# Patient Record
Sex: MALE | Race: WHITE | NOT HISPANIC OR LATINO | Employment: FULL TIME | URBAN - METROPOLITAN AREA
[De-identification: names, ages, dates, MRNs, and addresses within clinical notes are randomized per-mention and may not be internally consistent; named-entity substitution may affect disease eponyms.]

---

## 2017-05-23 ENCOUNTER — OFFICE VISIT (OUTPATIENT)
Dept: URGENT CARE | Facility: RETAIL CLINIC | Age: 42
End: 2017-05-23
Payer: COMMERCIAL

## 2017-05-23 VITALS
SYSTOLIC BLOOD PRESSURE: 128 MMHG | HEART RATE: 84 BPM | TEMPERATURE: 98 F | OXYGEN SATURATION: 99 % | DIASTOLIC BLOOD PRESSURE: 82 MMHG

## 2017-05-23 DIAGNOSIS — J06.9 VIRAL URI WITH COUGH: Primary | ICD-10-CM

## 2017-05-23 PROCEDURE — 99213 OFFICE O/P EST LOW 20 MIN: CPT | Performed by: PHYSICIAN ASSISTANT

## 2017-05-23 RX ORDER — CODEINE PHOSPHATE AND GUAIFENESIN 10; 100 MG/5ML; MG/5ML
1-2 SOLUTION ORAL EVERY 4 HOURS PRN
Qty: 120 ML | Refills: 0 | Status: SHIPPED | OUTPATIENT
Start: 2017-05-23 | End: 2017-06-28

## 2017-05-23 RX ORDER — BENZONATATE 100 MG/1
CAPSULE ORAL
Qty: 40 CAPSULE | Refills: 0 | Status: SHIPPED | OUTPATIENT
Start: 2017-05-23 | End: 2017-06-28

## 2017-05-23 NOTE — NURSING NOTE
"Chief Complaint   Patient presents with     Cough     x 2 weeks, lack of energy x 8-10 days, no fevers       Initial /82 (BP Location: Left arm)  Pulse 84  Temp 98  F (36.7  C) (Temporal)  SpO2 99% Estimated body mass index is 25.2 kg/(m^2) as calculated from the following:    Height as of 11/11/16: 5' 11.26\" (1.81 m).    Weight as of 11/11/16: 182 lb (82.6 kg).  Medication Reconciliation: complete    "

## 2017-05-23 NOTE — PATIENT INSTRUCTIONS
Robitussin with codeine as needed for cough before sleep, up to every 4-6 hours. DO NOT take before driving a vehicle.  Tessalon Perles- Take 1-2 capsules by mouth 3 times daily as needed for cough. On hold at [facility].  No indication for antibiotics discussed.   Rest! Your body needs more rest to heal.  Drink plenty of fluids (warm fluids like tea or soup are soothing and reduce cough)  Sit in the bathroom with a hot shower running and breathe in the steam.  Honey may soothe your sore throat and help manage your cough- may take straight or in warm water with lemon juice.  Avoid smoke from cigarettes, fireplace, bonfire etc.  Take Tylenol or an NSAID such as ibuprofen or naproxen as needed for pain.  Delsym (dextromethorphan) is a 12 hour over the counter cough medication   Mucinex or Robitussin (guiafenesin) thin mucus and may help it to loosen more quickly  Good handwashing is the best way to prevent spread of germs  Present to emergency room if you develop trouble breathing, swallowing or cough-up blood.  Follow up with your primary care provider if symptoms worsen or fail to improve as expected.

## 2017-05-23 NOTE — NURSING NOTE
"Chief Complaint   Patient presents with     Cough     x 2 weeks, lack of energy x 8-10 days, no fevers       Initial /82 (BP Location: Left arm)  Pulse 84  Temp 98  F (36.7  C) (Temporal) Estimated body mass index is 25.2 kg/(m^2) as calculated from the following:    Height as of 11/11/16: 5' 11.26\" (1.81 m).    Weight as of 11/11/16: 182 lb (82.6 kg).  Medication Reconciliation: complete    "

## 2017-05-23 NOTE — MR AVS SNAPSHOT
After Visit Summary   5/23/2017    Fredy Keys    MRN: [id number]           Patient Information     Date Of Birth          1975        Visit Information        Provider Department      5/23/2017 4:00 PM Carol Macias PA-C [facility]        Today's Diagnoses     Viral URI with cough    -  1      Care Instructions    Robitussin with codeine as needed for cough before sleep, up to every 4-6 hours. DO NOT take before driving a vehicle.  Tessalon Perles- Take 1-2 capsules by mouth 3 times daily as needed for cough. On hold at [facility].  No indication for antibiotics discussed.   Rest! Your body needs more rest to heal.  Drink plenty of fluids (warm fluids like tea or soup are soothing and reduce cough)  Sit in the bathroom with a hot shower running and breathe in the steam.  Honey may soothe your sore throat and help manage your cough- may take straight or in warm water with lemon juice.  Avoid smoke from cigarettes, fireplace, bonfire etc.  Take Tylenol or an NSAID such as ibuprofen or naproxen as needed for pain.  Delsym (dextromethorphan) is a 12 hour over the counter cough medication   Mucinex or Robitussin (guiafenesin) thin mucus and may help it to loosen more quickly  Good handwashing is the best way to prevent spread of germs  Present to emergency room if you develop trouble breathing, swallowing or cough-up blood.  Follow up with your primary care provider if symptoms worsen or fail to improve as expected.        Follow-ups after your visit        Who to contact     You can reach your care team any time of the day by calling [phone].  Notification of test results:  If you have an abnormal lab result, we will notify you by phone as soon as possible.         Additional Information About Your Visit        MyChart Information     [employer] lets you send messages to your doctor, view your test results, renew your prescriptions, schedule appointments and  "more. To sign up, go to [email or web address] . Click on \"Log in\" on the left side of the screen, which will take you to the Welcome page. Then click on \"Sign up Now\" on the right side of the page.     You will be asked to enter the access code listed below, as well as some personal information. Please follow the directions to create your username and password.     Your access code is: [email or web address]  Expires: 2017  4:15 PM     Your access code will  in 90 days. If you need help or a new code, please call your [place] clinic or [phone].        Care EveryWhere ID     This is your Care EveryWhere ID. This could be used by other organizations to access your [place] medical records  [id number]        Your Vitals Were     Pulse Temperature Pulse Oximetry             84 98  F (36.7  C) (Temporal) 99%          Blood Pressure from Last 3 Encounters:   17 128/82   16 120/68   10/26/16 120/78    Weight from Last 3 Encounters:   16 182 lb (82.6 kg)   10/26/16 186 lb (84.4 kg)   07/28/15 177 lb (80.3 kg)              Today, you had the following     No orders found for display         Today's Medication Changes          These changes are accurate as of: 17  4:15 PM.  If you have any questions, ask your nurse or doctor.               Start taking these medicines.        Dose/Directions    benzonatate 100 MG capsule   Commonly known as:  TESSALON   Used for:  Viral URI with cough        Take 1-2 capsules by mouth 3 times daily as needed for cough.   Quantity:  40 capsule   Refills:  0       guaiFENesin-codeine 100-10 MG/5ML Soln solution   Commonly known as:  ROBITUSSIN AC   Used for:  Viral URI with cough        Dose:  1-2 tsp.   Take 5-10 mLs by mouth every 4 hours as needed for cough   Quantity:  120 mL   Refills:  0            Where to get your medicines      [facility] [place], [place] -  [facility] " [id number]     Phone:  [phone]     benzonatate 100 MG capsule         Some of these will need a paper prescription and others can be bought over the counter.  Ask your nurse if you have questions.     Bring a paper prescription for each of these medications     guaiFENesin-codeine 100-10 MG/5ML Soln solution                Primary Care Provider Office Phone # Fax #    Shree Cee [phone]       [facility] [place] [address] [place] [address] [address] AGUSTIN SANTOS [id number]        Thank you!     Thank you for choosing [facility]  for your care. Our goal is always to provide you with excellent care. Hearing back from our patients is one way we can continue to improve our services. Please take a few minutes to complete the written survey that you may receive in the mail after your visit with us. Thank you!             Your Updated Medication List - Protect others around you: Learn how to safely use, store and throw away your medicines at www.disposemymeds.org.          This list is accurate as of: 5/23/17  4:15 PM.  Always use your most recent med list.                   Brand Name Dispense Instructions for use    benzonatate 100 MG capsule    TESSALON    40 capsule    Take 1-2 capsules by mouth 3 times daily as needed for cough.       fluticasone 50 MCG/ACT spray    FLONASE    3 Package    Spray 1-2 sprays into both nostrils daily       guaiFENesin-codeine 100-10 MG/5ML Soln solution    ROBITUSSIN AC    120 mL    Take 5-10 mLs by mouth every 4 hours as needed for cough       olopatadine HCl 0.2 % Soln    PATADAY    1 Bottle    Apply 1 drop to eye daily       triamcinolone 0.1 % cream    KENALOG    15 g    Apply sparingly to affected area three times daily       ZYRTEC-D 5-120 MG per 12 hr tablet   Generic drug:  cetirizine-psuedoePHEDrine     3 MONTHS    ONE TABLET TWICE DAILY

## 2017-05-23 NOTE — PROGRESS NOTES
Chief Complaint   Patient presents with     Cough     x 2 weeks, lack of energy x 8-10 days, no fevers     SUBJECTIVE:  Fredy Keys is a 41 year old male who presents to the clinic today with a chief complaint of cough for 8-10 days.  His cough is described as productive of yellow sputum.  The patient's symptoms are moderate and not changing over the course of time.  Associated symptoms include fatigue.  The patient's symptoms are exacerbated by no particular triggers.  Patient has been using OTC cough suppressants to improve symptoms.  Predisposing factors include: seasonal allergies.    Past Medical History:   Diagnosis Date     ALLERGIC RHINITIS NOS 8/24/2006     Current Outpatient Prescriptions   Medication Sig Dispense Refill     guaiFENesin-codeine (ROBITUSSIN AC) 100-10 MG/5ML SOLN solution Take 5-10 mLs by mouth every 4 hours as needed for cough 120 mL 0     benzonatate (TESSALON) 100 MG capsule Take 1-2 capsules by mouth 3 times daily as needed for cough. 40 capsule 0     olopatadine HCl (PATADAY) 0.2 % SOLN Apply 1 drop to eye daily 1 Bottle 11     fluticasone (FLONASE) 50 MCG/ACT nasal spray Spray 1-2 sprays into both nostrils daily 3 Package 3     ZYRTEC-D 5-120 MG OR TB12 ONE TABLET TWICE DAILY 3 MONTHS 1 YEAR     triamcinolone (KENALOG) 0.1 % cream Apply sparingly to affected area three times daily (Patient not taking: Reported on 5/23/2017) 15 g 4     Social History   Substance Use Topics     Smoking status: Never Smoker     Smokeless tobacco: Current User     Types: Chew      Comment: no smoking in the home.  1 tin/week in summer, 1 every other month in winter     Alcohol use 0.0 oz/week     0 Standard drinks or equivalent per week      Comment: 4-5  drinks weekly     Allergies   Allergen Reactions     Zithromax [Azithromycin Dihydrate] Hives     Seasonal Allergies      ROS  Review of systems negative except as stated above.    OBJECTIVE:  /82 (BP Location: Left arm)  Pulse 84  Temp 98  F  (36.7  C) (Temporal)  SpO2 99%  GENERAL APPEARANCE: healthy, alert and in no distress  HEENT: PERRL, conjunctiva clear. Bilateral ear canals and TM's normal. Nose with moderately erythematous and edematous turbinates with clear discharge. Posterior pharynx nonerythematous and without tonsillar hypertrophy or exudate.  NECK: supple, nontender, no lymphadenopathy  RESP: lungs clear to auscultation - no rales, rhonchi or wheezes. Breathing is comfortable, not labored and without use of accessory muscles.  CV: regular rates and rhythm, normal S1 S2, no murmur noted    ASSESSMENT:    ICD-10-CM    1. Viral URI with cough J06.9 guaiFENesin-codeine (ROBITUSSIN AC) 100-10 MG/5ML SOLN solution    B97.89 benzonatate (TESSALON) 100 MG capsule     PLAN:   Patient Instructions   Robitussin with codeine as needed for cough before sleep, up to every 4-6 hours. DO NOT take before driving a vehicle.  Tessalon Perles- Take 1-2 capsules by mouth 3 times daily as needed for cough. On hold at [facility].  No indication for antibiotics discussed.   Rest! Your body needs more rest to heal.  Drink plenty of fluids (warm fluids like tea or soup are soothing and reduce cough)  Sit in the bathroom with a hot shower running and breathe in the steam.  Honey may soothe your sore throat and help manage your cough- may take straight or in warm water with lemon juice.  Avoid smoke from cigarettes, fireplace, bonfire etc.  Take Tylenol or an NSAID such as ibuprofen or naproxen as needed for pain.  Delsym (dextromethorphan) is a 12 hour over the counter cough medication   Mucinex or Robitussin (guiafenesin) thin mucus and may help it to loosen more quickly  Good handwashing is the best way to prevent spread of germs  Present to emergency room if you develop trouble breathing, swallowing or cough-up blood.  Follow up with your primary care provider if symptoms worsen or fail to improve as expected.    Follow up with primary care provider with  any problems, questions or concerns or if symptoms worsen or fail to improve. Patient agreed to plan and verbalized understanding.    Lottie Macias PA-C  [facility] [place]

## 2017-06-28 ENCOUNTER — OFFICE VISIT (OUTPATIENT)
Dept: FAMILY MEDICINE | Facility: CLINIC | Age: 42
End: 2017-06-28
Payer: COMMERCIAL

## 2017-06-28 VITALS
HEART RATE: 80 BPM | TEMPERATURE: 98.4 F | BODY MASS INDEX: 25.48 KG/M2 | WEIGHT: 184 LBS | DIASTOLIC BLOOD PRESSURE: 82 MMHG | OXYGEN SATURATION: 97 % | SYSTOLIC BLOOD PRESSURE: 124 MMHG | RESPIRATION RATE: 12 BRPM

## 2017-06-28 PROCEDURE — 99214 OFFICE O/P EST MOD 30 MIN: CPT | Performed by: FAMILY MEDICINE

## 2017-06-28 RX ORDER — DIAZEPAM 10 MG
10 TABLET ORAL EVERY 6 HOURS PRN
Qty: 2 TABLET | Refills: 0 | Status: SHIPPED | OUTPATIENT
Start: 2017-06-28 | End: 2018-05-11

## 2017-06-28 RX ORDER — IBUPROFEN 800 MG/1
800 TABLET, FILM COATED ORAL EVERY 8 HOURS PRN
Qty: 90 TABLET | Refills: 1 | Status: SHIPPED | OUTPATIENT
Start: 2017-06-28 | End: 2020-09-18

## 2017-06-28 ASSESSMENT — PAIN SCALES - GENERAL: PAINLEVEL: NO PAIN (0)

## 2017-06-28 NOTE — NURSING NOTE
"Chief Complaint   Patient presents with     Consult     Vasectomy       Initial /82  Pulse 80  Temp 98.4  F (36.9  C) (Tympanic)  Resp 12  Wt 184 lb (83.5 kg)  SpO2 97%  BMI 25.48 kg/m2 Estimated body mass index is 25.48 kg/(m^2) as calculated from the following:    Height as of 11/11/16: 5' 11.26\" (1.81 m).    Weight as of this encounter: 184 lb (83.5 kg).  Medication Reconciliation: complete    "

## 2017-06-28 NOTE — MR AVS SNAPSHOT
"              After Visit Summary   6/28/2017    Fredy Keys    MRN: [id number]           Patient Information     Date Of Birth          1975        Visit Information        Provider Department      6/28/2017 2:00 PM Gabe Heredia MD [facility]        Today's Diagnoses     Consult for Vasectomy    -  1       Follow-ups after your visit        Your next 10 appointments already scheduled     Jun 29, 2017  1:20 PM CDT   Vasectomy with Gabe Heredia MD, NL PROC ROOM ONE [facility] ([facility])    [address] [place] [id number] [phone]              Who to contact     If you have questions or need follow up information about today's clinic visit or your schedule please contact [facility] directly at [phone].  Normal or non-critical lab and imaging results will be communicated to you by MyChart, letter or phone within 4 business days after the clinic has received the results. If you do not hear from us within 7 days, please contact the clinic through MyChart or phone. If you have a critical or abnormal lab result, we will notify you by phone as soon as possible.  Submit refill requests through [employer] or call your pharmacy and they will forward the refill request to us. Please allow 3 business days for your refill to be completed.          Additional Information About Your Visit        MyChart Information     [employer]EME lets you send messages to your doctor, view your test results, renew your prescriptions, schedule appointments and more. To sign up, go to [email or web address] . Click on \"Log in\" on the left side of the screen, which will take you to the Welcome page. Then click on \"Sign up Now\" on the right side of the page.     You will be asked to enter the access code listed below, as well as some personal information. Please follow the directions to create your username and password.     Your access " code is: [email or web address]  Expires: 2017  4:15 PM     Your access code will  in 90 days. If you need help or a new code, please call your [place] clinic or [phone].        Care EveryWhere ID     This is your Care EveryWhere ID. This could be used by other organizations to access your [place] medical records  [id number]        Your Vitals Were     Pulse Temperature Respirations Pulse Oximetry BMI (Body Mass Index)       80 98.4  F (36.9  C) (Tympanic) 12 97% 25.48 kg/m2        Blood Pressure from Last 3 Encounters:   17 124/82   17 128/82   16 120/68    Weight from Last 3 Encounters:   17 184 lb (83.5 kg)   16 182 lb (82.6 kg)   10/26/16 186 lb (84.4 kg)              Today, you had the following     No orders found for display         Today's Medication Changes          These changes are accurate as of: 17  4:38 PM.  If you have any questions, ask your nurse or doctor.               Start taking these medicines.        Dose/Directions    diazepam 10 MG tablet   Commonly known as:  VALIUM   Used for:  Reason for consultation   Started by:  Gabe Heredia MD        Dose:  10 mg   Take 1 tablet (10 mg) by mouth every 6 hours as needed for anxiety or sleep Take 30-60 minutes before procedure.  Do not operate a vehicle after taking this medication.   Quantity:  2 tablet   Refills:  0       ibuprofen 800 MG tablet   Commonly known as:  ADVIL/MOTRIN   Used for:  Reason for consultation   Started by:  Gabe Heredia MD        Dose:  800 mg   Take 1 tablet (800 mg) by mouth every 8 hours as needed for moderate pain   Quantity:  90 tablet   Refills:  1            Where to get your medicines      These medications were sent to [facility] [place] [address] [address]     Phone:  [phone]     ibuprofen 800 MG tablet         Some of these will need a paper prescription and others can be bought over the  counter.  Ask your nurse if you have questions.     Bring a paper prescription for each of these medications     diazepam 10 MG tablet                Primary Care Provider Office Phone # Fax #    Shree Cee [phone]       [facility] [place] [address] [place] [address]        Equal Access to Services     KAYLI CLEMONS : Hadii aad ku hadasho Soomaali, waaxda luqadaha, qaybta kaalmada adeegyada, waxay idiin hayaan adeeg khgenevievesh la'bin ah. So [facility] [phone].    ATENCIÓN: Si habla español, tiene a arreaga disposición servicios gratuitos de asistencia lingüística. [facility] [phone].    We comply with applicable federal civil rights laws and [place] laws. We do not discriminate on the basis of race, color, national origin, age, disability sex, sexual orientation or gender identity.            Thank you!     Thank you for choosing [facility]  for your care. Our goal is always to provide you with excellent care. Hearing back from our patients is one way we can continue to improve our services. Please take a few minutes to complete the written survey that you may receive in the mail after your visit with us. Thank you!             Your Updated Medication List - Protect others around you: Learn how to safely use, store and throw away your medicines at www.disposemymeds.org.          This list is accurate as of: 6/28/17  4:38 PM.  Always use your most recent med list.                   Brand Name Dispense Instructions for use Diagnosis    diazepam 10 MG tablet    VALIUM    2 tablet    Take 1 tablet (10 mg) by mouth every 6 hours as needed for anxiety or sleep Take 30-60 minutes before procedure.  Do not operate a vehicle after taking this medication.    Reason for consultation       fluticasone 50 MCG/ACT spray    FLONASE    3 Package    Spray 1-2 sprays into both nostrils daily    Allergic rhinitis, cause unspecified       ibuprofen 800 MG tablet    ADVIL/MOTRIN     90 tablet    Take 1 tablet (800 mg) by mouth every 8 hours as needed for moderate pain    Reason for consultation       olopatadine HCl 0.2 % Soln    PATADAY    1 Bottle    Apply 1 drop to eye daily    Other chronic allergic conjunctivitis       ZYRTEC-D 5-120 MG per 12 hr tablet   Generic drug:  cetirizine-psuedoePHEDrine     3 MONTHS    ONE TABLET TWICE DAILY    Allergic rhinitis, cause unspecified

## 2017-06-28 NOTE — PROGRESS NOTES
SUBJECTIVE:  This 41 year old male is in for a vasectomy consultation.  He and his partner have decided they don't want to have any more children. They have 0 male and 3 female children; ages 9,12, and 14.  They have decided that he will have the sterilization procedure instead of her.  Patient was given information to read prior to the consultation.      We talked about the risks and benefits of the vasectomy; risks being that of potential for bleeding, infection, postoperative discomfort immediately which can last for a number of weeks afterwards, also the development of spermatoceles or sperm granulomas, epididymal cysts and the possibility of failure of the procedure producing failed attempt at sterilization. Number quoted was 1 out of 1000 risk of failure.      Also mentioned to the patient that there is some literature out there that suggests men who have vasectomies are at increased risk for prostate cancer; however, this literature is inconclusive and controversial.  It is recommended that men who have vasectomies should have annual prostate exams through the rectum yearly after age 40 and also may benefit from a screening prostatic specific antigen test after age 40.  We also discussed signs and symptoms of prostatic enlargement or prostatic problems.     I went through the procedure with the patient, describing the no scapel technique.  Using special instruments a midline incision/rent in the skin of the scrotum allowing each vasdeferens to be brought up through this incision dissecting it free from adventitial tissue, transecting it and then cauterizing into the lumen of each end. The proximal end is then buried away from the other so they are not in the same tissue plane. The patient and his partner had no questions when it came to the procedure itself.  I did show him pictures and diagrams of the procedure.  We also discussed the possibility of a spermatocele or sperm granuloma or epididymitis.       Again, the patient had no further questions for me.    OBJECTIVE:  On exam, this is a well-developed, well-nourished white male.    /82  Pulse 80  Temp 98.4  F (36.9  C) (Tympanic)  Resp 12  Wt 184 lb (83.5 kg)  SpO2 97%  BMI 25.48 kg/m2    Exam reveals a normal circumcised penis, no lesions.  Testes are descended bilaterally.  Exam was limited to the genitalia.  Both vas deferens were easily identified.  No other abnormalities were noted.      ASSESSMENT:  Undesired fertility in an adult male, requesting permanent sterilization.    PLAN:  He will schedule a vasectomy at his convenience for either the last appointment of the morning or afternoon on a Thursday or Friday.  He is aware that he will need to take the entire weekend off and have essentially bedrest for two days with ice packs 20-30 minutes out of every hour and ibuprofen for discomfort.  I gave him a prescription for ibuprofen 800 mg. to take every six to eight hours with food for two weeks.  He will take one tablet half an hour before the procedure along with Valium 10 mg and repeat that if he needs a second dose once he gets to the clinic.  If he has any further questions, he will contact me prior to the procedure if he has a waiting period or we will review them prior to the start of the procedure today.  He also is aware that he will need to bring a specimen after a minimum of 10 wks to make sure that he is sterile.     25 minutes were spent with this patient during this consultation with over 50% spent in counseling regarding his vasectomy.     Electronically signed by:  Gabe Heredia M.D.  6/28/2017

## 2017-06-29 ENCOUNTER — OFFICE VISIT (OUTPATIENT)
Dept: FAMILY MEDICINE | Facility: CLINIC | Age: 42
End: 2017-06-29
Payer: COMMERCIAL

## 2017-06-29 VITALS
HEART RATE: 82 BPM | OXYGEN SATURATION: 98 % | SYSTOLIC BLOOD PRESSURE: 124 MMHG | WEIGHT: 182 LBS | TEMPERATURE: 97 F | BODY MASS INDEX: 25.2 KG/M2 | RESPIRATION RATE: 18 BRPM | DIASTOLIC BLOOD PRESSURE: 88 MMHG

## 2017-06-29 DIAGNOSIS — Z30.2 ENCOUNTER FOR STERILIZATION: Primary | ICD-10-CM

## 2017-06-29 PROCEDURE — 55250 REMOVAL OF SPERM DUCT(S): CPT | Performed by: FAMILY MEDICINE

## 2017-06-29 ASSESSMENT — PAIN SCALES - GENERAL: PAINLEVEL: NO PAIN (0)

## 2017-06-29 NOTE — PROGRESS NOTES
The patient comes in today for a vasectomy. The patient had previously been in and we discussed the other options for birth control, the risks and benefits of the procedure. Details of those risks and benefits have been noted in the consent form which has been signed. This includes the potential for bleeding, infection, bruising, potential for sperm granuloma/epididymitis, and failure. Data presented suggests, but has not been supported by further research for an increased risk of prostate carcinoma.     The patient was placed in a supine position on the procedure table. Shave prep was done by the patient at home.   He was then sterilely prepped and draped in the usual fashion. The left vas was first identified and brought up to the midline raphae where a small wheal of Lidocaine with epinephrine was placed in the skin overlying the vas and further anesthesia was injected in to the vas sheath. A small 0.5 cm incision was made with a #15 blade in the skin of the scrotum. The no-scalpel dissecting instrument was then used to dissect the vas deferense free of adventitial tissue. The no-scalpel vas clamp was then used to grasp the vas. When a segment of vas was clearly freed from the adventitia and vascular bundle, it was transected with a scissors and each end was cauterized approximately 1cm down into the vasdeferens itself to seal it. No section of the vas was removed. The distal end was buried within the vas sheath with a purse string suture of 4-0 chromic. No bleeding was noted at the completion of this step and at this time the vasdeferens was returned to the scrotal sac.     Attention was then turned to the opposite side and proceeded in similar fashion. The vas was brought up through the same opening. The scrotal skin incision was not closed at completion of the procedure.     ASSESSMENT:   1) Male sterilization via vasectomy.     PLAN:   Patient tolerated the procedure quite well. He will use Ibuprofen 800 mg.  p.o. t.i.d. with food x 5-7 days. Ice to the scrotum 30 minutes out of every hour for the next 48 hrs. No heavy lifting for three days. The patient will bring in a sample of semen for analysis after a minimum of 12 wks, and will contact me if any problems should arise.    Electronically signed by:  Gabe Heredia M.D.  6/29/2017

## 2017-06-29 NOTE — MR AVS SNAPSHOT
"              After Visit Summary   6/29/2017    Fredy Keys    MRN: [id number]           Patient Information     Date Of Birth          1975        Visit Information        Provider Department      6/29/2017 1:20 PM Gabe Heredia MD; [place] PROC ROOM ONE [facility]        Today's Diagnoses     Encounter for sterilization    -  1       Follow-ups after your visit        Future tests that were ordered for you today     Open Future Orders        Priority Expected Expires Ordered    Semen Post Vasectomy Qual ([place], [place], [place]) Routine  9/29/2017 6/29/2017            Who to contact     If you have questions or need follow up information about today's clinic visit or your schedule please contact [facility] directly at [phone].  Normal or non-critical lab and imaging results will be communicated to you by MyChart, letter or phone within 4 business days after the clinic has received the results. If you do not hear from us within 7 days, please contact the clinic through [employer] or phone. If you have a critical or abnormal lab result, we will notify you by phone as soon as possible.  Submit refill requests through [employer] or call your pharmacy and they will forward the refill request to us. Please allow 3 business days for your refill to be completed.          Additional Information About Your Visit        MyChart Information     [employer] lets you send messages to your doctor, view your test results, renew your prescriptions, schedule appointments and more. To sign up, go to [email or web address] . Click on \"Log in\" on the left side of the screen, which will take you to the Welcome page. Then click on \"Sign up Now\" on the right side of the page.     You will be asked to enter the access code listed below, as well as some personal information. Please follow the directions to create your username and password.     Your access code is: [email or web address]  Expires: 8/21/2017  4:15 " PM     Your access code will  in 90 days. If you need help or a new code, please call your [facility] clinic or [phone].        Care EveryWhere ID     This is your Care EveryWhere ID. This could be used by other organizations to access your [facility] medical records  [id number]        Your Vitals Were     Pulse Temperature Respirations Pulse Oximetry BMI (Body Mass Index)       82 97  F (36.1  C) (Tympanic) 18 98% 25.2 kg/m2        Blood Pressure from Last 3 Encounters:   17 124/88   17 124/82   17 128/82    Weight from Last 3 Encounters:   17 182 lb (82.6 kg)   17 184 lb (83.5 kg)   16 182 lb (82.6 kg)              We Performed the Following     REMOVAL OF SPERM DUCT(S) [03158]        Primary Care Provider Office Phone # Fax #    Shree Antoine Cee [phone]       [address] [place] [address]        Equal Access to Services     [facility]: Hadii aad ku hadasho Soomaali, waaxda luqadaha, qaybta kaalmada adeegyada, alicia ortiz . So [facility] [phone].    ATENCIÓN: Si habla español, tiene a arreaga disposición servicios gratuitos de asistencia lingüística. [facility] [phone].    We comply with applicable federal civil rights laws and [place] laws. We do not discriminate on the basis of race, color, national origin, age, disability sex, sexual orientation or gender identity.            Thank you!     Thank you for choosing [facility]  for your care. Our goal is always to provide you with excellent care. Hearing back from our patients is one way we can continue to improve our services. Please take a few minutes to complete the written survey that you may receive in the mail after your visit with us. Thank you!             Your Updated Medication List - Protect others around you: Learn how to safely use, store and throw away your medicines at www.disposemymeds.org.           This list is accurate as of: 6/29/17 11:59 PM.  Always use your most recent med list.                   Brand Name Dispense Instructions for use Diagnosis    diazepam 10 MG tablet    VALIUM    2 tablet    Take 1 tablet (10 mg) by mouth every 6 hours as needed for anxiety or sleep Take 30-60 minutes before procedure.  Do not operate a vehicle after taking this medication.    Reason for consultation       fluticasone 50 MCG/ACT spray    FLONASE    3 Package    Spray 1-2 sprays into both nostrils daily    Allergic rhinitis, cause unspecified       ibuprofen 800 MG tablet    ADVIL/MOTRIN    90 tablet    Take 1 tablet (800 mg) by mouth every 8 hours as needed for moderate pain    Reason for consultation       olopatadine HCl 0.2 % Soln    PATADAY    1 Bottle    Apply 1 drop to eye daily    Other chronic allergic conjunctivitis       ZYRTEC-D 5-120 MG per 12 hr tablet   Generic drug:  cetirizine-psuedoePHEDrine     3 MONTHS    ONE TABLET TWICE DAILY    Allergic rhinitis, cause unspecified

## 2017-06-29 NOTE — NURSING NOTE
"Chief Complaint   Patient presents with     Vasectomy       Initial /88  Pulse 82  Temp 97  F (36.1  C) (Tympanic)  Resp 18  Wt 182 lb (82.6 kg)  SpO2 98%  BMI 25.2 kg/m2 Estimated body mass index is 25.2 kg/(m^2) as calculated from the following:    Height as of 11/11/16: 5' 11.26\" (1.81 m).    Weight as of this encounter: 182 lb (82.6 kg).  BP completed using cuff size: christa Browne MA      "

## 2017-10-18 ENCOUNTER — TELEPHONE (OUTPATIENT)
Dept: FAMILY MEDICINE | Facility: CLINIC | Age: 42
End: 2017-10-18

## 2017-10-18 NOTE — TELEPHONE ENCOUNTER
Remind patient that it is time for him to bring his post vasectomy sample in to the lab.                Electronically signed by:       Gabe Heredia M.D.       10/12/2017      Pt was notified  MP/MA

## 2017-10-19 DIAGNOSIS — Z30.2 ENCOUNTER FOR STERILIZATION: ICD-10-CM

## 2017-10-19 LAB — SEMEN ANALYSIS P VAS PNL: ABNORMAL

## 2017-10-19 PROCEDURE — 89321 SEMEN ANAL SPERM DETECTION: CPT | Performed by: FAMILY MEDICINE

## 2017-10-19 NOTE — PROGRESS NOTES
Semen analysis showed some sperm so will have him drop off another sample in 3-4 wks.    Electronically signed by:  Gabe Heredia M.D.  10/19/2017

## 2017-12-13 ENCOUNTER — TELEPHONE (OUTPATIENT)
Dept: FAMILY MEDICINE | Facility: CLINIC | Age: 42
End: 2017-12-13

## 2017-12-13 NOTE — TELEPHONE ENCOUNTER
Called pt and left detailed msg to follow RN's advise from previous msg. Should be seen through ER or urgent today to have symptoms addressed.

## 2017-12-13 NOTE — TELEPHONE ENCOUNTER
"Fredy Keys is a 42 year old male who calls with cardiac/BP concern.  Fredy says his heart just feels \"off\". He wouldn't describe it as pain, but just doesn't feel right.  Fredy is asking if he can just have a quick appt in [facility] today to get it checked out quick.  RN advised there were no appt yet today and if really concerned he should go the the ED.   Fredy states that he is not going to the ER.     NURSING ASSESSMENT:  Description:  \"I have an uncomfortable feeling in my my chest.\"  Onset/duration:  A few days . He took his BP at a pharmacy and it indicated that he is pre hypertension.  His reported BP was 139/98 pulse of 73.  Precip. factors:  None. Denies SOB, denies actual PAIN.  \"It does not radiate into my arm or anything.\"  Allergies:   Allergies   Allergen Reactions     Zithromax [Azithromycin Dihydrate] Hives     Seasonal Allergies      NURSING PLAN: Nursing advice to patient Go to ED for evaluation and make an appt with primary for a work up.    RECOMMENDED DISPOSITION:  To ED/ for evaluation, another person to drive - Fredy states he may try the Urgent care in [place].  Will comply with recommendation: Yes  If further questions/concerns or if symptoms do not improve, worsen or new symptoms develop, call your PCP or [facility] as soon as possible.      Guideline used:  Telephone Triage Protocols for Nurses, Fifth Edition, Sosa Estrada RN      "

## 2017-12-13 NOTE — TELEPHONE ENCOUNTER
"Reason for Call:  Same Day Appointment, Requested Provider:  Shree Cee M.D.    PCP: Shree Cee    Reason for visit: work in-\"uncomfotable feeling in chest\"     Duration of symptoms:     Have you been treated for this in the past? No    Additional comments:     Can we leave a detailed message on this number? YES    Phone number patient can be reached at: Cell number on file:    Telephone Information:   Mobile [phone]       Best Time: any    Call taken on 12/13/2017 at 3:37 PM by Trinh Clancy    "

## 2017-12-16 ENCOUNTER — APPOINTMENT (OUTPATIENT)
Dept: GENERAL RADIOLOGY | Facility: CLINIC | Age: 42
End: 2017-12-16
Attending: PHYSICIAN ASSISTANT
Payer: COMMERCIAL

## 2017-12-16 ENCOUNTER — HOSPITAL ENCOUNTER (EMERGENCY)
Facility: CLINIC | Age: 42
Discharge: HOME | End: 2017-12-16
Attending: PHYSICIAN ASSISTANT | Admitting: PHYSICIAN ASSISTANT
Payer: COMMERCIAL

## 2017-12-16 ENCOUNTER — APPOINTMENT (OUTPATIENT)
Dept: CT IMAGING | Facility: CLINIC | Age: 42
End: 2017-12-16
Attending: PHYSICIAN ASSISTANT
Payer: COMMERCIAL

## 2017-12-16 VITALS
BODY MASS INDEX: 25.2 KG/M2 | OXYGEN SATURATION: 98 % | RESPIRATION RATE: 19 BRPM | SYSTOLIC BLOOD PRESSURE: 117 MMHG | HEART RATE: 73 BPM | TEMPERATURE: 97 F | HEIGHT: 71 IN | WEIGHT: 180 LBS | DIASTOLIC BLOOD PRESSURE: 84 MMHG

## 2017-12-16 DIAGNOSIS — R07.89 ATYPICAL CHEST PAIN: ICD-10-CM

## 2017-12-16 DIAGNOSIS — K85.00 IDIOPATHIC ACUTE PANCREATITIS WITHOUT INFECTION OR NECROSIS: ICD-10-CM

## 2017-12-16 LAB
ALBUMIN SERPL-MCNC: 3.7 G/DL (ref 3.4–5)
ALP SERPL-CCNC: 61 U/L (ref 40–150)
ALT SERPL W P-5'-P-CCNC: 27 U/L (ref 0–70)
AMYLASE SERPL-CCNC: 169 U/L (ref 30–110)
ANION GAP SERPL CALCULATED.3IONS-SCNC: 7 MMOL/L (ref 3–14)
AST SERPL W P-5'-P-CCNC: 19 U/L (ref 0–45)
BASOPHILS # BLD AUTO: 0 10E9/L (ref 0–0.2)
BASOPHILS NFR BLD AUTO: 0.4 %
BILIRUB SERPL-MCNC: 0.5 MG/DL (ref 0.2–1.3)
BUN SERPL-MCNC: 27 MG/DL (ref 7–30)
CALCIUM SERPL-MCNC: 8.4 MG/DL (ref 8.5–10.1)
CHLORIDE SERPL-SCNC: 108 MMOL/L (ref 94–109)
CO2 SERPL-SCNC: 28 MMOL/L (ref 20–32)
CREAT SERPL-MCNC: 1.31 MG/DL (ref 0.66–1.25)
D DIMER PPP FEU-MCNC: 0.3 UG/ML FEU (ref 0–0.5)
DIFFERENTIAL METHOD BLD: NORMAL
EOSINOPHIL # BLD AUTO: 0.1 10E9/L (ref 0–0.7)
EOSINOPHIL NFR BLD AUTO: 0.8 %
ERYTHROCYTE [DISTWIDTH] IN BLOOD BY AUTOMATED COUNT: 13.2 % (ref 10–15)
GFR SERPL CREATININE-BSD FRML MDRD: 60 ML/MIN/1.7M2
GLUCOSE SERPL-MCNC: 123 MG/DL (ref 70–99)
HCT VFR BLD AUTO: 47 % (ref 40–53)
HGB BLD-MCNC: 16 G/DL (ref 13.3–17.7)
IMM GRANULOCYTES # BLD: 0 10E9/L (ref 0–0.4)
IMM GRANULOCYTES NFR BLD: 0.3 %
LIPASE SERPL-CCNC: 1713 U/L (ref 73–393)
LYMPHOCYTES # BLD AUTO: 2.5 10E9/L (ref 0.8–5.3)
LYMPHOCYTES NFR BLD AUTO: 23.9 %
MCH RBC QN AUTO: 31.4 PG (ref 26.5–33)
MCHC RBC AUTO-ENTMCNC: 34 G/DL (ref 31.5–36.5)
MCV RBC AUTO: 92 FL (ref 78–100)
MONOCYTES # BLD AUTO: 0.7 10E9/L (ref 0–1.3)
MONOCYTES NFR BLD AUTO: 7 %
NEUTROPHILS # BLD AUTO: 7.1 10E9/L (ref 1.6–8.3)
NEUTROPHILS NFR BLD AUTO: 67.6 %
PLATELET # BLD AUTO: 277 10E9/L (ref 150–450)
POTASSIUM SERPL-SCNC: 4.1 MMOL/L (ref 3.4–5.3)
PROT SERPL-MCNC: 6.6 G/DL (ref 6.8–8.8)
RBC # BLD AUTO: 5.09 10E12/L (ref 4.4–5.9)
SODIUM SERPL-SCNC: 143 MMOL/L (ref 133–144)
TRIGL SERPL-MCNC: 137 MG/DL
TROPONIN I SERPL-MCNC: <0.015 UG/L (ref 0–0.04)
WBC # BLD AUTO: 10.4 10E9/L (ref 4–11)

## 2017-12-16 PROCEDURE — 93010 ELECTROCARDIOGRAM REPORT: CPT | Mod: Z6 | Performed by: PHYSICIAN ASSISTANT

## 2017-12-16 PROCEDURE — 99285 EMERGENCY DEPT VISIT HI MDM: CPT | Mod: 25 | Performed by: PHYSICIAN ASSISTANT

## 2017-12-16 PROCEDURE — 84478 ASSAY OF TRIGLYCERIDES: CPT | Performed by: PHYSICIAN ASSISTANT

## 2017-12-16 PROCEDURE — 85379 FIBRIN DEGRADATION QUANT: CPT | Performed by: PHYSICIAN ASSISTANT

## 2017-12-16 PROCEDURE — 71020 XR CHEST 2 VW: CPT | Mod: TC

## 2017-12-16 PROCEDURE — 82150 ASSAY OF AMYLASE: CPT | Performed by: PHYSICIAN ASSISTANT

## 2017-12-16 PROCEDURE — 84484 ASSAY OF TROPONIN QUANT: CPT | Performed by: PHYSICIAN ASSISTANT

## 2017-12-16 PROCEDURE — 25000132 ZZH RX MED GY IP 250 OP 250 PS 637: Performed by: PHYSICIAN ASSISTANT

## 2017-12-16 PROCEDURE — 25000125 ZZHC RX 250: Performed by: PHYSICIAN ASSISTANT

## 2017-12-16 PROCEDURE — 80053 COMPREHEN METABOLIC PANEL: CPT | Performed by: PHYSICIAN ASSISTANT

## 2017-12-16 PROCEDURE — 83690 ASSAY OF LIPASE: CPT | Performed by: PHYSICIAN ASSISTANT

## 2017-12-16 PROCEDURE — 25000128 H RX IP 250 OP 636: Performed by: PHYSICIAN ASSISTANT

## 2017-12-16 PROCEDURE — 93005 ELECTROCARDIOGRAM TRACING: CPT | Performed by: PHYSICIAN ASSISTANT

## 2017-12-16 PROCEDURE — 85025 COMPLETE CBC W/AUTO DIFF WBC: CPT | Performed by: PHYSICIAN ASSISTANT

## 2017-12-16 PROCEDURE — 74177 CT ABD & PELVIS W/CONTRAST: CPT

## 2017-12-16 RX ORDER — IOPAMIDOL 755 MG/ML
100 INJECTION, SOLUTION INTRAVASCULAR ONCE
Status: COMPLETED | OUTPATIENT
Start: 2017-12-16 | End: 2017-12-16

## 2017-12-16 RX ADMIN — LIDOCAINE HYDROCHLORIDE 30 ML: 20 SOLUTION ORAL; TOPICAL at 17:34

## 2017-12-16 RX ADMIN — IOPAMIDOL 89 ML: 755 INJECTION, SOLUTION INTRAVENOUS at 16:54

## 2017-12-16 RX ADMIN — SODIUM CHLORIDE 60 ML: 9 INJECTION, SOLUTION INTRAVENOUS at 16:54

## 2017-12-16 ASSESSMENT — ENCOUNTER SYMPTOMS
COUGH: 0
SHORTNESS OF BREATH: 0
FEVER: 0
CHILLS: 0

## 2017-12-16 NOTE — ED AVS SNAPSHOT
[facility] Emergency Department    [address] [place] [address]    Phone:  [phone]    Fax:  [phone]                                       Fredy Keys   MRN: [id number]    Department:  [facility] Emergency Department   Date of Visit:  12/16/2017           After Visit Summary Signature Page     I have received my discharge instructions, and my questions have been answered. I have discussed any challenges I see with this plan with the nurse or doctor.    ..........................................................................................................................................  Patient/Patient Representative Signature      ..........................................................................................................................................  Patient Representative Print Name and Relationship to Patient    ..................................................               ................................................  Date                                            Time    ..........................................................................................................................................  Reviewed by Signature/Title    ...................................................              ..............................................  Date                                                            Time

## 2017-12-16 NOTE — ED AVS SNAPSHOT
[facility] Emergency Department    [address] [place] [address]    Phone:  [phone]    Fax:  [phone]                                       Fredy Keys   MRN: [id number]    Department:  [facility] Emergency Department   Date of Visit:  12/16/2017           Patient Information     Date Of Birth          1975        Your diagnoses for this visit were:     Atypical chest pain     Idiopathic acute pancreatitis without infection or necrosis        You were seen by Humza Rey PA-C.      Follow-up Information     [facility] In 2 days.    Why:  For follow up on your condition    Contact information:    [address] [place] [address]  [phone]          Discharge Instructions         Discharge Instructions for Acute Pancreatitis  You have been diagnosed with acute pancreatitis. Your pancreas is inflamed or swollen. The pancreas is an organ that makes digestive juices and hormones.   Immediate home care    Find someone to drive you to appointments. Acute pancreatitis is a serious condition, and you should never drive if you are experiencing symptoms.    Stop drinking if your illness was caused by alcohol.    Ask your healthcare provider about alcohol abuse programs and support groups such as Alcoholics Anonymous.    Ask your provider about prescription medicines that can help you stop drinking.    Tell your provider about the alcohol withdrawal symptoms you have when you stop drinking. This is very important. You may need close medical supervision and special medicines when you stop drinking. This will depend on your alcohol withdrawal history.     Take your medicines exactly as directed. Don t skip doses.    Eat a low-fat diet. Ask your provider for menus and other diet information.    Learn to take your own pulse. Keep a record of your results. Ask your provider which readings mean that you need medical  attention.  Ongoing care    Tell your provider about any medicines you are taking. Some medicines can cause this condition.    Before starting any new medicine, ask your provider if it will harm your pancreas. This includes any new over-the-counter medicines, vitamins, or herbal supplements.      Tell your provider if you lose weight without dieting.    Be aware of symptoms that may mean your pancreatitis has come back. These symptoms include belly pain, nausea and vomiting, and fever.    Keep all follow-up appointments with your provider. Problems can often show up later.  Follow-up  Follow up with your healthcare provider, or as advised.  When to call your provider  Call your healthcare provider right away if you have any of the following:    Fever of 100.4 F (38.0 C) or higher, or as advised by your provider    Severe pain from your upper belly to your back    Nausea and vomiting    Feely dizzy or lightheaded    Yellowing of your skin or eyes (jaundice)    Bruises on your belly or back    Belly swelling and tenderness    Rapid pulse    Shallow, fast breathing   Date Last Reviewed: 8/1/2016 2000-2017 [employer]. [address] [place] [address]. All rights reserved. This information is not intended as a substitute for professional medical care. Always follow your healthcare professional's instructions.          Discharge References/Attachments     DIET, DISCHARGE INSTRUCTIONS FOR EATING A CLEAR LIQUID  (ENGLISH)      Future Appointments        Provider Department Dept Phone Center    12/18/2017 12:00 PM Merrill Estrada MD [facility] [phone] [facility]      24 Hour Appointment Hotline       To make an appointment at any [facility], call [phone] ([phone]). If you don't have a family doctor or clinic, we will help you find one. [facility] are conveniently located to serve the needs of you and your family.             Review of your  medicines      Our records show that you are taking the medicines listed below. If these are incorrect, please call your family doctor or clinic.        Dose / Directions Last dose taken    diazepam 10 MG tablet   Commonly known as:  VALIUM   Dose:  10 mg   Quantity:  2 tablet        Take 1 tablet (10 mg) by mouth every 6 hours as needed for anxiety or sleep Take 30-60 minutes before procedure.  Do not operate a vehicle after taking this medication.   Refills:  0        fluticasone 50 MCG/ACT spray   Commonly known as:  FLONASE   Dose:  1-2 spray   Quantity:  3 Package        Spray 1-2 sprays into both nostrils daily   Refills:  3        ibuprofen 800 MG tablet   Commonly known as:  ADVIL/MOTRIN   Dose:  800 mg   Quantity:  90 tablet        Take 1 tablet (800 mg) by mouth every 8 hours as needed for moderate pain   Refills:  1        olopatadine HCl 0.2 % Soln   Commonly known as:  PATADAY   Dose:  1 drop   Quantity:  1 Bottle        Apply 1 drop to eye daily   Refills:  11        ZYRTEC-D 5-120 MG per 12 hr tablet   Quantity:  3 MONTHS   Generic drug:  cetirizine-psuedoePHEDrine        ONE TABLET TWICE DAILY   Refills:  1 YEAR                Procedures and tests performed during your visit     Amylase    CBC with platelets differential    CT Abdomen Pelvis w Contrast    Comprehensive metabolic panel    D dimer quantitative    EKG 12-lead, tracing only    Give 20 ounces of water 15 minutes before CT of abdomen    Lipase    Peripheral IV catheter    Triglycerides    Troponin I    XR Chest 2 Views      Orders Needing Specimen Collection     None      Pending Results     Date and Time Order Name Status Description    12/16/2017 1615 CT Abdomen Pelvis w Contrast Preliminary     12/16/2017 1519 Amylase In process             Pending Culture Results     No orders found from 12/14/2017 to 12/17/2017.            Pending Results Instructions     If you had any lab results that were not finalized at the time of your  "Discharge, you can call the ED Lab Result RN at [phone]. You will be contacted by this team for any positive Lab results or changes in treatment. The nurses are available 7 days a week from 10A to 6:30P.  You can leave a message 24 hours per day and they will return your call.        Thank you for choosing [facility]       Thank you for choosing [facility] for your care. Our goal is always to provide you with excellent care. Hearing back from our patients is one way we can continue to improve our services. Please take a few minutes to complete the written survey that you may receive in the mail after you visit with us. Thank you!        [employer] Information     [employer] lets you send messages to your doctor, view your test results, renew your prescriptions, schedule appointments and more. To sign up, go to [email or web address] . Click on \"Log in\" on the left side of the screen, which will take you to the Welcome page. Then click on \"Sign up Now\" on the right side of the page.     You will be asked to enter the access code listed below, as well as some personal information. Please follow the directions to create your username and password.     Your access code is: [email or web address]  Expires: 3/16/2018  6:07 PM     Your access code will  in 90 days. If you need help or a new code, please call your [facility] clinic or [phone].        Care EveryWhere ID     This is your Care EveryWhere ID. This could be used by other organizations to access your [facility] medical records  [id number]        Equal Access to Services     DOROTHY CLEMONS : Hadii aad ku hadasho Soselinali, waaxda luqadaha, qaybta kaalmada ademelissayada, alicia galan. So [facility] [phone].    ATENCIÓN: Si habla español, tiene a arreaga disposición servicios gratuitos de asistencia lingüística. Llame al [phone].    We comply with applicable federal civil rights laws and [place] laws. We do not discriminate on the basis of race, " color, national origin, age, disability, sex, sexual orientation, or gender identity.            After Visit Summary       This is your record. Keep this with you and show to your community pharmacist(s) and doctor(s) at your next visit.

## 2017-12-16 NOTE — ED NOTES
Patient updated on plan of care that he is going to CT. He is aware his lipase is elevated. Patient drank his 20 ounces of water.

## 2017-12-16 NOTE — ED PROVIDER NOTES
"  History     Chief Complaint   Patient presents with     Chest Pain     The history is provided by the patient.     Fredy Keys is a 42 year old male who presents to the emergency department with concerns of chest pain. Patient reports that about 1 week ago he started experiencing chest pain that did not concern him. He describes the pain as a \"cramping\" pressure, this pain is mainly in the left side. Today it has been the most consistent. Patient states that it is more discomfort than pain, but rates it 2-3/10.  No radiation of the discomfort to the jaw, left arm, or through to the back. He explains that it does not bother him while exercising, he exercises 3-4 days a week.  Denies any increased pain with activity or exercise. He states that he actually does not even notice the discomfort while he is exercising. The chest discomfort has not caused him to stop any activities. Denies trauma to the chest. Patient notes that he used a pre-work out called Jym he first took this 2-3 weeks ago, the last time was about 1 week ago. No fever or chills. No cough or shortness of breath.  Denies any calf swelling or calf discomfort. No history of venous thromboembolism. Denies a past cardiac history. No family history of cardiac disease. Reports that he had normal cholesterol levels in the past.  Nonsmoker and nondiabetic. He has not taken anything for his discomfort to this point.  No recent fevers or chills.    Problem List:    Patient Active Problem List    Diagnosis Date Noted     Other chronic allergic conjunctivitis 06/17/2011     Priority: Medium     CARDIOVASCULAR SCREENING; LDL GOAL LESS THAN 160 10/31/2010     Priority: Medium     Allergic rhinitis 08/24/2006     Priority: Medium     Problem list name updated by automated process. Provider to review          Past Medical History:    Past Medical History:   Diagnosis Date     ALLERGIC RHINITIS NOS 8/24/2006       Past Surgical History:    Past Surgical History: " "  Procedure Laterality Date     ENT SURGERY         Family History:    Family History   Problem Relation Age of Onset     Genitourinary Problems Father      kidney disease?     Circulatory Father      Factor 5      Thyroid Disease Mother      thyroid cancer     OSTEOPOROSIS Mother      DIABETES Paternal Grandmother        Social History:  Marital Status:   [2]  Social History   Substance Use Topics     Smoking status: Never Smoker     Smokeless tobacco: Current User     Types: Chew      Comment: no smoking in the home.  1 tin/week in summer, 1 every other month in winter     Alcohol use 0.0 oz/week     0 Standard drinks or equivalent per week      Comment: 4-5  drinks weekly        Medications:      diazepam (VALIUM) 10 MG tablet   ibuprofen (ADVIL/MOTRIN) 800 MG tablet   olopatadine HCl (PATADAY) 0.2 % SOLN   fluticasone (FLONASE) 50 MCG/ACT nasal spray   ZYRTEC-D 5-120 MG OR TB12         Review of Systems   Constitutional: Negative for chills and fever.   Respiratory: Negative for cough and shortness of breath.    Cardiovascular: Positive for chest pain.   All other systems reviewed and are negative.      Physical Exam   BP: (!) 143/93  Pulse: 84  Heart Rate: 79  Temp: 97  F (36.1  C)  Resp: 12  Height: 180.3 cm (5' 11\")  Weight: 81.6 kg (180 lb)  SpO2: 98 %      Physical Exam   Constitutional: He is oriented to person, place, and time. He appears well-developed and well-nourished. No distress.   HENT:   Head: Normocephalic and atraumatic.   Right Ear: External ear normal.   Left Ear: External ear normal.   Nose: Nose normal.   Mouth/Throat: Oropharynx is clear and moist. No oropharyngeal exudate.   Eyes: Conjunctivae and EOM are normal. Pupils are equal, round, and reactive to light. Right eye exhibits no discharge. Left eye exhibits no discharge. No scleral icterus.   Neck: Normal range of motion. Neck supple. No thyromegaly present.   Cardiovascular: Normal rate, regular rhythm, normal heart sounds and " intact distal pulses.    No murmur heard.  Pulmonary/Chest: Effort normal and breath sounds normal. No respiratory distress. He has no wheezes. He has no rales. He exhibits no tenderness.   Abdominal: Soft. Bowel sounds are normal. He exhibits no distension and no mass. There is no tenderness. There is no rebound and no guarding.   Negative Martinez's sign.   Musculoskeletal: Normal range of motion. He exhibits no edema, tenderness or deformity.   No calf edema. Negative Homans sign.   Lymphadenopathy:     He has no cervical adenopathy.   Neurological: He is alert and oriented to person, place, and time. No cranial nerve deficit.   Skin: Skin is warm and dry. No rash noted. He is not diaphoretic. No erythema. No pallor.   Psychiatric: He has a normal mood and affect. His behavior is normal.       ED Course     ED Course     I recommended the patient take 325 mg aspirin while we initiate his workup. He declined. He stated that he was concerned about the cost of the aspirin. We discussed the reasoning for the aspirin in treatment for possible cardiac disease. After explaining this, the patient still declined taking the aspirin. The RN was present during this conversation.      Procedures               EKG Interpretation:      Interpreted by Humza Rey  Time reviewed: 1500  Symptoms at time of EKG: left sided chest pressure.   Rhythm: normal sinus   Rate: normal  Axis: normal  Ectopy: none  Conduction: normal  ST Segments/ T Waves: No ST-T wave changes  Q Waves: none  Comparison to prior: No old EKG available    Clinical Impression: normal EKG            Critical Care time:  none     Results for orders placed or performed during the hospital encounter of 12/16/17   XR Chest 2 Views    Narrative    CHEST TWO VIEW 12/16/2017 4:01 PM     HISTORY: Left-sided chest pain.      COMPARISON: 7/11/2014      Impression    IMPRESSION:  No acute pulmonary disease.    CARMENCITA VILLANUEVA MD   CT Abdomen Pelvis w Contrast    Narrative     CT ABDOMEN AND PELVIS WITH CONTRAST   12/16/2017 5:02 PM     HISTORY: Pancreatitis.     TECHNIQUE: 89 mL Isovue-370. Radiation dose for this scan was reduced  using automated exposure control, adjustment of the mA and/or kV  according to patient size, or iterative reconstruction technique.    COMPARISON: July 04    FINDINGS:  No evidence of diverticulitis or small bowel obstruction.  Unremarkable appendix. Small lesion at the inferior aspect of the  spleen which has a density measurement consistent with a cyst. The  pancreas appears unremarkable. Nothing else acute is seen in the upper  abdominal organs.       Impression    IMPRESSION:  No acute inflammatory process identified.    CBC with platelets differential   Result Value Ref Range    WBC 10.4 4.0 - 11.0 10e9/L    RBC Count 5.09 4.4 - 5.9 10e12/L    Hemoglobin 16.0 13.3 - 17.7 g/dL    Hematocrit 47.0 40.0 - 53.0 %    MCV 92 78 - 100 fl    MCH 31.4 26.5 - 33.0 pg    MCHC 34.0 31.5 - 36.5 g/dL    RDW 13.2 10.0 - 15.0 %    Platelet Count 277 150 - 450 10e9/L    Diff Method Automated Method     % Neutrophils 67.6 %    % Lymphocytes 23.9 %    % Monocytes 7.0 %    % Eosinophils 0.8 %    % Basophils 0.4 %    % Immature Granulocytes 0.3 %    Absolute Neutrophil 7.1 1.6 - 8.3 10e9/L    Absolute Lymphocytes 2.5 0.8 - 5.3 10e9/L    Absolute Monocytes 0.7 0.0 - 1.3 10e9/L    Absolute Eosinophils 0.1 0.0 - 0.7 10e9/L    Absolute Basophils 0.0 0.0 - 0.2 10e9/L    Abs Immature Granulocytes 0.0 0 - 0.4 10e9/L   D dimer quantitative   Result Value Ref Range    D Dimer 0.3 0.0 - 0.50 ug/ml FEU   Comprehensive metabolic panel   Result Value Ref Range    Sodium 143 133 - 144 mmol/L    Potassium 4.1 3.4 - 5.3 mmol/L    Chloride 108 94 - 109 mmol/L    Carbon Dioxide 28 20 - 32 mmol/L    Anion Gap 7 3 - 14 mmol/L    Glucose 123 (H) 70 - 99 mg/dL    Urea Nitrogen 27 7 - 30 mg/dL    Creatinine 1.31 (H) 0.66 - 1.25 mg/dL    GFR Estimate 60 (L) >60 mL/min/1.7m2    GFR Estimate If  Black 72 >60 mL/min/1.7m2    Calcium 8.4 (L) 8.5 - 10.1 mg/dL    Bilirubin Total 0.5 0.2 - 1.3 mg/dL    Albumin 3.7 3.4 - 5.0 g/dL    Protein Total 6.6 (L) 6.8 - 8.8 g/dL    Alkaline Phosphatase 61 40 - 150 U/L    ALT 27 0 - 70 U/L    AST 19 0 - 45 U/L   Lipase   Result Value Ref Range    Lipase 1713 (H) 73 - 393 U/L   Troponin I   Result Value Ref Range    Troponin I ES <0.015 0.000 - 0.045 ug/L   Amylase   Result Value Ref Range    Amylase 169 (H) 30 - 110 U/L   Triglycerides   Result Value Ref Range    Triglycerides 137 <150 mg/dL                Results for orders placed or performed during the hospital encounter of 12/16/17 (from the past 24 hour(s))   CBC with platelets differential   Result Value Ref Range    WBC 10.4 4.0 - 11.0 10e9/L    RBC Count 5.09 4.4 - 5.9 10e12/L    Hemoglobin 16.0 13.3 - 17.7 g/dL    Hematocrit 47.0 40.0 - 53.0 %    MCV 92 78 - 100 fl    MCH 31.4 26.5 - 33.0 pg    MCHC 34.0 31.5 - 36.5 g/dL    RDW 13.2 10.0 - 15.0 %    Platelet Count 277 150 - 450 10e9/L    Diff Method Automated Method     % Neutrophils 67.6 %    % Lymphocytes 23.9 %    % Monocytes 7.0 %    % Eosinophils 0.8 %    % Basophils 0.4 %    % Immature Granulocytes 0.3 %    Absolute Neutrophil 7.1 1.6 - 8.3 10e9/L    Absolute Lymphocytes 2.5 0.8 - 5.3 10e9/L    Absolute Monocytes 0.7 0.0 - 1.3 10e9/L    Absolute Eosinophils 0.1 0.0 - 0.7 10e9/L    Absolute Basophils 0.0 0.0 - 0.2 10e9/L    Abs Immature Granulocytes 0.0 0 - 0.4 10e9/L   D dimer quantitative   Result Value Ref Range    D Dimer 0.3 0.0 - 0.50 ug/ml FEU   Comprehensive metabolic panel   Result Value Ref Range    Sodium 143 133 - 144 mmol/L    Potassium 4.1 3.4 - 5.3 mmol/L    Chloride 108 94 - 109 mmol/L    Carbon Dioxide 28 20 - 32 mmol/L    Anion Gap 7 3 - 14 mmol/L    Glucose 123 (H) 70 - 99 mg/dL    Urea Nitrogen 27 7 - 30 mg/dL    Creatinine 1.31 (H) 0.66 - 1.25 mg/dL    GFR Estimate 60 (L) >60 mL/min/1.7m2    GFR Estimate If Black 72 >60 mL/min/1.7m2     Calcium 8.4 (L) 8.5 - 10.1 mg/dL    Bilirubin Total 0.5 0.2 - 1.3 mg/dL    Albumin 3.7 3.4 - 5.0 g/dL    Protein Total 6.6 (L) 6.8 - 8.8 g/dL    Alkaline Phosphatase 61 40 - 150 U/L    ALT 27 0 - 70 U/L    AST 19 0 - 45 U/L   Lipase   Result Value Ref Range    Lipase 1713 (H) 73 - 393 U/L   Troponin I   Result Value Ref Range    Troponin I ES <0.015 0.000 - 0.045 ug/L   Amylase   Result Value Ref Range    Amylase 169 (H) 30 - 110 U/L   Triglycerides   Result Value Ref Range    Triglycerides 137 <150 mg/dL   XR Chest 2 Views    Narrative    CHEST TWO VIEW 12/16/2017 4:01 PM     HISTORY: Left-sided chest pain.      COMPARISON: 7/11/2014      Impression    IMPRESSION:  No acute pulmonary disease.    CARMENCITA VILLANUEVA MD   CT Abdomen Pelvis w Contrast    Narrative    CT ABDOMEN AND PELVIS WITH CONTRAST   12/16/2017 5:02 PM     HISTORY: Pancreatitis.     TECHNIQUE: 89 mL Isovue-370. Radiation dose for this scan was reduced  using automated exposure control, adjustment of the mA and/or kV  according to patient size, or iterative reconstruction technique.    COMPARISON: July 04    FINDINGS:  No evidence of diverticulitis or small bowel obstruction.  Unremarkable appendix. Small lesion at the inferior aspect of the  spleen which has a density measurement consistent with a cyst. The  pancreas appears unremarkable. Nothing else acute is seen in the upper  abdominal organs.       Impression    IMPRESSION:  No acute inflammatory process identified.        Medications   iopamidol (ISOVUE-370) solution 100 mL (89 mLs Intravenous Given 12/16/17 1654)   sodium chloride (PF) 0.9% PF flush 3 mL (3 mLs Intracatheter Given 12/16/17 1654)   new 500 ml saline bag (60 mLs Intravenous Given 12/16/17 1654)   lidocaine (viscous) (XYLOCAINE) 2 % 15 mL, alum & mag hydroxide-simethicone (MYLANTA ES/MAALOX  ES) 15 mL GI Cocktail (30 mLs Oral Given 12/16/17 1734)       Assessments & Plan (with Medical Decision Making)     Atypical chest  pain  Idiopathic acute pancreatitis without infection or necrosis       42 year old male presents for evaluation of left anterior chest pressure sensation off and on for the past one week rated 2 on a scale of 10. He states that it does not feel like pain, rather a pressure. No increased symptoms with exertion. He exercises 3 times per week with lifting and aerobic activity. He does not notice his symptoms at that time. No cardiac history, and no family history of coronary artery disease.  Patient is not diabetic, he is a nonsmoker, and reports having normal lipid panel testing in the past. On exam blood pressure 143/93, pulse 84, temperature 97.0, and oxygen saturation of 98% on room air. She has a completely normal exam as noted above. EKG was done upon arrival and shows no acute abnormalities. Chest x-ray negative for acute infiltrate or mass lesion. Laboratory levels show an elevated lipase to 1713. Amylase elevated to 169. Triglyceride level was 137.  LFTs normal. Creatinine with a mildly elevated level of 1.31 and a GFR of 60. BUN/creatinine ratio was 20.7, and could suggest mild dehydration.   CBC with white blood cell count of 10,400 and hemoglobin normal at 16. D-dimer normal at 0.3. CT of the abdomen/pelvis was performed to confirm absence of pancreatic mass or pseudocyst. No acute inflammatory changes were noted of the pancreas. No gallstones present, although this is not the ideal study looking for cholelithiasis.  Patient expressed concerns regarding cost today, and therefore if needed a RUQ US could be performed as an outpatient.  No right upper quadrant discomfort on exam. Negative Martinez's sign. He does have a possible trigger for the pancreatitis and that he ingested alcoholic beverages last evening. He states he has a history of chronic abdominal discomfort, for which she has not been evaluated before. He certainly could have a form of chronic pancreatitis with a current acute flare. Patient did  not require any pain medicine here in the ED. He states that he has been eating and drinking normally without nausea or vomiting. I did not feel that the chest pain is related to a cardiac etiology. He has had discomfort for one week, and I would've expected EKG changes and troponin elevation by now if he had significant coronary artery disease. Certainly, outpatient cardiac stress testing could be performed if his symptoms persist. At this time, he does not have any exertional symptoms, and he does not have any risk factors. Given his tolerance for p.o. hydration/nourishment and the fact that he does not have significant pain levels, I think that we can trial outpatient management for his pancreatitis. He would rather do this as well. We discussed a clear liquid diet over the next 2 days. He was set up with outpatient follow-up to see his PCP in 2 days. Repeat labs will be performed at that time. Indications to return to the ED prior to then discussed with him in detail, which would include increased pain, fevers, chills, nausea, or vomiting.  The patient was in agreement with this plan and was suitable for discharge.        I have reviewed the nursing notes.    I have reviewed the findings, diagnosis, plan and need for follow up with the patient.       New Prescriptions    No medications on file       Final diagnoses:   Atypical chest pain   Idiopathic acute pancreatitis without infection or necrosis     This document serves as a record of services personally performed by Humza Rey PA-C. It was created on their behalf by Eden Hammond, a trained medical scribe. The creation of this record is based on the provider's personal observations and the statements of the patient. This document has been checked and approved by the attending provider.  Note: Chart documentation done in part with Dragon Voice Recognition software. Although reviewed after completion, some word and grammatical errors may  remain.  12/16/2017   Humza Rey PA-C   [facility] EMERGENCY DEPARTMENT     Humza Rey PA-C  12/16/17 1813

## 2017-12-17 NOTE — DISCHARGE INSTRUCTIONS
Discharge Instructions for Acute Pancreatitis  You have been diagnosed with acute pancreatitis. Your pancreas is inflamed or swollen. The pancreas is an organ that makes digestive juices and hormones.   Immediate home care    Find someone to drive you to appointments. Acute pancreatitis is a serious condition, and you should never drive if you are experiencing symptoms.    Stop drinking if your illness was caused by alcohol.    Ask your healthcare provider about alcohol abuse programs and support groups such as Alcoholics Anonymous.    Ask your provider about prescription medicines that can help you stop drinking.    Tell your provider about the alcohol withdrawal symptoms you have when you stop drinking. This is very important. You may need close medical supervision and special medicines when you stop drinking. This will depend on your alcohol withdrawal history.     Take your medicines exactly as directed. Don t skip doses.    Eat a low-fat diet. Ask your provider for menus and other diet information.    Learn to take your own pulse. Keep a record of your results. Ask your provider which readings mean that you need medical attention.  Ongoing care    Tell your provider about any medicines you are taking. Some medicines can cause this condition.    Before starting any new medicine, ask your provider if it will harm your pancreas. This includes any new over-the-counter medicines, vitamins, or herbal supplements.      Tell your provider if you lose weight without dieting.    Be aware of symptoms that may mean your pancreatitis has come back. These symptoms include belly pain, nausea and vomiting, and fever.    Keep all follow-up appointments with your provider. Problems can often show up later.  Follow-up  Follow up with your healthcare provider, or as advised.  When to call your provider  Call your healthcare provider right away if you have any of the following:    Fever of 100.4 F (38.0 C) or higher, or as  advised by your provider    Severe pain from your upper belly to your back    Nausea and vomiting    Feely dizzy or lightheaded    Yellowing of your skin or eyes (jaundice)    Bruises on your belly or back    Belly swelling and tenderness    Rapid pulse    Shallow, fast breathing   Date Last Reviewed: 8/1/2016 2000-2017 [employer]. [address] [place] [id number]. All rights reserved. This information is not intended as a substitute for professional medical care. Always follow your healthcare professional's instructions.

## 2017-12-18 ENCOUNTER — OFFICE VISIT (OUTPATIENT)
Dept: FAMILY MEDICINE | Facility: CLINIC | Age: 42
End: 2017-12-18
Payer: COMMERCIAL

## 2017-12-18 VITALS
BODY MASS INDEX: 25.98 KG/M2 | OXYGEN SATURATION: 98 % | DIASTOLIC BLOOD PRESSURE: 66 MMHG | TEMPERATURE: 97.9 F | SYSTOLIC BLOOD PRESSURE: 100 MMHG | WEIGHT: 185.6 LBS | HEIGHT: 71 IN | HEART RATE: 89 BPM

## 2017-12-18 DIAGNOSIS — K85.80 OTHER ACUTE PANCREATITIS, UNSPECIFIED COMPLICATION STATUS: Primary | ICD-10-CM

## 2017-12-18 DIAGNOSIS — R53.83 FATIGUE, UNSPECIFIED TYPE: ICD-10-CM

## 2017-12-18 LAB
ALBUMIN SERPL-MCNC: 4 G/DL (ref 3.4–5)
ALP SERPL-CCNC: 69 U/L (ref 40–150)
ALT SERPL W P-5'-P-CCNC: 24 U/L (ref 0–70)
AMYLASE SERPL-CCNC: 64 U/L (ref 30–110)
ANION GAP SERPL CALCULATED.3IONS-SCNC: 7 MMOL/L (ref 3–14)
AST SERPL W P-5'-P-CCNC: 16 U/L (ref 0–45)
BASOPHILS # BLD AUTO: 0.1 10E9/L (ref 0–0.2)
BASOPHILS NFR BLD AUTO: 0.5 %
BILIRUB SERPL-MCNC: 1 MG/DL (ref 0.2–1.3)
BUN SERPL-MCNC: 15 MG/DL (ref 7–30)
CALCIUM SERPL-MCNC: 8.8 MG/DL (ref 8.5–10.1)
CHLORIDE SERPL-SCNC: 102 MMOL/L (ref 94–109)
CHOLEST SERPL-MCNC: 135 MG/DL
CO2 SERPL-SCNC: 29 MMOL/L (ref 20–32)
CREAT SERPL-MCNC: 1.35 MG/DL (ref 0.66–1.25)
DIFFERENTIAL METHOD BLD: NORMAL
EOSINOPHIL # BLD AUTO: 0 10E9/L (ref 0–0.7)
EOSINOPHIL NFR BLD AUTO: 0.4 %
ERYTHROCYTE [DISTWIDTH] IN BLOOD BY AUTOMATED COUNT: 12.9 % (ref 10–15)
GFR SERPL CREATININE-BSD FRML MDRD: 58 ML/MIN/1.7M2
GLUCOSE SERPL-MCNC: 84 MG/DL (ref 70–99)
HCT VFR BLD AUTO: 49.2 % (ref 40–53)
HDLC SERPL-MCNC: 46 MG/DL
HGB BLD-MCNC: 16.8 G/DL (ref 13.3–17.7)
IMM GRANULOCYTES # BLD: 0 10E9/L (ref 0–0.4)
IMM GRANULOCYTES NFR BLD: 0.2 %
LDLC SERPL CALC-MCNC: 76 MG/DL
LIPASE SERPL-CCNC: 168 U/L (ref 73–393)
LYMPHOCYTES # BLD AUTO: 2.6 10E9/L (ref 0.8–5.3)
LYMPHOCYTES NFR BLD AUTO: 23.6 %
MCH RBC QN AUTO: 31.2 PG (ref 26.5–33)
MCHC RBC AUTO-ENTMCNC: 34.1 G/DL (ref 31.5–36.5)
MCV RBC AUTO: 91 FL (ref 78–100)
MONOCYTES # BLD AUTO: 0.9 10E9/L (ref 0–1.3)
MONOCYTES NFR BLD AUTO: 7.8 %
NEUTROPHILS # BLD AUTO: 7.4 10E9/L (ref 1.6–8.3)
NEUTROPHILS NFR BLD AUTO: 67.5 %
NONHDLC SERPL-MCNC: 89 MG/DL
PLATELET # BLD AUTO: 278 10E9/L (ref 150–450)
POTASSIUM SERPL-SCNC: 4.3 MMOL/L (ref 3.4–5.3)
PROT SERPL-MCNC: 7.1 G/DL (ref 6.8–8.8)
RBC # BLD AUTO: 5.39 10E12/L (ref 4.4–5.9)
SODIUM SERPL-SCNC: 138 MMOL/L (ref 133–144)
TRIGL SERPL-MCNC: 66 MG/DL
WBC # BLD AUTO: 11 10E9/L (ref 4–11)

## 2017-12-18 PROCEDURE — 85025 COMPLETE CBC W/AUTO DIFF WBC: CPT | Performed by: FAMILY MEDICINE

## 2017-12-18 PROCEDURE — 84270 ASSAY OF SEX HORMONE GLOBUL: CPT | Performed by: FAMILY MEDICINE

## 2017-12-18 PROCEDURE — 99214 OFFICE O/P EST MOD 30 MIN: CPT | Performed by: FAMILY MEDICINE

## 2017-12-18 PROCEDURE — 84403 ASSAY OF TOTAL TESTOSTERONE: CPT | Performed by: FAMILY MEDICINE

## 2017-12-18 PROCEDURE — 83690 ASSAY OF LIPASE: CPT | Performed by: FAMILY MEDICINE

## 2017-12-18 PROCEDURE — 80061 LIPID PANEL: CPT | Performed by: FAMILY MEDICINE

## 2017-12-18 PROCEDURE — 80053 COMPREHEN METABOLIC PANEL: CPT | Performed by: FAMILY MEDICINE

## 2017-12-18 PROCEDURE — 36415 COLL VENOUS BLD VENIPUNCTURE: CPT | Performed by: FAMILY MEDICINE

## 2017-12-18 PROCEDURE — 82150 ASSAY OF AMYLASE: CPT | Performed by: FAMILY MEDICINE

## 2017-12-18 NOTE — PROGRESS NOTES
SUBJECTIVE:   Fredy Keys is a 42 year old male who presents to clinic today for the following health issues:  Patient has been on a liquid diet since Saturday. Patient states that he is still having chest pain/pressure in his chest. Rates it as a 1-2 out of 10. Patient is on the liquid diet due to his pancreas. Lipase was 1713 at ED visit     ED/UC Followup:    Facility:  [facility]   Date of visit: 12/16/2017  Reason for visit: atypical chest pain. Idiopathic acute pancreatitis w/o infection/necrosis    Current Status: Much improved              Problem list and histories reviewed & adjusted, as indicated.  Additional history:         Reviewed and updated as needed this visit by clinical staff       Reviewed and updated as needed this visit by Provider        SUBJECTIVE:  Fredy  is a 42 year old male who presents for:  Follow-up of his pancreatitis. He is feeling much better. Denies any chest pain. Denies any abdominal pain. No nausea now. He's been only on clear liquids. Bowel movements been okay. No fevers.    Past Medical History:   Diagnosis Date     ALLERGIC RHINITIS NOS 8/24/2006     Past Surgical History:   Procedure Laterality Date     ENT SURGERY       Social History   Substance Use Topics     Smoking status: Never Smoker     Smokeless tobacco: Current User     Types: Chew      Comment: no smoking in the home.  1 tin/week in summer, 1 every other month in winter     Alcohol use 0.0 oz/week     0 Standard drinks or equivalent per week      Comment: 4-5  drinks weekly     Current Outpatient Prescriptions   Medication Sig Dispense Refill     fluticasone (FLONASE) 50 MCG/ACT nasal spray Spray 1-2 sprays into both nostrils daily 3 Package 3     ZYRTEC-D 5-120 MG OR TB12 ONE TABLET TWICE DAILY 3 MONTHS 1 YEAR     diazepam (VALIUM) 10 MG tablet Take 1 tablet (10 mg) by mouth every 6 hours as needed for anxiety or sleep Take 30-60 minutes before procedure.  Do not operate a vehicle after taking this medication.  "(Patient not taking: Reported on 12/18/2017) 2 tablet 0     ibuprofen (ADVIL/MOTRIN) 800 MG tablet Take 1 tablet (800 mg) by mouth every 8 hours as needed for moderate pain (Patient not taking: Reported on 12/18/2017) 90 tablet 1     olopatadine HCl (PATADAY) 0.2 % SOLN Apply 1 drop to eye daily (Patient not taking: Reported on 12/18/2017) 1 Bottle 11       REVIEW OF SYSTEMS:   5 point ROS negative except as noted above in HPI, including Gen., Resp, CV, GI &  system review.     OBJECTIVE:  Vitals: /66 (BP Location: Left arm, Patient Position: Chair, Cuff Size: Adult Large)  Pulse 89  Temp 97.9  F (36.6  C) (Temporal)  Ht 5' 11\" (1.803 m)  Wt 185 lb 9.6 oz (84.2 kg)  SpO2 98%  BMI 25.89 kg/m2  BMI= Body mass index is 25.89 kg/(m^2).  He is alert oriented appears in no distress. Mucous membranes moist. Lungs clear. Heart regular rhythm. Abdomen no tenderness. Skin clear. Extremities with no edema    ASSESSMENT:  #1 acute pancreatitis #2 atypical chest pain probably secondary to #1    PLAN:  We will do follow-up lab work today checking a lipase amylase and liver function and kidney function tests to make sure these are tending towards normal again. Physically he feels better. Considerable time was spent with him going over her cardiac risk factors of which she has pretty much gone and I really don't think further workup with a cardiac stress test would yield much. He is in agreement. He wanted a testosterone check done today as he has been a little bit fatigued. We will notify him with the lab tests and follow-up would be needed. He will start advancing his diet slowly now.        Merrill Estrada MD  [facility]              "

## 2017-12-18 NOTE — NURSING NOTE
"Chief Complaint   Patient presents with     RECHECK     ED visit        Initial /66 (BP Location: Left arm, Patient Position: Chair, Cuff Size: Adult Large)  Pulse 89  Temp 97.9  F (36.6  C) (Temporal)  Ht 5' 11\" (1.803 m)  Wt 185 lb 9.6 oz (84.2 kg)  SpO2 98%  BMI 25.89 kg/m2 Estimated body mass index is 25.89 kg/(m^2) as calculated from the following:    Height as of this encounter: 5' 11\" (1.803 m).    Weight as of this encounter: 185 lb 9.6 oz (84.2 kg).  Medication Reconciliation: complete    "

## 2017-12-18 NOTE — LETTER
[address]   [address]  Tel. [phone] / Fax [phone]    December 21, 2017        Fredy Keys  [address] [address] [id number]          Dear Fredy,    His serum lipase is now 168 which is normal down from 1713.  Amylase was normal at 64 down from 169.  These were the 2 pancreas test.  His kidney function test is still a little off.  Creatinine was 1.35    1.25 is the upper limit of normal.  Could be just from the illness and dehydration.  He should not take any nonsteroidal medications such as Aleve or ibuprofen.  Recheck this in a month.  Testosterone level was on the low end of normal at 249.  The range is 2 4-950 however the highest levels are early-morning and he did this at noon.  If he wants to pursue this would have to have him see urology, or do a repeat of this test in the early morning    Sincerely,        Merrill Estrada M.D.

## 2017-12-18 NOTE — MR AVS SNAPSHOT
"              After Visit Summary   2017    Fredy Keys    MRN: [id number]           Patient Information     Date Of Birth          1975        Visit Information        Provider Department      2017 12:00 PM Merrill Estrada MD [facility]        Today's Diagnoses     Other acute pancreatitis, unspecified complication status    -  1    Fatigue, unspecified type           Follow-ups after your visit        Who to contact     If you have questions or need follow up information about today's clinic visit or your schedule please contact [facility] directly at [phone].  Normal or non-critical lab and imaging results will be communicated to you by [employer], letter or phone within 4 business days after the clinic has received the results. If you do not hear from us within 7 days, please contact the clinic through evlyt or phone. If you have a critical or abnormal lab result, we will notify you by phone as soon as possible.  Submit refill requests through [employer] or call your pharmacy and they will forward the refill request to us. Please allow 3 business days for your refill to be completed.          Additional Information About Your Visit        MyChart Information     [employer] lets you send messages to your doctor, view your test results, renew your prescriptions, schedule appointments and more. To sign up, go to [email or web address] . Click on \"Log in\" on the left side of the screen, which will take you to the Welcome page. Then click on \"Sign up Now\" on the right side of the page.     You will be asked to enter the access code listed below, as well as some personal information. Please follow the directions to create your username and password.     Your access code is: [email or web address]  Expires: 3/16/2018  6:07 PM     Your access code will  in 90 days. If you need help or a new code, please call your [facility] or [phone].        Care EveryWhere ID  " "   This is your Care EveryWhere ID. This could be used by other organizations to access your [facility] medical records  [id number]        Your Vitals Were     Pulse Temperature Height Pulse Oximetry BMI (Body Mass Index)       89 97.9  F (36.6  C) (Temporal) 5' 11\" (1.803 m) 98% 25.89 kg/m2        Blood Pressure from Last 3 Encounters:   12/18/17 100/66   12/16/17 117/84   06/29/17 124/88    Weight from Last 3 Encounters:   12/18/17 185 lb 9.6 oz (84.2 kg)   12/16/17 180 lb (81.6 kg)   06/29/17 182 lb (82.6 kg)              We Performed the Following     **Testosterone Free and Total FUTURE anytime     Amylase     CBC with platelets differential     Comprehensive metabolic panel (BMP + Alb, Alk Phos, ALT, AST, Total. Bili, TP)     Lipase     Lipid Profile     TESTOSTERONE, FREE & TOTAL        Primary Care Provider Office Phone # Fax #    Shree Antoine Cee [phone] [address] [place] [address]        Equal Access to Services     [facility]: Hadii aad ku hadasho Soomaali, waaxda luqadaha, qaybta kaalmada adebuddy, alicia ortiz . So [facility] [phone].    ATENCIÓN: Si habla español, tiene a arreaga disposición servicios gratuitos de asistencia lingüística. [facility] [phone].    We comply with applicable federal civil rights laws and [place] laws. We do not discriminate on the basis of race, color, national origin, age, disability, sex, sexual orientation, or gender identity.            Thank you!     Thank you for choosing [facility]  for your care. Our goal is always to provide you with excellent care. Hearing back from our patients is one way we can continue to improve our services. Please take a few minutes to complete the written survey that you may receive in the mail after your visit with us. Thank you!             Your Updated Medication List - Protect others around you: Learn how to safely use, store and throw away your " medicines at www.disposemymeds.org.          This list is accurate as of: 12/18/17  2:22 PM.  Always use your most recent med list.                   Brand Name Dispense Instructions for use Diagnosis    diazepam 10 MG tablet    VALIUM    2 tablet    Take 1 tablet (10 mg) by mouth every 6 hours as needed for anxiety or sleep Take 30-60 minutes before procedure.  Do not operate a vehicle after taking this medication.    Reason for consultation       fluticasone 50 MCG/ACT spray    FLONASE    3 Package    Spray 1-2 sprays into both nostrils daily    Allergic rhinitis, cause unspecified       ibuprofen 800 MG tablet    ADVIL/MOTRIN    90 tablet    Take 1 tablet (800 mg) by mouth every 8 hours as needed for moderate pain    Reason for consultation       olopatadine HCl 0.2 % Soln    PATADAY    1 Bottle    Apply 1 drop to eye daily    Other chronic allergic conjunctivitis       ZYRTEC-D 5-120 MG per 12 hr tablet   Generic drug:  cetirizine-psuedoePHEDrine     3 MONTHS    ONE TABLET TWICE DAILY    Allergic rhinitis, cause unspecified

## 2017-12-20 LAB
SHBG SERPL-SCNC: 31 NMOL/L (ref 11–80)
TESTOST FREE SERPL-MCNC: 4.99 NG/DL (ref 4.7–24.4)
TESTOST SERPL-MCNC: 249 NG/DL (ref 240–950)

## 2017-12-21 ENCOUNTER — TELEPHONE (OUTPATIENT)
Dept: FAMILY MEDICINE | Facility: CLINIC | Age: 42
End: 2017-12-21

## 2017-12-21 DIAGNOSIS — Z13.6 CARDIOVASCULAR SCREENING; LDL GOAL LESS THAN 160: ICD-10-CM

## 2017-12-21 DIAGNOSIS — K85.80 OTHER ACUTE PANCREATITIS, UNSPECIFIED COMPLICATION STATUS: Primary | ICD-10-CM

## 2017-12-21 NOTE — PROGRESS NOTES
His serum lipase is now 168 which is normal down from 1713.  Amylase was normal at 64 down from 169.  These were the 2 pancreas test.  His kidney function test is still a little off.  Creatinine was 1.35    1.25 is the upper limit of normal.  Could be just from the illness and dehydration.  He should not take any nonsteroidal medications such as Aleve or ibuprofen.  Recheck this in a month.  Testosterone level was on the low end of normal at 249.  The range is 2 4-950 however the highest levels are early-morning and he did this at noon.  If he wants to pursue this would have to have him see urology, or do a repeat of this test in the early morning.

## 2017-12-21 NOTE — TELEPHONE ENCOUNTER
----- Message from Merrill Estrada MD sent at 12/21/2017  2:35 PM CST -----  His serum lipase is now 168 which is normal down from 1713.  Amylase was normal at 64 down from 169.  These were the 2 pancreas test.  His kidney function test is still a little off.  Creatinine was 1.35    1.25 is the upper limit of normal.  Could be just from the illness and dehydration.  He should not take any nonsteroidal medications such as Aleve or ibuprofen.  Recheck this in a month.  Testosterone level was on the low end of normal at 249.  The range is 2 4-950 however the highest levels are early-morning and he did this at noon.  If he wants to pursue this would have to have him see urology, or do a repeat of this test in the early morning.

## 2017-12-21 NOTE — TELEPHONE ENCOUNTER
Pt was advised of the test results and verbalized understanding. Pt would like a copy of the lab to be sent to his home address. Lab orders will be placed.

## 2017-12-21 NOTE — TELEPHONE ENCOUNTER
Reason for Call:  Request for results:    Name of test or procedure: Lab    Date of test of procedure: 12.18    Location of the test or procedure: [facility] to leave the result message on voice mail or with a family member? YES    Phone number Patient can be reached at:  Home number on file [phone] (home)    Additional comments: Requesting results from Dr Estrada's team    Call taken on 12/21/2017 at 2:23 PM by Romi Layton

## 2018-01-29 DIAGNOSIS — K85.80 OTHER ACUTE PANCREATITIS, UNSPECIFIED COMPLICATION STATUS: ICD-10-CM

## 2018-01-29 DIAGNOSIS — Z13.6 CARDIOVASCULAR SCREENING; LDL GOAL LESS THAN 160: ICD-10-CM

## 2018-01-29 LAB
ALBUMIN SERPL-MCNC: 4 G/DL (ref 3.4–5)
ALP SERPL-CCNC: 66 U/L (ref 40–150)
ALT SERPL W P-5'-P-CCNC: 26 U/L (ref 0–70)
ANION GAP SERPL CALCULATED.3IONS-SCNC: 6 MMOL/L (ref 3–14)
AST SERPL W P-5'-P-CCNC: 15 U/L (ref 0–45)
BILIRUB SERPL-MCNC: 0.5 MG/DL (ref 0.2–1.3)
BUN SERPL-MCNC: 16 MG/DL (ref 7–30)
CALCIUM SERPL-MCNC: 8.5 MG/DL (ref 8.5–10.1)
CHLORIDE SERPL-SCNC: 105 MMOL/L (ref 94–109)
CO2 SERPL-SCNC: 29 MMOL/L (ref 20–32)
CREAT SERPL-MCNC: 1.14 MG/DL (ref 0.66–1.25)
GFR SERPL CREATININE-BSD FRML MDRD: 70 ML/MIN/1.7M2
GLUCOSE SERPL-MCNC: 94 MG/DL (ref 70–99)
POTASSIUM SERPL-SCNC: 4 MMOL/L (ref 3.4–5.3)
PROT SERPL-MCNC: 7.3 G/DL (ref 6.8–8.8)
SODIUM SERPL-SCNC: 140 MMOL/L (ref 133–144)

## 2018-01-29 PROCEDURE — 84270 ASSAY OF SEX HORMONE GLOBUL: CPT | Performed by: FAMILY MEDICINE

## 2018-01-29 PROCEDURE — 84403 ASSAY OF TOTAL TESTOSTERONE: CPT | Performed by: FAMILY MEDICINE

## 2018-01-29 PROCEDURE — 36415 COLL VENOUS BLD VENIPUNCTURE: CPT | Performed by: FAMILY MEDICINE

## 2018-01-29 PROCEDURE — 80053 COMPREHEN METABOLIC PANEL: CPT | Performed by: FAMILY MEDICINE

## 2018-02-02 LAB
SHBG SERPL-SCNC: 28 NMOL/L (ref 11–80)
TESTOST FREE SERPL-MCNC: 13.71 NG/DL (ref 4.7–24.4)
TESTOST SERPL-MCNC: 582 NG/DL (ref 240–950)

## 2018-02-06 ENCOUNTER — TELEPHONE (OUTPATIENT)
Dept: FAMILY MEDICINE | Facility: CLINIC | Age: 43
End: 2018-02-06

## 2018-02-06 NOTE — TELEPHONE ENCOUNTER
Reason for Call:  Request for results:    Name of test or procedure: Lab    Date of test of procedure: 1.29  Location of the test or procedure: [facility] to leave the result message on voice mail or with a family member? YES    Phone number Patient can be reached at:  Home number on file [phone] (home)    Additional comments: Patient was advised Dr Estrada is not in clinic today    Call taken on 2/6/2018 at 2:50 PM by Romi Layton

## 2018-02-07 ENCOUNTER — TELEPHONE (OUTPATIENT)
Dept: FAMILY MEDICINE | Facility: CLINIC | Age: 43
End: 2018-02-07

## 2018-02-07 NOTE — PROGRESS NOTES
Labs show testosterone level 582 well within the normal range , one month ago was 249.  Chemistry panel including kidney function and liver function test is entirely normal now

## 2018-02-07 NOTE — TELEPHONE ENCOUNTER
----- Message from Merrill Estrada MD sent at 2/7/2018  9:54 AM CST -----  Labs show testosterone level 582 well within the normal range , one month ago was 249.  Chemistry panel including kidney function and liver function test is entirely normal now

## 2018-02-07 NOTE — TELEPHONE ENCOUNTER
See Dr. Estrada's result note: Everything was entirely normal. No further work up needed. Left VM for patient. Please notify of results.

## 2018-04-26 ENCOUNTER — TELEPHONE (OUTPATIENT)
Dept: FAMILY MEDICINE | Facility: CLINIC | Age: 43
End: 2018-04-26

## 2018-04-26 DIAGNOSIS — Z87.19 HISTORY OF PANCREATITIS: Primary | ICD-10-CM

## 2018-04-26 NOTE — TELEPHONE ENCOUNTER
Reason for Call: Request for an order or referral:    Order or referral being requested: lab to rule out pancreatitis    Date needed: as soon as possible    Has the patient been seen by the PCP for this problem? YES    Additional comments: patient has had this in the past, and feels he is getting the symptoms back, patient would like to come and get blood work done to see if it's coming back. Please advise    Phone number Patient can be reached at:  Cell number on file:    Telephone Information:   Mobile [phone]       Best Time:  any    Can we leave a detailed message on this number?  YES    Call taken on 4/26/2018 at 2:43 PM by Trinity Parrish

## 2018-04-26 NOTE — TELEPHONE ENCOUNTER
Please contact the patient to see if he is going to do his follow up semen analysis s/p vasectomy?  We can extend the order for him or if he refuses, can cancel it.                Electronically signed by:       Gabe Heredia M.D.       4/16/2018      Called patient and left message to call the clinic back.  MP/MA      Patient called right back and patient declines sample at this time.   FRANSICO/MA

## 2018-04-26 NOTE — TELEPHONE ENCOUNTER
Routing to Dr. Estrada's team as pt has not seen Dr. Cee in 2 years. Last saw Dr. Estrada 12/2017 for prancreatitis. Florence Murry, [facility]

## 2018-04-27 NOTE — TELEPHONE ENCOUNTER
Called patient and states he will call back to schedule lab.  Thank you,  Romi Layton  Patient Representative

## 2018-05-09 ENCOUNTER — TELEPHONE (OUTPATIENT)
Dept: URGENT CARE | Facility: RETAIL CLINIC | Age: 43
End: 2018-05-09

## 2018-05-09 DIAGNOSIS — H10.45 OTHER CHRONIC ALLERGIC CONJUNCTIVITIS OF BOTH EYES: ICD-10-CM

## 2018-05-09 DIAGNOSIS — Z87.19 HISTORY OF PANCREATITIS: ICD-10-CM

## 2018-05-09 LAB
ALBUMIN SERPL-MCNC: 4.1 G/DL (ref 3.4–5)
ALBUMIN UR-MCNC: NEGATIVE MG/DL
ALP SERPL-CCNC: 59 U/L (ref 40–150)
ALT SERPL W P-5'-P-CCNC: 29 U/L (ref 0–70)
ANION GAP SERPL CALCULATED.3IONS-SCNC: 6 MMOL/L (ref 3–14)
APPEARANCE UR: CLEAR
AST SERPL W P-5'-P-CCNC: 19 U/L (ref 0–45)
BACTERIA #/AREA URNS HPF: ABNORMAL /HPF
BILIRUB SERPL-MCNC: 0.3 MG/DL (ref 0.2–1.3)
BILIRUB UR QL STRIP: NEGATIVE
BUN SERPL-MCNC: 23 MG/DL (ref 7–30)
CALCIUM SERPL-MCNC: 8.6 MG/DL (ref 8.5–10.1)
CHLORIDE SERPL-SCNC: 105 MMOL/L (ref 94–109)
CO2 SERPL-SCNC: 29 MMOL/L (ref 20–32)
COLOR UR AUTO: YELLOW
CREAT SERPL-MCNC: 1.3 MG/DL (ref 0.66–1.25)
ERYTHROCYTE [DISTWIDTH] IN BLOOD BY AUTOMATED COUNT: 13.5 % (ref 10–15)
GFR SERPL CREATININE-BSD FRML MDRD: 60 ML/MIN/1.7M2
GLUCOSE SERPL-MCNC: 94 MG/DL (ref 70–99)
GLUCOSE UR STRIP-MCNC: NEGATIVE MG/DL
HCT VFR BLD AUTO: 47.7 % (ref 40–53)
HGB BLD-MCNC: 16.4 G/DL (ref 13.3–17.7)
HGB UR QL STRIP: ABNORMAL
KETONES UR STRIP-MCNC: NEGATIVE MG/DL
LEUKOCYTE ESTERASE UR QL STRIP: NEGATIVE
LIPASE SERPL-CCNC: 202 U/L (ref 73–393)
MCH RBC QN AUTO: 31.3 PG (ref 26.5–33)
MCHC RBC AUTO-ENTMCNC: 34.4 G/DL (ref 31.5–36.5)
MCV RBC AUTO: 91 FL (ref 78–100)
MUCOUS THREADS #/AREA URNS LPF: PRESENT /LPF
NITRATE UR QL: NEGATIVE
PH UR STRIP: 6 PH (ref 5–7)
PLATELET # BLD AUTO: 287 10E9/L (ref 150–450)
POTASSIUM SERPL-SCNC: 4.1 MMOL/L (ref 3.4–5.3)
PROT SERPL-MCNC: 7.3 G/DL (ref 6.8–8.8)
RBC # BLD AUTO: 5.24 10E12/L (ref 4.4–5.9)
RBC #/AREA URNS AUTO: 1 /HPF (ref 0–2)
SODIUM SERPL-SCNC: 140 MMOL/L (ref 133–144)
SOURCE: ABNORMAL
SP GR UR STRIP: 1.02 (ref 1–1.03)
UROBILINOGEN UR STRIP-MCNC: 0 MG/DL (ref 0–2)
WBC # BLD AUTO: 11 10E9/L (ref 4–11)
WBC #/AREA URNS AUTO: <1 /HPF (ref 0–5)

## 2018-05-09 PROCEDURE — 80053 COMPREHEN METABOLIC PANEL: CPT | Performed by: FAMILY MEDICINE

## 2018-05-09 PROCEDURE — 36415 COLL VENOUS BLD VENIPUNCTURE: CPT | Performed by: FAMILY MEDICINE

## 2018-05-09 PROCEDURE — 83690 ASSAY OF LIPASE: CPT | Performed by: FAMILY MEDICINE

## 2018-05-09 PROCEDURE — 85027 COMPLETE CBC AUTOMATED: CPT | Performed by: FAMILY MEDICINE

## 2018-05-09 PROCEDURE — 81001 URINALYSIS AUTO W/SCOPE: CPT | Performed by: FAMILY MEDICINE

## 2018-05-09 RX ORDER — OLOPATADINE HYDROCHLORIDE 2 MG/ML
SOLUTION OPHTHALMIC
Qty: 2.5 ML | Refills: 11 | OUTPATIENT
Start: 2018-05-09

## 2018-05-09 NOTE — TELEPHONE ENCOUNTER
Pt was seen in [facility] 2 years ago when Patanol drops were prescribed. He should follow up with his PCP if needing more refills.

## 2018-05-09 NOTE — TELEPHONE ENCOUNTER
Riya 0.2%      Last Written Prescription Date:  9-17-16  Last Fill Quantity: 1,   # refills: 11  Last Office Visit: 12/18/18  Future Office visit:       Routing refill request to provider for review/approval because:  Drug not on the [facility], [facility] or [facility] refill protocol or controlled substance

## 2018-05-10 RX ORDER — OLOPATADINE HYDROCHLORIDE 2 MG/ML
1 SOLUTION/ DROPS OPHTHALMIC DAILY
Qty: 1 BOTTLE | Refills: 11 | OUTPATIENT
Start: 2018-05-10

## 2018-05-10 NOTE — TELEPHONE ENCOUNTER
Patient needs office visit appointment, or better yet, should see an eye care profession to have this prescription refilled.  I haven't seen this patient regarding this issue for over 5 years.  Will have staff notify patient.    Shree Cee MD

## 2018-05-10 NOTE — PROGRESS NOTES
I have not seen this patient in over 2 years and I reviewed his chart and it appears Dr. Estrada last saw him for pancreatitis and likely was the one that ordered the labs.  For some reason they were ordered under my name instead.  I am sending him to Dr. Estrada for review.    Shree Cee MD

## 2018-05-10 NOTE — TELEPHONE ENCOUNTER
Spoke with patient and he would like to see Dr. Cee soon to get this rx. He states he takes this for allergies. Appointment made for tomorrow.  Jaclyn Mckeon, CMA

## 2018-05-11 ENCOUNTER — OFFICE VISIT (OUTPATIENT)
Dept: FAMILY MEDICINE | Facility: CLINIC | Age: 43
End: 2018-05-11
Payer: COMMERCIAL

## 2018-05-11 VITALS
OXYGEN SATURATION: 98 % | BODY MASS INDEX: 26.28 KG/M2 | DIASTOLIC BLOOD PRESSURE: 66 MMHG | HEART RATE: 85 BPM | TEMPERATURE: 98.6 F | SYSTOLIC BLOOD PRESSURE: 114 MMHG | WEIGHT: 188.4 LBS

## 2018-05-11 DIAGNOSIS — J30.1 CHRONIC SEASONAL ALLERGIC RHINITIS DUE TO POLLEN: Primary | ICD-10-CM

## 2018-05-11 DIAGNOSIS — H10.13 ALLERGIC CONJUNCTIVITIS, BILATERAL: ICD-10-CM

## 2018-05-11 PROCEDURE — 99213 OFFICE O/P EST LOW 20 MIN: CPT | Performed by: FAMILY MEDICINE

## 2018-05-11 RX ORDER — OLOPATADINE HYDROCHLORIDE 2 MG/ML
1 SOLUTION/ DROPS OPHTHALMIC DAILY
Qty: 1 BOTTLE | Refills: 11 | Status: SHIPPED | OUTPATIENT
Start: 2018-05-11 | End: 2019-08-22

## 2018-05-11 RX ORDER — FLUTICASONE PROPIONATE 50 MCG
1-2 SPRAY, SUSPENSION (ML) NASAL DAILY
Qty: 42 G | Refills: 3 | Status: SHIPPED | OUTPATIENT
Start: 2018-05-11 | End: 2019-08-22

## 2018-05-11 NOTE — PATIENT INSTRUCTIONS
1.  Saline sinus rinse (one form comes in a squirt bottle form and the another kind is also known as Neti Pots).  Follow directions on package.  May do this 1-2 times per day.  2.  Flonase:  A good idea is to use this medication with saline nasal irrigation.  If you choose to do this, use the Flonase about 30-45 minutes after the sinus rinse to maximize effect of medication.

## 2018-05-11 NOTE — MR AVS SNAPSHOT
"              After Visit Summary   5/11/2018    Fredy Keys    MRN: [id number]           Patient Information     Date Of Birth          1975        Visit Information        Provider Department      5/11/2018 1:15 PM Shree Cee MD [facility]        Today's Diagnoses     Chronic seasonal allergic rhinitis due to pollen    -  1    Allergic conjunctivitis, bilateral          Care Instructions    1.  Saline sinus rinse (one form comes in a squirt bottle form and the another kind is also known as Neti Pots).  Follow directions on package.  May do this 1-2 times per day.  2.  Flonase:  A good idea is to use this medication with saline nasal irrigation.  If you choose to do this, use the Flonase about 30-45 minutes after the sinus rinse to maximize effect of medication.          Follow-ups after your visit        Who to contact     If you have questions or need follow up information about today's clinic visit or your schedule please contact [facility] directly at [phone].  Normal or non-critical lab and imaging results will be communicated to you by [employer], letter or phone within 4 business days after the clinic has received the results. If you do not hear from us within 7 days, please contact the clinic through [employer] or phone. If you have a critical or abnormal lab result, we will notify you by phone as soon as possible.  Submit refill requests through [employer] or call your pharmacy and they will forward the refill request to us. Please allow 3 business days for your refill to be completed.          Additional Information About Your Visit        [employer] Information     [employer] lets you send messages to your doctor, view your test results, renew your prescriptions, schedule appointments and more. To sign up, go to [email or web address] . Click on \"Log in\" on the left side of the screen, which will take you to the Welcome page. Then click on \"Sign up Now\" on the " right side of the page.     You will be asked to enter the access code listed below, as well as some personal information. Please follow the directions to create your username and password.     Your access code is: [email or web address]  Expires: 2018  4:08 PM     Your access code will  in 90 days. If you need help or a new code, please call your [facility] clinic or [phone].        Care EveryWhere ID     This is your Care EveryWhere ID. This could be used by other organizations to access your [facility] medical records  [id number]        Your Vitals Were     Pulse Temperature Pulse Oximetry BMI (Body Mass Index)          85 98.6  F (37  C) (Temporal) 98% 26.28 kg/m2         Blood Pressure from Last 3 Encounters:   18 114/66   17 100/66   17 117/84    Weight from Last 3 Encounters:   18 188 lb 6.4 oz (85.5 kg)   17 185 lb 9.6 oz (84.2 kg)   17 180 lb (81.6 kg)              Today, you had the following     No orders found for display         Where to get your medicines      These medications were sent to [facility] [place] [address]  [address] [place] [facility] [address]     Phone:  [phone]     fluticasone 50 MCG/ACT spray    olopatadine HCl 0.2 % Soln          Primary Care Provider Office Phone # Fax #    Shree Antoine Cee [phone]       [address]  [facility] [address]        Equal Access to Services     [facility]: Hadii aad ku hadasho Soomaali, waaxda luqadaha, qaybta kaalmada alicia estrada . So [facility] [place] [phone].    ATENCIÓN: Si habla español, tiene a arreaga disposición servicios gratuitos de asistencia lingüística. Llame al [phone].    We comply with applicable federal civil rights laws and [place] laws. We do not discriminate on the basis of race, color, national origin, age, disability, sex, sexual orientation, or gender identity.            Thank you!     Thank  you for choosing [facility]  for your care. Our goal is always to provide you with excellent care. Hearing back from our patients is one way we can continue to improve our services. Please take a few minutes to complete the written survey that you may receive in the mail after your visit with us. Thank you!             Your Updated Medication List - Protect others around you: Learn how to safely use, store and throw away your medicines at www.disposemymeds.org.          This list is accurate as of 5/11/18  4:08 PM.  Always use your most recent med list.                   Brand Name Dispense Instructions for use Diagnosis    fluticasone 50 MCG/ACT spray    FLONASE    42 g    Spray 1-2 sprays into both nostrils daily    Chronic seasonal allergic rhinitis due to pollen       ibuprofen 800 MG tablet    ADVIL/MOTRIN    90 tablet    Take 1 tablet (800 mg) by mouth every 8 hours as needed for moderate pain    Reason for consultation       olopatadine HCl 0.2 % Soln    PATADAY    1 Bottle    Apply 1 drop to eye daily    Allergic conjunctivitis, bilateral       ZYRTEC-D 5-120 MG per 12 hr tablet   Generic drug:  cetirizine-psuedoePHEDrine     3 MONTHS    ONE TABLET TWICE DAILY    Allergic rhinitis, cause unspecified

## 2018-05-11 NOTE — PROGRESS NOTES
SUBJECTIVE:   Fredy Keys is a 42 year old male who presents to clinic today for the following health issues:      ALLERGIES     Onset: ongoing    Description:   Nasal congestion: YES  Sneezing: no  Red, itchy eyes: YES    Progression of Symptoms:  same    Accompanying Signs & Symptoms:  Cough: no  Wheezing: no  Rash: no  Sinus/facial pain: YES   History:   Is it seasonal: during any time of the year   History of Asthma: no  Has allergy testing been done: YES    Precipitating factors:   None    Alleviating factors:  Zyrtec, pataday eye drops       Therapies Tried and outcome: zyrtec, eye drops        Problem list and histories reviewed & adjusted, as indicated.  Additional history: as documented      Reviewed and updated as needed this visit by clinical staff  Tobacco  Allergies  Meds  Problems  Med Hx  Surg Hx  Fam Hx  Soc Hx        Reviewed and updated as needed this visit by Provider  Allergies  Meds  Problems         Patient here today to discuss seasonal allergy symptoms.  He especially is having issues with his eyes.  He has had good eye symptom relief with Pataday allergy eyedrops.  He has not seen an eye care professional in over 5 years.  He has had no vision changes or vision issues.  No eye pain.  No history of frequent styes.    Patient uses Flonase for management of his sinus symptoms.  He occasionally uses a Ryanne pot but does not do this regularly.  He states that the sinus congestion has gotten progressively worse over the years.  He is taking once daily Zyrtec-D.    No chest pain, shortness of breath, wheezing.    ROS:  10 point ROS of systems including Constitutional, Eyes, HENT, Respiratory, Cardiovascular, Gastroenterology, Genitourinary, Integumentary, Muscularskeletal, Psychiatric were all negative except for pertinent positives noted in my HPI.     OBJECTIVE:   /66  Pulse 85  Temp 98.6  F (37  C) (Temporal)  Wt 188 lb 6.4 oz (85.5 kg)  SpO2 98%  BMI 26.28 kg/m2  Body  mass index is 26.28 kg/(m^2).  Physical Exam   Constitutional: He appears well-developed and well-nourished.   HENT:   Head: Normocephalic.   Right Ear: Tympanic membrane, external ear and ear canal normal.   Left Ear: Tympanic membrane, external ear and ear canal normal.   Nose: Mucosal edema and rhinorrhea present. Right sinus exhibits no maxillary sinus tenderness and no frontal sinus tenderness. Left sinus exhibits no maxillary sinus tenderness and no frontal sinus tenderness.   Mouth/Throat: Uvula is midline, oropharynx is clear and moist and mucous membranes are normal. Mucous membranes are not dry. No oropharyngeal exudate. No tonsillar exudate.   Eyes: EOM and lids are normal. Pupils are equal, round, and reactive to light. Right eye exhibits discharge (Watery discharge noted). Right eye exhibits no exudate. Left eye exhibits discharge (Watery discharge noted). Left eye exhibits no exudate. Right conjunctiva is injected. Left conjunctiva is injected.   Neck: Normal range of motion. Neck supple. No thyromegaly present.   Cardiovascular: Normal rate, regular rhythm, S1 normal, S2 normal and normal heart sounds.    No murmur heard.  Pulmonary/Chest: Effort normal and breath sounds normal. No respiratory distress. He has no wheezes. He has no rhonchi. He has no rales.   Lymphadenopathy:     He has no cervical adenopathy.   Neurological: He is alert.   Skin: Skin is warm. No rash noted. No erythema.       ASSESSMENT/PLAN:       ICD-10-CM    1. Chronic seasonal allergic rhinitis due to pollen J30.1 fluticasone (FLONASE) 50 MCG/ACT spray   2. Allergic conjunctivitis, bilateral H10.13 olopatadine HCl (PATADAY) 0.2 % SOLN     PLAN:  1.  Patient advised to continue Zyrtec-D as needed for allergy symptoms.  I did advise him to add in an additional regular Zyrtec at bedtime to help with his allergy symptoms.  Flonase prescription given today as well.  He also should use the saline sinus rinse daily about 45 minutes  prior to his use of Flonase to help improve his seasonal allergy management.  2. Okay to refer to ENT if seasonal allergy symptoms not improved in 1-2 months.      Follow up with Provider -minimum 1 year follow-up for above-noted chronic disease management, otherwise as needed.    Shree Cee MD   [facility]

## 2018-06-20 NOTE — PROGRESS NOTES
Let this patient now that his blood test from early May show normal pancreas test normal urine but once again his kidney function test was off a little bit it was off 6 months ago and was normal 4 months ago so I am not clear what is going on.  No we talked about discontinuing nonsteroidal medications which can be challenging to the kidney.  I would just have to recommend a repeat renal function test in a couple of months.  The results of these tests to get sent to the wrong in basket thus the delay

## 2019-01-14 ENCOUNTER — TELEPHONE (OUTPATIENT)
Dept: FAMILY MEDICINE | Facility: CLINIC | Age: 44
End: 2019-01-14

## 2019-01-14 NOTE — TELEPHONE ENCOUNTER
Reason for Call:  Same Day Appointment, Requested Provider:  Shree Cee M.D.    PCP: Merrill Estrada    Reason for visit: Ring Worm    Duration of symptoms: 1 week    Have you been treated for this in the past? No    Additional comments: Patient stating he has tried to clear up on own with no luck. Would like to get in to see Dr Cee as soon as possible. Please call advies    Can we leave a detailed message on this number? YES    Phone number patient can be reached at: Cell number on file:    Telephone Information:   Mobile [phone]       Best Time: any    Call taken on 1/14/2019 at 1:35 PM by Itzel Estrada

## 2019-01-14 NOTE — TELEPHONE ENCOUNTER
Please call pt and offer him Thursday with MAURILIO in a same day or  only.  That is all I have to offer. If someone cancels tomorrow I can call him.  KH

## 2019-08-22 ENCOUNTER — OFFICE VISIT (OUTPATIENT)
Dept: FAMILY MEDICINE | Facility: CLINIC | Age: 44
End: 2019-08-22
Payer: COMMERCIAL

## 2019-08-22 VITALS
SYSTOLIC BLOOD PRESSURE: 106 MMHG | DIASTOLIC BLOOD PRESSURE: 64 MMHG | RESPIRATION RATE: 16 BRPM | WEIGHT: 181.1 LBS | BODY MASS INDEX: 25.93 KG/M2 | OXYGEN SATURATION: 98 % | HEART RATE: 85 BPM | TEMPERATURE: 97.5 F | HEIGHT: 70 IN

## 2019-08-22 DIAGNOSIS — D17.30 LIPOMA OF SKIN AND SUBCUTANEOUS TISSUE: ICD-10-CM

## 2019-08-22 DIAGNOSIS — J01.90 ACUTE NON-RECURRENT SINUSITIS, UNSPECIFIED LOCATION: ICD-10-CM

## 2019-08-22 DIAGNOSIS — H10.13 ALLERGIC CONJUNCTIVITIS, BILATERAL: ICD-10-CM

## 2019-08-22 DIAGNOSIS — Z00.00 ROUTINE GENERAL MEDICAL EXAMINATION AT A HEALTH CARE FACILITY: Primary | ICD-10-CM

## 2019-08-22 DIAGNOSIS — K85.80 OTHER ACUTE PANCREATITIS, UNSPECIFIED COMPLICATION STATUS: ICD-10-CM

## 2019-08-22 DIAGNOSIS — J30.1 CHRONIC SEASONAL ALLERGIC RHINITIS DUE TO POLLEN: ICD-10-CM

## 2019-08-22 LAB
ALBUMIN SERPL-MCNC: 4 G/DL (ref 3.4–5)
ALP SERPL-CCNC: 70 U/L (ref 40–150)
ALT SERPL W P-5'-P-CCNC: 29 U/L (ref 0–70)
ANION GAP SERPL CALCULATED.3IONS-SCNC: 6 MMOL/L (ref 3–14)
AST SERPL W P-5'-P-CCNC: 25 U/L (ref 0–45)
BILIRUB SERPL-MCNC: 0.6 MG/DL (ref 0.2–1.3)
BUN SERPL-MCNC: 23 MG/DL (ref 7–30)
CALCIUM SERPL-MCNC: 9 MG/DL (ref 8.5–10.1)
CHLORIDE SERPL-SCNC: 106 MMOL/L (ref 94–109)
CO2 SERPL-SCNC: 29 MMOL/L (ref 20–32)
CREAT SERPL-MCNC: 1.27 MG/DL (ref 0.66–1.25)
GFR SERPL CREATININE-BSD FRML MDRD: 68 ML/MIN/{1.73_M2}
GLUCOSE SERPL-MCNC: 86 MG/DL (ref 70–99)
LIPASE SERPL-CCNC: 198 U/L (ref 73–393)
POTASSIUM SERPL-SCNC: 3.9 MMOL/L (ref 3.4–5.3)
PROT SERPL-MCNC: 7.3 G/DL (ref 6.8–8.8)
SODIUM SERPL-SCNC: 141 MMOL/L (ref 133–144)

## 2019-08-22 PROCEDURE — 83690 ASSAY OF LIPASE: CPT | Performed by: FAMILY MEDICINE

## 2019-08-22 PROCEDURE — 99213 OFFICE O/P EST LOW 20 MIN: CPT | Mod: 25 | Performed by: FAMILY MEDICINE

## 2019-08-22 PROCEDURE — 99396 PREV VISIT EST AGE 40-64: CPT | Performed by: FAMILY MEDICINE

## 2019-08-22 PROCEDURE — 80053 COMPREHEN METABOLIC PANEL: CPT | Performed by: FAMILY MEDICINE

## 2019-08-22 PROCEDURE — 36415 COLL VENOUS BLD VENIPUNCTURE: CPT | Performed by: FAMILY MEDICINE

## 2019-08-22 RX ORDER — AMOXICILLIN 875 MG
875 TABLET ORAL 2 TIMES DAILY
Qty: 20 TABLET | Refills: 0 | Status: SHIPPED | OUTPATIENT
Start: 2019-08-22 | End: 2019-10-14

## 2019-08-22 RX ORDER — FLUTICASONE PROPIONATE 50 MCG
1-2 SPRAY, SUSPENSION (ML) NASAL DAILY
Qty: 42 G | Refills: 3 | Status: SHIPPED | OUTPATIENT
Start: 2019-08-22 | End: 2020-12-18

## 2019-08-22 RX ORDER — OLOPATADINE HYDROCHLORIDE 2 MG/ML
1 SOLUTION/ DROPS OPHTHALMIC DAILY
Qty: 1 BOTTLE | Refills: 11 | Status: SHIPPED | OUTPATIENT
Start: 2019-08-22 | End: 2020-06-12

## 2019-08-22 ASSESSMENT — ENCOUNTER SYMPTOMS
PARESTHESIAS: 0
HEADACHES: 0
PALPITATIONS: 0
FREQUENCY: 0
JOINT SWELLING: 0
DIZZINESS: 0
FEVER: 0
NERVOUS/ANXIOUS: 0
NAUSEA: 0
SHORTNESS OF BREATH: 1
COUGH: 1
MYALGIAS: 1
HEMATURIA: 0
EYE PAIN: 0
CHILLS: 0
WEAKNESS: 1
ARTHRALGIAS: 1
HEMATOCHEZIA: 0
SORE THROAT: 1
DYSURIA: 0
DIARRHEA: 0
ABDOMINAL PAIN: 0
HEARTBURN: 1

## 2019-08-22 ASSESSMENT — MIFFLIN-ST. JEOR: SCORE: 1716.68

## 2019-08-22 NOTE — PROGRESS NOTES
SUBJECTIVE:   CC: Fredy Keys is an 44 year old male who presents for preventative health visit.     Healthy Habits:     Getting at least 3 servings of Calcium per day:  Yes    Bi-annual eye exam:  NO    Dental care twice a year:  Yes    Sleep apnea or symptoms of sleep apnea:  None    Diet:  Other    Frequency of exercise:  4-5 days/week    Duration of exercise:  Greater than 60 minutes    Taking medications regularly:  Yes    Medication side effects:  Not applicable    PHQ-2 Total Score: 1    Additional concerns today:  Yes                  Patient c/o a pain in his throat and trouble with his sinuses and left ear.  Feels like he might have a sinus infection with some pressure.  He does have seasonal allergies.    Patient is not fasting today but would like labs done.  He is supposed to have a follow-up on his kidney function and lipase as these were slightly elevated at episode of pancreatitis a while back.  We will check these and notify him of results.    Would like a refill of his flonase and pataday eye drops.     Also is concerned about some lipomas that are getting a little larger on its right flank area and lower back.  Would like to consult with general surgery on the sometime in the future.    Today's PHQ-2 Score:   PHQ-2 ( 1999 [employer]) 8/22/2019   Q1: Little interest or pleasure in doing things -   Q2: Feeling down, depressed or hopeless -   PHQ-2 Score -   PHQ-2 Score Incomplete       Abuse: Current or Past(Physical, Sexual or Emotional)- No  Do you feel safe in your environment? Yes    Social History     Tobacco Use     Smoking status: Never Smoker     Smokeless tobacco: Current User     Types: Chew     Tobacco comment: no smoking in the home.  1 tin/week in summer, 1 every other month in winter   Substance Use Topics     Alcohol use: Yes     Alcohol/week: 0.0 oz     Comment: 4-5  drinks weekly     If you drink alcohol do you typically have >3 drinks per day or >7 drinks per week? No    Alcohol Use  7/9/2015   Prescreen: >3 drinks/day or >7 drinks/week? The patient does not drink >3 drinks per day nor >7 drinks per week.   No flowsheet data found.    Last PSA: No results found for: PSA    Reviewed orders with patient. Reviewed health maintenance and updated orders accordingly - Yes      Reviewed and updated as needed this visit by clinical staff         Reviewed and updated as needed this visit by Provider        Past Medical History:   Diagnosis Date     ALLERGIC RHINITIS NOS 8/24/2006      Past Surgical History:   Procedure Laterality Date     ENT SURGERY         Review of Systems   Constitutional: Negative for chills and fever.   HENT: Positive for ear pain and sore throat. Negative for hearing loss.    Eyes: Negative for pain and visual disturbance.   Respiratory: Positive for cough and shortness of breath.    Cardiovascular: Negative for chest pain, palpitations and peripheral edema.   Gastrointestinal: Positive for heartburn. Negative for abdominal pain, diarrhea, hematochezia and nausea.   Genitourinary: Negative for discharge, dysuria, frequency, genital sores, hematuria, impotence and urgency.   Musculoskeletal: Positive for arthralgias and myalgias. Negative for joint swelling.   Skin: Negative for rash.   Neurological: Positive for weakness. Negative for dizziness, headaches and paresthesias.   Psychiatric/Behavioral: Negative for mood changes. The patient is not nervous/anxious.          OBJECTIVE:   There were no vitals taken for this visit.    Physical Exam  GENERAL: healthy, alert and no distress  EYES: Eyes grossly normal to inspection, PERRL and conjunctivae and sclerae normal  HENT: normal cephalic/atraumatic, ear canals and TM's normal, nose and mouth without ulcers or lesions, oropharynx clear, oral mucous membranes moist and sinuses: maxillary tenderness on bilateral  NECK: no adenopathy, no asymmetry, masses, or scars and thyroid normal to palpation  RESP: lungs clear to auscultation - no  rales, rhonchi or wheezes  CV: regular rate and rhythm, normal S1 S2, no S3 or S4, no murmur, click or rub, no peripheral edema and peripheral pulses strong  ABDOMEN: soft, nontender, no hepatosplenomegaly, no masses and bowel sounds normal   (male): testicles normal without atrophy or masses, no hernias and penis normal without urethral discharge  MS: no gross musculoskeletal defects noted, no edema  SKIN: Palpable lipoma in the right flank area in the right lower back lateral to the paraspinous muscles.  Nontender.  NEURO: Normal strength and tone, mentation intact and speech normal  PSYCH: mentation appears normal, affect normal/bright    Diagnostic Test Results:  Labs reviewed in Epic  Results for orders placed or performed in visit on 08/22/19   Comprehensive metabolic panel   Result Value Ref Range    Sodium 141 133 - 144 mmol/L    Potassium 3.9 3.4 - 5.3 mmol/L    Chloride 106 94 - 109 mmol/L    Carbon Dioxide 29 20 - 32 mmol/L    Anion Gap 6 3 - 14 mmol/L    Glucose 86 70 - 99 mg/dL    Urea Nitrogen 23 7 - 30 mg/dL    Creatinine 1.27 (H) 0.66 - 1.25 mg/dL    GFR Estimate 68 >60 mL/min/[1.73_m2]    GFR Estimate If Black 79 >60 mL/min/[1.73_m2]    Calcium 9.0 8.5 - 10.1 mg/dL    Bilirubin Total 0.6 0.2 - 1.3 mg/dL    Albumin 4.0 3.4 - 5.0 g/dL    Protein Total 7.3 6.8 - 8.8 g/dL    Alkaline Phosphatase 70 40 - 150 U/L    ALT 29 0 - 70 U/L    AST 25 0 - 45 U/L   Lipase   Result Value Ref Range    Lipase 198 73 - 393 U/L       ASSESSMENT/PLAN:   1. Routine general medical examination at a health care facility  See below.    2. Chronic seasonal allergic rhinitis due to pollen  We will renew his medications.  - fluticasone (FLONASE) 50 MCG/ACT nasal spray; Spray 1-2 sprays into both nostrils daily  Dispense: 42 g; Refill: 3    3. Allergic conjunctivitis, bilateral  Renew his medications.  - olopatadine (PATADAY) 0.2 % ophthalmic solution; Apply 1 drop to eye daily  Dispense: 1 Bottle; Refill: 11    4. Other  "acute pancreatitis, unspecified complication status  Will notify with results his lipase is normal his kidney function is essentially back to normal.  - Comprehensive metabolic panel  - Lipase  - OFFICE/OUTPT VISIT,EST,LEVL III    5. Acute non-recurrent sinusitis, unspecified location  We will treat him with amoxicillin for this.  He can use his Flonase as well.  - amoxicillin (AMOXIL) 875 MG tablet; Take 1 tablet (875 mg) by mouth 2 times daily  Dispense: 20 tablet; Refill: 0  - OFFICE/OUTPT VISIT,EST,LEVL III    6. Lipoma of skin and subcutaneous tissue  We will put in a referral for general surgery.  - OFFICE/OUTPT VISIT,EST,LEVL III    COUNSELING:   Reviewed preventive health counseling, as reflected in patient instructions       Regular exercise       Healthy diet/nutrition    Estimated body mass index is 26.28 kg/m  as calculated from the following:    Height as of 12/18/17: 1.803 m (5' 11\").    Weight as of 5/11/18: 85.5 kg (188 lb 6.4 oz).          reports that he has never smoked. His smokeless tobacco use includes chew.      Counseling Resources:  ATP IV Guidelines  Pooled Cohorts Equation Calculator  FRAX Risk Assessment  ICSI Preventive Guidelines  Dietary Guidelines for Americans, 2010  USDA's MyPlate  ASA Prophylaxis  Lung CA Screening    Merrill Estrada MD  [facility]  "

## 2019-09-10 ENCOUNTER — TELEPHONE (OUTPATIENT)
Dept: FAMILY MEDICINE | Facility: CLINIC | Age: 44
End: 2019-09-10

## 2019-09-10 DIAGNOSIS — J01.90 ACUTE NON-RECURRENT SINUSITIS, UNSPECIFIED LOCATION: Primary | ICD-10-CM

## 2019-09-10 DIAGNOSIS — J30.1 CHRONIC SEASONAL ALLERGIC RHINITIS DUE TO POLLEN: ICD-10-CM

## 2019-09-10 NOTE — LETTER
September 10, 2019      Fredy Keys  [id number] [address] [address]        Dear ,    We are writing to inform you of your test results.    Labs showed pretty normal chemistry panel again a borderline creatinine test which is kidney function.  But this is better than previous.  Serum lipase for pancreas was in the normal range.    Resulted Orders   Comprehensive metabolic panel   Result Value Ref Range    Sodium 141 133 - 144 mmol/L    Potassium 3.9 3.4 - 5.3 mmol/L    Chloride 106 94 - 109 mmol/L    Carbon Dioxide 29 20 - 32 mmol/L    Anion Gap 6 3 - 14 mmol/L    Glucose 86 70 - 99 mg/dL    Urea Nitrogen 23 7 - 30 mg/dL    Creatinine 1.27 (H) 0.66 - 1.25 mg/dL    GFR Estimate 68 >60 mL/min/[1.73_m2]      Comment:      Non  GFR Calc  Starting 12/18/2018, serum creatinine based estimated GFR (eGFR) will be   calculated using the Chronic Kidney Disease Epidemiology Collaboration   (CKD-EPI) equation.      GFR Estimate If Black 79 >60 mL/min/[1.73_m2]      Comment:       GFR Calc  Starting 12/18/2018, serum creatinine based estimated GFR (eGFR) will be   calculated using the Chronic Kidney Disease Epidemiology Collaboration   (CKD-EPI) equation.      Calcium 9.0 8.5 - 10.1 mg/dL    Bilirubin Total 0.6 0.2 - 1.3 mg/dL    Albumin 4.0 3.4 - 5.0 g/dL    Protein Total 7.3 6.8 - 8.8 g/dL    Alkaline Phosphatase 70 40 - 150 U/L    ALT 29 0 - 70 U/L    AST 25 0 - 45 U/L   Lipase   Result Value Ref Range    Lipase 198 73 - 393 U/L       If you have any questions or concerns, please call the clinic at the number listed above.       Sincerely,        Merrill Estrada MD

## 2019-09-10 NOTE — TELEPHONE ENCOUNTER
Patient is requesting Referral to ENT. ENT referral is teed up, please review and approve if appropriate.     Yue Bee, CMA

## 2019-09-10 NOTE — TELEPHONE ENCOUNTER
Pt called back and relayed message below. He wants a mailed copy of his results. He also wants a referral for ENT - Dr. Pedersen. Please call and advise when referral has been placed.

## 2019-09-10 NOTE — TELEPHONE ENCOUNTER
Tried to reach patient, left message for patient to call the clinic back.    Yue Bee, [facility]

## 2019-09-10 NOTE — TELEPHONE ENCOUNTER
----- Message from Merrill Estrada MD sent at 9/10/2019 12:07 PM CDT -----  Labs showed pretty normal chemistry panel again a borderline creatinine test which is kidney function.  But this is better than previous.  Serum lipase for pancreas was in the normal range.

## 2019-09-10 NOTE — RESULT ENCOUNTER NOTE
Labs showed pretty normal chemistry panel again a borderline creatinine test which is kidney function.  But this is better than previous.  Serum lipase for pancreas was in the normal range.

## 2019-09-11 NOTE — TELEPHONE ENCOUNTER
Routing to . Does patient need to be called? If not please close encounter. Thank you.  Maritza Bates on 9/11/2019 at 2:01 PM

## 2019-10-11 NOTE — PROGRESS NOTES
ENT Consultation    Fredy Keys is a 44 year old male who is seen in consultation at the request of self.        Chief Complaint -   Chief Complaint   Patient presents with     Consult     sinuses       History of Present Illness - Fredy Keys is a 44 year old male who presents with concerns about sinus symptoms.  The patient for a number of years has had trouble with nasal congestion bilaterally some obstruction more on the left than the right side as well as pressure in the frontal area is only not in the maxillary areas but pressure in the frontal and fair number of secretions postnasally mostly clear rarely yellowish.  Denies any fever chills sense of smell and taste appear to be intact.  He he is not aware of any allergies or has not been tested for those.  Denies any history of nasal trauma recently.  Past Medical History -   Past Medical History:   Diagnosis Date     ALLERGIC RHINITIS NOS 8/24/2006       Current Medications -   Current Outpatient Medications:      amoxicillin (AMOXIL) 875 MG tablet, Take 1 tablet (875 mg) by mouth 2 times daily, Disp: 20 tablet, Rfl: 0     fluticasone (FLONASE) 50 MCG/ACT nasal spray, Spray 1-2 sprays into both nostrils daily, Disp: 42 g, Rfl: 3     ibuprofen (ADVIL/MOTRIN) 800 MG tablet, Take 1 tablet (800 mg) by mouth every 8 hours as needed for moderate pain (Patient not taking: Reported on 12/18/2017), Disp: 90 tablet, Rfl: 1     olopatadine (PATADAY) 0.2 % ophthalmic solution, Apply 1 drop to eye daily, Disp: 1 Bottle, Rfl: 11     ZYRTEC-D 5-120 MG OR TB12, ONE TABLET TWICE DAILY (Patient not taking: No sig reported), Disp: 3 MONTHS, Rfl: 1 YEAR    Allergies -   Allergies   Allergen Reactions     Zithromax [Azithromycin Dihydrate] Hives     Seasonal Allergies        Social History -   Social History     Socioeconomic History     Marital status:      Spouse name: Linn     Number of children: Not on file     Years of education: Not on file     Highest  education level: Not on file   Occupational History     Not on file   Social Needs     Financial resource strain: Not on file     Food insecurity:     Worry: Not on file     Inability: Not on file     Transportation needs:     Medical: Not on file     Non-medical: Not on file   Tobacco Use     Smoking status: Never Smoker     Smokeless tobacco: Current User     Types: Chew     Tobacco comment: no smoking in the home.  1 tin/week in summer, 1 every other month in winter   Substance and Sexual Activity     Alcohol use: Yes     Alcohol/week: 0.0 standard drinks     Comment: 4-5  drinks weekly     Drug use: No     Sexual activity: Yes     Partners: Female   Lifestyle     Physical activity:     Days per week: Not on file     Minutes per session: Not on file     Stress: Not on file   Relationships     Social connections:     Talks on phone: Not on file     Gets together: Not on file     Attends Restoration service: Not on file     Active member of club or organization: Not on file     Attends meetings of clubs or organizations: Not on file     Relationship status: Not on file     Intimate partner violence:     Fear of current or ex partner: Not on file     Emotionally abused: Not on file     Physically abused: Not on file     Forced sexual activity: Not on file   Other Topics Concern     Parent/sibling w/ CABG, MI or angioplasty before 65F 55M? No   Social History Narrative     Not on file       Family History -   Family History   Problem Relation Age of Onset     Genitourinary Problems Father         kidney disease?     Circulatory Father         Factor 5      Thyroid Disease Mother         thyroid cancer     Osteoporosis Mother      Diabetes Paternal Grandmother        Review of Systems - As per HPI and PMHx, otherwise system review of the head and neck negative.    Physical Exam  There were no vitals taken for this visit.  BMI - There is no height or weight on file to calculate BMI.    General - The patient is well  nourished and well developed, and appears to have good nutritional status.  Alert and oriented to person and place, answers questions and cooperates with examination appropriately.     SKIN - No suspicious lesions or rashes.  Respiration - No respiratory distress.    Head and Face - Normocephalic and atraumatic, with no gross asymmetry noted of the contour of the facial features.  The facial nerve is intact, with strong symmetric movements.    Voice and Breathing - The patient was breathing comfortably without the use of accessory muscles. There was no wheezing, stridor, or stertor.  The patients voice was clear and strong, and had appropriate pitch and quality.    Ears - Bilateral pinna and EACs with normal appearing overlying skin. Tympanic membrane intact with good mobility on pneumatic otoscopy bilaterally. Bony landmarks of the ossicular chain are normal. The tympanic membranes are normal in appearance. No retraction, perforation, or masses.  No fluid or purulence was seen in the external canal or the middle ear.     Eyes - Extraocular movements intact.  Sclera were not icteric or injected, conjunctiva were pink and moist.    Mouth - Examination of the oral cavity showed pink, healthy oral mucosa. No lesions or ulcerations noted.  The tongue was mobile and midline, and the dentition were in good condition.      Throat - The walls of the oropharynx were smooth, pink, moist, symmetric, and had no lesions or ulcerations.  The tonsillar pillars and soft palate were symmetric.  The uvula was midline on elevation.    Neck - Normal midline excursion of the laryngotracheal complex during swallowing.  Full range of motion on passive movement.  Palpation of the occipital, submental, submandibular, internal jugular chain, and supraclavicular nodes did not demonstrate any abnormal lymph nodes or masses.  The carotid pulse was palpable bilaterally.  Palpation of the thyroid was soft and smooth, with no nodules or goiter  appreciated.  The trachea was mobile and midline.    Nose - External contour is symmetric, no gross deflection or scars.  Nasal mucosa is pale and moist with no abnormal mucus.  The septum was deviated superiorly to the left and anteriorly inferiorly to the right and somewhat obstructive, turbinates of enlarged size and position.  No polyps, masses, or purulence noted on examination.        Performed in clinic today:  To further evaluate the nasal cavity, I performed rigid nasal endoscopy.  I first sprayed the nasal cavity bilaterally with a mix of lidocaine and neosynephrine.  I then began on the left side using a 2.7mm, 30 degree rigid nasal endoscope.  The septum was deviated and the nasal airway was open.  No abnormal secretions, purulence, or polyps were noted. The left middle turbinate and middle meatus were clearly visualized and normal in appearance.  Looking up, the olfactory cleft was unobstructed.  Going further back, the sphenoethmoid recess was normal in appearance, with healthy appearing mucosa on the face of the sphenoid.  The nasopharynx was unremarkable, and the eustachian tube opening on this side was unobstructed.    I then turned my attention to the right side.  Once again, the septum was deviated, and the airway was open.  No abnormal secretions, purulence, polyps were noted.  The right middle turbinate and middle meatus were clearly visualized and normal in appearance.  Looking up, the olfactory cleft was unobstructed.  Going further back the right sphenoethmoid recess was normal in appearance, and eustachian tube opening was unobstructed.   White - [id number] Mytamed      ASSESSMENT/PLAN:  Fredy Keys is a 44 year old male with some structural issues with deviated septum enlarged turbinates but also possibility of frontal symptomatic sinusitis.  He might also have some allergic rhinitis issues and would like to drive some of the secretions using examination of Astelin with fluticasone.    At  this point I would like to obtain CT of the sinuses to further evaluate the problem and see him back in follow-up..    Jeovany Pedersen MD

## 2019-10-14 ENCOUNTER — OFFICE VISIT (OUTPATIENT)
Dept: OTOLARYNGOLOGY | Facility: CLINIC | Age: 44
End: 2019-10-14
Payer: COMMERCIAL

## 2019-10-14 ENCOUNTER — HOSPITAL ENCOUNTER (OUTPATIENT)
Dept: CT IMAGING | Facility: CLINIC | Age: 44
Discharge: HOME | End: 2019-10-14
Attending: OTOLARYNGOLOGY | Admitting: OTOLARYNGOLOGY
Payer: COMMERCIAL

## 2019-10-14 VITALS
BODY MASS INDEX: 25.77 KG/M2 | SYSTOLIC BLOOD PRESSURE: 134 MMHG | HEIGHT: 70 IN | DIASTOLIC BLOOD PRESSURE: 86 MMHG | WEIGHT: 180 LBS

## 2019-10-14 DIAGNOSIS — J32.1 CHRONIC FRONTAL SINUSITIS: Primary | ICD-10-CM

## 2019-10-14 DIAGNOSIS — J32.1 CHRONIC FRONTAL SINUSITIS: ICD-10-CM

## 2019-10-14 PROCEDURE — 31231 NASAL ENDOSCOPY DX: CPT | Performed by: OTOLARYNGOLOGY

## 2019-10-14 PROCEDURE — 70486 CT MAXILLOFACIAL W/O DYE: CPT

## 2019-10-14 PROCEDURE — 99203 OFFICE O/P NEW LOW 30 MIN: CPT | Mod: 25 | Performed by: OTOLARYNGOLOGY

## 2019-10-14 ASSESSMENT — MIFFLIN-ST. JEOR: SCORE: 1711.69

## 2019-10-14 ASSESSMENT — PAIN SCALES - GENERAL: PAINLEVEL: SEVERE PAIN (6)

## 2019-10-14 NOTE — LETTER
10/14/2019         RE: Fredy Keys  [id number] [facility] [id number]        Dear Colleague,    Thank you for referring your patient, Fredy Keys, to the [facility]. Please see a copy of my visit note below.    ENT Consultation    Fredy Keys is a 44 year old male who is seen in consultation at the request of self.        Chief Complaint -   Chief Complaint   Patient presents with     Consult     sinuses       History of Present Illness - Fredy Keys is a 44 year old male who presents with concerns about sinus symptoms.  The patient for a number of years has had trouble with nasal congestion bilaterally some obstruction more on the left than the right side as well as pressure in the frontal area is only not in the maxillary areas but pressure in the frontal and fair number of secretions postnasally mostly clear rarely yellowish.  Denies any fever chills sense of smell and taste appear to be intact.  He he is not aware of any allergies or has not been tested for those.  Denies any history of nasal trauma recently.  Past Medical History -   Past Medical History:   Diagnosis Date     ALLERGIC RHINITIS NOS 8/24/2006       Current Medications -   Current Outpatient Medications:      amoxicillin (AMOXIL) 875 MG tablet, Take 1 tablet (875 mg) by mouth 2 times daily, Disp: 20 tablet, Rfl: 0     fluticasone (FLONASE) 50 MCG/ACT nasal spray, Spray 1-2 sprays into both nostrils daily, Disp: 42 g, Rfl: 3     ibuprofen (ADVIL/MOTRIN) 800 MG tablet, Take 1 tablet (800 mg) by mouth every 8 hours as needed for moderate pain (Patient not taking: Reported on 12/18/2017), Disp: 90 tablet, Rfl: 1     olopatadine (PATADAY) 0.2 % ophthalmic solution, Apply 1 drop to eye daily, Disp: 1 Bottle, Rfl: 11     ZYRTEC-D 5-120 MG OR TB12, ONE TABLET TWICE DAILY (Patient not taking: No sig reported), Disp: 3 MONTHS, Rfl: 1 YEAR    Allergies -   Allergies   Allergen Reactions     Zithromax [Azithromycin Dihydrate]  Hives     Seasonal Allergies        Social History -   Social History     Socioeconomic History     Marital status:      Spouse name: Linn     Number of children: Not on file     Years of education: Not on file     Highest education level: Not on file   Occupational History     Not on file   Social Needs     Financial resource strain: Not on file     Food insecurity:     Worry: Not on file     Inability: Not on file     Transportation needs:     Medical: Not on file     Non-medical: Not on file   Tobacco Use     Smoking status: Never Smoker     Smokeless tobacco: Current User     Types: Chew     Tobacco comment: no smoking in the home.  1 tin/week in summer, 1 every other month in winter   Substance and Sexual Activity     Alcohol use: Yes     Alcohol/week: 0.0 standard drinks     Comment: 4-5  drinks weekly     Drug use: No     Sexual activity: Yes     Partners: Female   Lifestyle     Physical activity:     Days per week: Not on file     Minutes per session: Not on file     Stress: Not on file   Relationships     Social connections:     Talks on phone: Not on file     Gets together: Not on file     Attends Sikh service: Not on file     Active member of club or organization: Not on file     Attends meetings of clubs or organizations: Not on file     Relationship status: Not on file     Intimate partner violence:     Fear of current or ex partner: Not on file     Emotionally abused: Not on file     Physically abused: Not on file     Forced sexual activity: Not on file   Other Topics Concern     Parent/sibling w/ CABG, MI or angioplasty before 65F 55M? No   Social History Narrative     Not on file       Family History -   Family History   Problem Relation Age of Onset     Genitourinary Problems Father         kidney disease?     Circulatory Father         Factor 5      Thyroid Disease Mother         thyroid cancer     Osteoporosis Mother      Diabetes Paternal Grandmother        Review of Systems - As  per HPI and PMHx, otherwise system review of the head and neck negative.    Physical Exam  There were no vitals taken for this visit.  BMI - There is no height or weight on file to calculate BMI.    General - The patient is well nourished and well developed, and appears to have good nutritional status.  Alert and oriented to person and place, answers questions and cooperates with examination appropriately.     SKIN - No suspicious lesions or rashes.  Respiration - No respiratory distress.    Head and Face - Normocephalic and atraumatic, with no gross asymmetry noted of the contour of the facial features.  The facial nerve is intact, with strong symmetric movements.    Voice and Breathing - The patient was breathing comfortably without the use of accessory muscles. There was no wheezing, stridor, or stertor.  The patients voice was clear and strong, and had appropriate pitch and quality.    Ears - Bilateral pinna and EACs with normal appearing overlying skin. Tympanic membrane intact with good mobility on pneumatic otoscopy bilaterally. Bony landmarks of the ossicular chain are normal. The tympanic membranes are normal in appearance. No retraction, perforation, or masses.  No fluid or purulence was seen in the external canal or the middle ear.     Eyes - Extraocular movements intact.  Sclera were not icteric or injected, conjunctiva were pink and moist.    Mouth - Examination of the oral cavity showed pink, healthy oral mucosa. No lesions or ulcerations noted.  The tongue was mobile and midline, and the dentition were in good condition.      Throat - The walls of the oropharynx were smooth, pink, moist, symmetric, and had no lesions or ulcerations.  The tonsillar pillars and soft palate were symmetric.  The uvula was midline on elevation.    Neck - Normal midline excursion of the laryngotracheal complex during swallowing.  Full range of motion on passive movement.  Palpation of the occipital, submental,  submandibular, internal jugular chain, and supraclavicular nodes did not demonstrate any abnormal lymph nodes or masses.  The carotid pulse was palpable bilaterally.  Palpation of the thyroid was soft and smooth, with no nodules or goiter appreciated.  The trachea was mobile and midline.    Nose - External contour is symmetric, no gross deflection or scars.  Nasal mucosa is pale and moist with no abnormal mucus.  The septum was deviated superiorly to the left and anteriorly inferiorly to the right and somewhat obstructive, turbinates of enlarged size and position.  No polyps, masses, or purulence noted on examination.        Performed in clinic today:  To further evaluate the nasal cavity, I performed rigid nasal endoscopy.  I first sprayed the nasal cavity bilaterally with a mix of lidocaine and neosynephrine.  I then began on the left side using a 2.7mm, 30 degree rigid nasal endoscope.  The septum was deviated and the nasal airway was open.  No abnormal secretions, purulence, or polyps were noted. The left middle turbinate and middle meatus were clearly visualized and normal in appearance.  Looking up, the olfactory cleft was unobstructed.  Going further back, the sphenoethmoid recess was normal in appearance, with healthy appearing mucosa on the face of the sphenoid.  The nasopharynx was unremarkable, and the eustachian tube opening on this side was unobstructed.    I then turned my attention to the right side.  Once again, the septum was deviated, and the airway was open.  No abnormal secretions, purulence, polyps were noted.  The right middle turbinate and middle meatus were clearly visualized and normal in appearance.  Looking up, the olfactory cleft was unobstructed.  Going further back the right sphenoethmoid recess was normal in appearance, and eustachian tube opening was unobstructed.   White - [id number] Mytamed      ASSESSMENT/PLAN:  Fredy Keys is a 44 year old male with some structural issues with  deviated septum enlarged turbinates but also possibility of frontal symptomatic sinusitis.  He might also have some allergic rhinitis issues and would like to drive some of the secretions using examination of Astelin with fluticasone.    At this point I would like to obtain CT of the sinuses to further evaluate the problem and see him back in follow-up..    Jeovany Pedersen MD    Again, thank you for allowing me to participate in the care of your patient.        Sincerely,        Jeovany Pedersen MD, MD

## 2019-10-28 NOTE — PROGRESS NOTES
History of Present Illness - Fredy Keys is a 44 year old male presenting in clinic today for a recheck on Patient presents with:  Sinusitis  RECHECK    Patient with a history of chronic sinus pressure frontal sinus pressure without previous history of migraines photophobia any other reasons for headaches had a CT of his sinuses.  He was on Astelin and Flonase without significant improvement other than drying up secretions a little bit.  Sinus CT findings are as follows:    FINDINGS:   Frontal sinuses: No mucosal thickening. The frontal sinus drainage  pathways are clear.      Ethmoid sinuses: Mild mucosal thickening in the posterior ethmoid air  cells bilaterally. There are bilateral supraorbital ethmoid air cells.     Right maxillary sinus: Polypoid mucosal thickening along the inferior  and superomedial aspect of the right maxillary sinus. The polypoid  lesion along the inferior right maxillary sinus measures greater than  1 cm in width. There is soft tissue debris occluding the right  maxillary sinus ostium. The infundibulum appears patent.      Left maxillary sinus: Mild mucosal thickening along the inferior left  maxillary sinus measuring less than 3 mm in width. The ostiomeatal  unit is normal with a patent infundibulum. Patent secondary sinus  ostium posteriorly measuring 0.5 cm in craniocaudal dimension.     Sphenoid sinus: No mucosal thickening. The sphenoid sinus ostia and  sphenoethmoidal recesses are clear.      Nasal septum: Nasal septum is deviated to the left anteriorly.      Turbinates and nasal cavity: No nasal mucosal thickening. The  turbinates are unremarkable.      Laminae papyracea and cribriform plate: Normal.     Other findings: Low mAs images of the brain are unremarkable. No  suspicious lucencies around the visualized teeth.                                                                       IMPRESSION:   1. Scattered mucosal thickening in the maxillary sinuses and ethmoid  air cells,  most pronounced in the right maxillary sinus as detailed  above.  2. Occlusion of the right maxillary sinus ostium by soft tissue  however the infundibulum remains patent.  3. Frontal sinuses and sphenoid sinuses appear clear.  4. Leftward deviation of the nasal septum anteriorly    BP Readings from Last 1 Encounters:   10/14/19 134/86       BP noted to be well controlled today in office.     Fredy IS NOT a smoker/uses chewing tobacco.       Past Medical History -   Past Medical History:   Diagnosis Date     ALLERGIC RHINITIS NOS 8/24/2006       Current Medications -   Current Outpatient Medications:      fluticasone (FLONASE) 50 MCG/ACT nasal spray, Spray 1-2 sprays into both nostrils daily, Disp: 42 g, Rfl: 3     ibuprofen (ADVIL/MOTRIN) 800 MG tablet, Take 1 tablet (800 mg) by mouth every 8 hours as needed for moderate pain, Disp: 90 tablet, Rfl: 1     olopatadine (PATADAY) 0.2 % ophthalmic solution, Apply 1 drop to eye daily, Disp: 1 Bottle, Rfl: 11     ZYRTEC-D 5-120 MG OR TB12, ONE TABLET TWICE DAILY, Disp: 3 MONTHS, Rfl: 1 YEAR    Allergies -   Allergies   Allergen Reactions     Zithromax [Azithromycin Dihydrate] Hives     Seasonal Allergies        Social History -   Social History     Socioeconomic History     Marital status:      Spouse name: Linn     Number of children: Not on file     Years of education: Not on file     Highest education level: Not on file   Occupational History     Not on file   Social Needs     Financial resource strain: Not on file     Food insecurity:     Worry: Not on file     Inability: Not on file     Transportation needs:     Medical: Not on file     Non-medical: Not on file   Tobacco Use     Smoking status: Never Smoker     Smokeless tobacco: Former User     Types: Chew     Tobacco comment: no smoking in the home.  1 tin/week in summer, 1 every other month in winter   Substance and Sexual Activity     Alcohol use: Yes     Alcohol/week: 0.0 standard drinks     Comment:  4-5  drinks weekly     Drug use: No     Sexual activity: Yes     Partners: Female   Lifestyle     Physical activity:     Days per week: Not on file     Minutes per session: Not on file     Stress: Not on file   Relationships     Social connections:     Talks on phone: Not on file     Gets together: Not on file     Attends Zoroastrianism service: Not on file     Active member of club or organization: Not on file     Attends meetings of clubs or organizations: Not on file     Relationship status: Not on file     Intimate partner violence:     Fear of current or ex partner: Not on file     Emotionally abused: Not on file     Physically abused: Not on file     Forced sexual activity: Not on file   Other Topics Concern     Parent/sibling w/ CABG, MI or angioplasty before 65F 55M? No   Social History Narrative     Not on file       Family History -   Family History   Problem Relation Age of Onset     Genitourinary Problems Father         kidney disease?     Circulatory Father         Factor 5      Thyroid Disease Mother         thyroid cancer     Osteoporosis Mother      Diabetes Paternal Grandmother        Review of Systems - As per HPI and PMHx, otherwise review of system review of the head and neck negative. Otherwise 10+ review of system is negative    Physical Exam  There were no vitals taken for this visit.  BMI: There is no height or weight on file to calculate BMI.    General - The patient is well nourished and well developed, and appears to have good nutritional status.  Alert and oriented to person and place, answers questions and cooperates with examination appropriately.    SKIN - No suspicious lesions or rashes.  Respiration - No respiratory distress.  Head and Face - Normocephalic and atraumatic, with no gross asymmetry noted of the contour of the facial features.  The facial nerve is intact, with strong symmetric movements.    Voice and Breathing - The patient was breathing comfortably without the use of  accessory muscles. The patients voice was clear and strong, and had appropriate pitch and quality.    Ears - Bilateral pinna and EACs with normal appearing overlying skin. Tympanic membrane intact with good mobility on pneumatic otoscopy bilaterally. Bony landmarks of the ossicular chain are normal. The tympanic membranes are normal in appearance. No retraction, perforation, or masses.  No fluid or purulence was seen in the external canal or the middle ear.     Eyes - Extraocular movements intact.  Sclera were not icteric or injected, conjunctiva were pink and moist.    Mouth - Examination of the oral cavity showed pink, healthy oral mucosa. No lesions or ulcerations noted.  The tongue was mobile and midline, and the dentition were in good condition.      Throat - The walls of the oropharynx were smooth, pink, moist, symmetric, and had no lesions or ulcerations.  The tonsillar pillars and soft palate were symmetric. Tonsils are symmetric. The uvula was midline on elevation.    Neck - Normal midline excursion of the laryngotracheal complex during swallowing.  Full range of motion on passive movement.  Palpation of the occipital, submental, submandibular, internal jugular chain, and supraclavicular nodes did not demonstrate any abnormal lymph nodes or masses.  The carotid pulse was palpable bilaterally.  Palpation of the thyroid was soft and smooth, with no nodules or goiter appreciated.  The trachea was mobile and midline.    Nose - External contour is symmetric, no gross deflection or scars.  Nasal mucosa is pink and moist with no abnormal mucus.  The septum was deviated inferiorly to the right and superior to the left and partly obstructive, turbinates of enlarged size and position.  No polyps, masses, or purulence noted on examination.    Neuro - Nonfocal neuro exam is normal, CN 2 through 12 intact, normal gait and muscle tone.      Performed in clinic today:  No procedures preformed in clinic today      A/P -  Fredy Keys is a 44 year old male Patient presents with:  Sinusitis  RECHECK    We discussed the fact that his frontal sinus were quite clear nasofrontal recess is very open.  The incidental findings of right maxillary sinusitis especially with a narrow OM you without symptoms probably not the explanation of his symptoms of the larger scale.  Definite structural issues discussed with the nose that is septum and turbinates.  He is not interested in surgical option right now he will continue with sprays.  In regard to his frontal headaches will certainly advised him to potentially see neurologist for further evaluation.  He will think about it considering his more chronic process and does not involve anything else.  He will see me back in a couple months to see how he is doing and follow through with his nasal sinus issues.      Jeovany Pedersen MD

## 2019-11-04 ENCOUNTER — OFFICE VISIT (OUTPATIENT)
Dept: OTOLARYNGOLOGY | Facility: CLINIC | Age: 44
End: 2019-11-04
Payer: COMMERCIAL

## 2019-11-04 VITALS
HEIGHT: 70 IN | DIASTOLIC BLOOD PRESSURE: 86 MMHG | WEIGHT: 180 LBS | BODY MASS INDEX: 25.77 KG/M2 | SYSTOLIC BLOOD PRESSURE: 126 MMHG

## 2019-11-04 DIAGNOSIS — J31.0 CHRONIC RHINITIS: Primary | ICD-10-CM

## 2019-11-04 PROCEDURE — 99213 OFFICE O/P EST LOW 20 MIN: CPT | Performed by: OTOLARYNGOLOGY

## 2019-11-04 RX ORDER — AZELASTINE 1 MG/ML
2 SPRAY, METERED NASAL 2 TIMES DAILY
Qty: 30 ML | Refills: 3 | Status: SHIPPED | OUTPATIENT
Start: 2019-11-04 | End: 2020-06-12

## 2019-11-04 ASSESSMENT — MIFFLIN-ST. JEOR: SCORE: 1711.69

## 2019-11-04 ASSESSMENT — PAIN SCALES - GENERAL: PAINLEVEL: NO PAIN (0)

## 2019-11-04 NOTE — LETTER
11/4/2019         RE: Fredy Keys  [address] [facility] [address]        Dear Colleague,    Thank you for referring your patient, Fredy Keys, to the [facility]. Please see a copy of my visit note below.    History of Present Illness - Fredy Keys is a 44 year old male presenting in clinic today for a recheck on Patient presents with:  Sinusitis  RECHECK    Patient with a history of chronic sinus pressure frontal sinus pressure without previous history of migraines photophobia any other reasons for headaches had a CT of his sinuses.  He was on Astelin and Flonase without significant improvement other than drying up secretions a little bit.  Sinus CT findings are as follows:    FINDINGS:   Frontal sinuses: No mucosal thickening. The frontal sinus drainage  pathways are clear.      Ethmoid sinuses: Mild mucosal thickening in the posterior ethmoid air  cells bilaterally. There are bilateral supraorbital ethmoid air cells.     Right maxillary sinus: Polypoid mucosal thickening along the inferior  and superomedial aspect of the right maxillary sinus. The polypoid  lesion along the inferior right maxillary sinus measures greater than  1 cm in width. There is soft tissue debris occluding the right  maxillary sinus ostium. The infundibulum appears patent.      Left maxillary sinus: Mild mucosal thickening along the inferior left  maxillary sinus measuring less than 3 mm in width. The ostiomeatal  unit is normal with a patent infundibulum. Patent secondary sinus  ostium posteriorly measuring 0.5 cm in craniocaudal dimension.     Sphenoid sinus: No mucosal thickening. The sphenoid sinus ostia and  sphenoethmoidal recesses are clear.      Nasal septum: Nasal septum is deviated to the left anteriorly.      Turbinates and nasal cavity: No nasal mucosal thickening. The  turbinates are unremarkable.      Laminae papyracea and cribriform plate: Normal.     Other findings: Low mAs images of the brain  are unremarkable. No  suspicious lucencies around the visualized teeth.                                                                       IMPRESSION:   1. Scattered mucosal thickening in the maxillary sinuses and ethmoid  air cells, most pronounced in the right maxillary sinus as detailed  above.  2. Occlusion of the right maxillary sinus ostium by soft tissue  however the infundibulum remains patent.  3. Frontal sinuses and sphenoid sinuses appear clear.  4. Leftward deviation of the nasal septum anteriorly    BP Readings from Last 1 Encounters:   10/14/19 134/86       BP noted to be well controlled today in office.     Fredy IS NOT a smoker/uses chewing tobacco.       Past Medical History -   Past Medical History:   Diagnosis Date     ALLERGIC RHINITIS NOS 8/24/2006       Current Medications -   Current Outpatient Medications:      fluticasone (FLONASE) 50 MCG/ACT nasal spray, Spray 1-2 sprays into both nostrils daily, Disp: 42 g, Rfl: 3     ibuprofen (ADVIL/MOTRIN) 800 MG tablet, Take 1 tablet (800 mg) by mouth every 8 hours as needed for moderate pain, Disp: 90 tablet, Rfl: 1     olopatadine (PATADAY) 0.2 % ophthalmic solution, Apply 1 drop to eye daily, Disp: 1 Bottle, Rfl: 11     ZYRTEC-D 5-120 MG OR TB12, ONE TABLET TWICE DAILY, Disp: 3 MONTHS, Rfl: 1 YEAR    Allergies -   Allergies   Allergen Reactions     Zithromax [Azithromycin Dihydrate] Hives     Seasonal Allergies        Social History -   Social History     Socioeconomic History     Marital status:      Spouse name: Linn     Number of children: Not on file     Years of education: Not on file     Highest education level: Not on file   Occupational History     Not on file   Social Needs     Financial resource strain: Not on file     Food insecurity:     Worry: Not on file     Inability: Not on file     Transportation needs:     Medical: Not on file     Non-medical: Not on file   Tobacco Use     Smoking status: Never Smoker     Smokeless  tobacco: Former User     Types: Chew     Tobacco comment: no smoking in the home.  1 tin/week in summer, 1 every other month in winter   Substance and Sexual Activity     Alcohol use: Yes     Alcohol/week: 0.0 standard drinks     Comment: 4-5  drinks weekly     Drug use: No     Sexual activity: Yes     Partners: Female   Lifestyle     Physical activity:     Days per week: Not on file     Minutes per session: Not on file     Stress: Not on file   Relationships     Social connections:     Talks on phone: Not on file     Gets together: Not on file     Attends Islam service: Not on file     Active member of club or organization: Not on file     Attends meetings of clubs or organizations: Not on file     Relationship status: Not on file     Intimate partner violence:     Fear of current or ex partner: Not on file     Emotionally abused: Not on file     Physically abused: Not on file     Forced sexual activity: Not on file   Other Topics Concern     Parent/sibling w/ CABG, MI or angioplasty before 65F 55M? No   Social History Narrative     Not on file       Family History -   Family History   Problem Relation Age of Onset     Genitourinary Problems Father         kidney disease?     Circulatory Father         Factor 5      Thyroid Disease Mother         thyroid cancer     Osteoporosis Mother      Diabetes Paternal Grandmother        Review of Systems - As per HPI and PMHx, otherwise review of system review of the head and neck negative. Otherwise 10+ review of system is negative    Physical Exam  There were no vitals taken for this visit.  BMI: There is no height or weight on file to calculate BMI.    General - The patient is well nourished and well developed, and appears to have good nutritional status.  Alert and oriented to person and place, answers questions and cooperates with examination appropriately.    SKIN - No suspicious lesions or rashes.  Respiration - No respiratory distress.  Head and Face -  Normocephalic and atraumatic, with no gross asymmetry noted of the contour of the facial features.  The facial nerve is intact, with strong symmetric movements.    Voice and Breathing - The patient was breathing comfortably without the use of accessory muscles. The patients voice was clear and strong, and had appropriate pitch and quality.    Ears - Bilateral pinna and EACs with normal appearing overlying skin. Tympanic membrane intact with good mobility on pneumatic otoscopy bilaterally. Bony landmarks of the ossicular chain are normal. The tympanic membranes are normal in appearance. No retraction, perforation, or masses.  No fluid or purulence was seen in the external canal or the middle ear.     Eyes - Extraocular movements intact.  Sclera were not icteric or injected, conjunctiva were pink and moist.    Mouth - Examination of the oral cavity showed pink, healthy oral mucosa. No lesions or ulcerations noted.  The tongue was mobile and midline, and the dentition were in good condition.      Throat - The walls of the oropharynx were smooth, pink, moist, symmetric, and had no lesions or ulcerations.  The tonsillar pillars and soft palate were symmetric. Tonsils are symmetric. The uvula was midline on elevation.    Neck - Normal midline excursion of the laryngotracheal complex during swallowing.  Full range of motion on passive movement.  Palpation of the occipital, submental, submandibular, internal jugular chain, and supraclavicular nodes did not demonstrate any abnormal lymph nodes or masses.  The carotid pulse was palpable bilaterally.  Palpation of the thyroid was soft and smooth, with no nodules or goiter appreciated.  The trachea was mobile and midline.    Nose - External contour is symmetric, no gross deflection or scars.  Nasal mucosa is pink and moist with no abnormal mucus.  The septum was deviated inferiorly to the right and superior to the left and partly obstructive, turbinates of enlarged size and  position.  No polyps, masses, or purulence noted on examination.    Neuro - Nonfocal neuro exam is normal, CN 2 through 12 intact, normal gait and muscle tone.      Performed in clinic today:  No procedures preformed in clinic today      A/P - Fredy Keys is a 44 year old male Patient presents with:  Sinusitis  RECHECK    We discussed the fact that his frontal sinus were quite clear nasofrontal recess is very open.  The incidental findings of right maxillary sinusitis especially with a narrow OM you without symptoms probably not the explanation of his symptoms of the larger scale.  Definite structural issues discussed with the nose that is septum and turbinates.  He is not interested in surgical option right now he will continue with sprays.  In regard to his frontal headaches will certainly advised him to potentially see neurologist for further evaluation.  He will think about it considering his more chronic process and does not involve anything else.  He will see me back in a couple months to see how he is doing and follow through with his nasal sinus issues.      Jeovany Pedersen MD        Again, thank you for allowing me to participate in the care of your patient.        Sincerely,        Jeovany Pedersen MD, MD

## 2020-01-13 ENCOUNTER — TELEPHONE (OUTPATIENT)
Dept: SURGERY | Facility: OTHER | Age: 45
End: 2020-01-13

## 2020-01-13 ENCOUNTER — OFFICE VISIT (OUTPATIENT)
Dept: SURGERY | Facility: CLINIC | Age: 45
End: 2020-01-13
Payer: COMMERCIAL

## 2020-01-13 VITALS
BODY MASS INDEX: 26.48 KG/M2 | WEIGHT: 185 LBS | SYSTOLIC BLOOD PRESSURE: 124 MMHG | HEIGHT: 70 IN | DIASTOLIC BLOOD PRESSURE: 62 MMHG

## 2020-01-13 DIAGNOSIS — K40.90 LEFT INGUINAL HERNIA: Primary | ICD-10-CM

## 2020-01-13 PROCEDURE — 99213 OFFICE O/P EST LOW 20 MIN: CPT | Performed by: SPECIALIST

## 2020-01-13 ASSESSMENT — MIFFLIN-ST. JEOR: SCORE: 1734.15

## 2020-01-13 NOTE — TELEPHONE ENCOUNTER
Met with patient in clinic. He needs to check his schedule and will call us back to schedule surgery

## 2020-01-13 NOTE — PROGRESS NOTES
F/U for left groin pain      Subjective:  Patient is a 44-year-old white male last seen by me in 2016 her left groin pain.  He did not have any hernias on exam at that time.  More recently he is noticed pain with coughing and sneezing which was not present previously he also has to hold pressure on his groin whenever it happens.  He is not sure if he feels a mass or fullness there.  Denies any nausea vomiting fevers chills or obstructive symptoms.    Objective:  B/P: 124/62, T: Data Unavailable, P: Data Unavailable, R: Data Unavailable  Abd: soft, non tender, non distended. Small tender LIH, reducible.    Assessment/plan:  This is a 44-year-old gentleman presenting with a longstanding history of left groin pain.  When he was initially seen he did not have any hernias on exam.  On today's exam he does appear to have a small inguinal hernia.  In addition, review of a CT scan done a year and after I seen him also reveals a small left fat-containing groin hernia.  I discussed these findings with the patient and he expressed understanding.  After discussion with the patient the plan at this time is for laparoscopic repair.  The procedure, risks, benefits alternatives were discussed and he agrees to proceed.  He will be scheduled near future.  He also understands is a 10 to 15% chance that repair of the hernia may not relieve his pain.    Trell Escobar MD, FACS

## 2020-01-13 NOTE — NURSING NOTE
[facility]    Fredy Keys has been given the following teaching information:  Before Your Surgery booklet  Jean Paul: Laproscopic Hernia Repair  Instructions for Showering or Bathing before Surgery

## 2020-01-13 NOTE — LETTER
1/13/2020         RE: Fredy Keys  [address] [facility]  [place] [address]        Dear Colleague,    Thank you for referring your patient, Fredy Keys, to the [facility]. Please see a copy of my visit note below.    F/U for left groin pain      Subjective:  Patient is a 44-year-old white male last seen by me in 2016 her left groin pain.  He did not have any hernias on exam at that time.  More recently he is noticed pain with coughing and sneezing which was not present previously he also has to hold pressure on his groin whenever it happens.  He is not sure if he feels a mass or fullness there.  Denies any nausea vomiting fevers chills or obstructive symptoms.    Objective:  B/P: 124/62, T: Data Unavailable, P: Data Unavailable, R: Data Unavailable  Abd: soft, non tender, non distended. Small tender LIH, reducible.    Assessment/plan:  This is a 44-year-old gentleman presenting with a longstanding history of left groin pain.  When he was initially seen he did not have any hernias on exam.  On today's exam he does appear to have a small inguinal hernia.  In addition, review of a CT scan done a year and after I seen him also reveals a small left fat-containing groin hernia.  I discussed these findings with the patient and he expressed understanding.  After discussion with the patient the plan at this time is for laparoscopic repair.  The procedure, risks, benefits alternatives were discussed and he agrees to proceed.  He will be scheduled near future.  He also understands is a 10 to 15% chance that repair of the hernia may not relieve his pain.    Trell Escobar MD, FACS    Again, thank you for allowing me to participate in the care of your patient.        Sincerely,        Trell Escobar MD

## 2020-02-03 ENCOUNTER — TELEPHONE (OUTPATIENT)
Dept: SURGERY | Facility: OTHER | Age: 45
End: 2020-02-03

## 2020-02-03 PROBLEM — K40.90 LEFT INGUINAL HERNIA: Status: ACTIVE | Noted: 2020-02-03

## 2020-02-03 NOTE — TELEPHONE ENCOUNTER
Type of surgery: Laparsocpic Left Inguinal Hernia Repair  Location of surgery: [facility]   Date of surgery: 3/3/20  Surgeon: Dr. Escobar  Pre-Op Appt Date: 2/24/20  Post-Op Appt Date: 3/9/20   Packet sent out: Surgery packet mailed to patient's home address.   Pre-cert/Authorization completed: NA  Date: 2/3/2020    Tiara Holden  Surgery Scheduler

## 2020-02-24 ENCOUNTER — OFFICE VISIT (OUTPATIENT)
Dept: FAMILY MEDICINE | Facility: CLINIC | Age: 45
End: 2020-02-24
Payer: COMMERCIAL

## 2020-02-24 VITALS
BODY MASS INDEX: 26.48 KG/M2 | HEIGHT: 70 IN | DIASTOLIC BLOOD PRESSURE: 86 MMHG | TEMPERATURE: 97.7 F | HEART RATE: 91 BPM | WEIGHT: 185 LBS | RESPIRATION RATE: 16 BRPM | SYSTOLIC BLOOD PRESSURE: 124 MMHG | OXYGEN SATURATION: 97 %

## 2020-02-24 DIAGNOSIS — Z01.818 PREOP GENERAL PHYSICAL EXAM: Primary | ICD-10-CM

## 2020-02-24 DIAGNOSIS — K21.00 GASTROESOPHAGEAL REFLUX DISEASE WITH ESOPHAGITIS: ICD-10-CM

## 2020-02-24 DIAGNOSIS — J30.1 CHRONIC SEASONAL ALLERGIC RHINITIS DUE TO POLLEN: ICD-10-CM

## 2020-02-24 DIAGNOSIS — K40.90 LEFT INGUINAL HERNIA: ICD-10-CM

## 2020-02-24 PROCEDURE — 99214 OFFICE O/P EST MOD 30 MIN: CPT | Performed by: FAMILY MEDICINE

## 2020-02-24 RX ORDER — AZELASTINE HYDROCHLORIDE 137 UG/1
137 SPRAY, METERED NASAL PRN
COMMUNITY
Start: 2020-01-13 | End: 2020-02-24

## 2020-02-24 RX ORDER — OMEPRAZOLE 40 MG/1
40 CAPSULE, DELAYED RELEASE ORAL DAILY
Qty: 30 CAPSULE | Refills: 4 | Status: SHIPPED | OUTPATIENT
Start: 2020-02-24 | End: 2020-06-12

## 2020-02-24 RX ORDER — MONTELUKAST SODIUM 10 MG/1
10 TABLET ORAL AT BEDTIME
Qty: 90 TABLET | Refills: 4 | Status: SHIPPED | OUTPATIENT
Start: 2020-02-24 | End: 2023-03-16

## 2020-02-24 ASSESSMENT — MIFFLIN-ST. JEOR: SCORE: 1734.15

## 2020-02-24 NOTE — PROGRESS NOTES
[address] [address]  [phone]  Dept: [phone]    PRE-OP EVALUATION:  Today's date: 2020    Fredy Keys (: 1975) presents for pre-operative evaluation assessment as requested by Dr. Peterson  He requires evaluation and anesthesia risk assessment prior to undergoing surgery/procedure for treatment of left inguinal hernia .    Fax number for surgical facility: [facility]  Primary Physician: Merrill Estrada  Type of Anesthesia Anticipated: General    Patient has a Health Care Directive or Living Will:  NO    Preop Questions 2020   Who is doing your surgery? ester   What are you having done? hernia pair   Date of Surgery/Procedure: 2020   Facility or Hospital where procedure/surgery will be performed: [facility]   1.  Do you have a history of Heart attack, stroke, stent, coronary bypass surgery, or other heart surgery? No   2.  Do you ever have any pain or discomfort in your chest? No   3.  Do you have a history of  Heart Failure? No   4.   Are you troubled by shortness of breath when:  walking on a level surface, or up a slight hill, or at night? No   5.  Do you currently have a cold, bronchitis or other respiratory infection? No   6.  Do you have a cough, shortness of breath, or wheezing? No   7.  Do you sometimes get pains in the calves of your legs when you walk? No   8. Do you or anyone in your family have previous history of blood clots? YES - Father, factor 5;  No personal history of VTE.   9.  Do you or does anyone in your family have a serious bleeding problem such as prolonged bleeding following surgeries or cuts? No   10. Have you ever had problems with anemia or been told to take iron pills? No   11. Have you had any abnormal blood loss such as black, tarry or bloody stools? No   12. Have you ever had a blood transfusion? No   13. Have you or any of your relatives ever had problems with anesthesia? No   14. Do you have sleep apnea,  excessive snoring or daytime drowsiness? No   15. Do you have any prosthetic heart valves? No   16. Do you have prosthetic joints? No         HPI:     HPI related to upcoming procedure: found to have left inguinal hernia.  Recommended to have repaired.    Having some issues with asthma-like symptoms.  History of allergic rhinitis.  Also having some GERD symptoms with some dysphagia on occasion with certain solid foods that are not chewed well.        See problem list for active medical problems.  Problems all longstanding and stable, except as noted/documented.  See ROS for pertinent symptoms related to these conditions.      MEDICAL HISTORY:     Patient Active Problem List    Diagnosis Date Noted     Left inguinal hernia 02/03/2020     Priority: Medium     Added automatically from request for surgery [id number]       Routine general medical examination at a health care facility 08/22/2019     Priority: Medium     Other acute pancreatitis, unspecified complication status 12/18/2017     Priority: Medium     Allergic conjunctivitis, bilateral 06/17/2011     Priority: Medium     CARDIOVASCULAR SCREENING; LDL GOAL LESS THAN 160 10/31/2010     Priority: Medium     Chronic seasonal allergic rhinitis due to pollen 08/24/2006     Priority: Medium      Past Medical History:   Diagnosis Date     ALLERGIC RHINITIS NOS 8/24/2006     Past Surgical History:   Procedure Laterality Date     ENT SURGERY       Current Outpatient Medications   Medication Sig Dispense Refill     azelastine (ASTELIN) 0.1 % nasal spray Spray 2 sprays into both nostrils 2 times daily 30 mL 3     ibuprofen (ADVIL/MOTRIN) 800 MG tablet Take 1 tablet (800 mg) by mouth every 8 hours as needed for moderate pain 90 tablet 1     montelukast (SINGULAIR) 10 MG tablet Take 1 tablet (10 mg) by mouth At Bedtime 90 tablet 4     olopatadine (PATADAY) 0.2 % ophthalmic solution Apply 1 drop to eye daily 1 Bottle 11     omeprazole (PRILOSEC) 40 MG DR capsule Take 1 capsule  "(40 mg) by mouth daily 30 capsule 4     ZYRTEC-D 5-120 MG OR TB12 ONE TABLET TWICE DAILY 3 MONTHS 1 YEAR     fluticasone (FLONASE) 50 MCG/ACT nasal spray Spray 1-2 sprays into both nostrils daily (Patient not taking: Reported on 2/24/2020) 42 g 3     OTC products: NSAIDS    Allergies   Allergen Reactions     Zithromax [Azithromycin Dihydrate] Hives     Seasonal Allergies       Latex Allergy: NO    Social History     Tobacco Use     Smoking status: Never Smoker     Smokeless tobacco: Former User     Types: Chew     Tobacco comment: no smoking in the home.  1 tin/week in summer, 1 every other month in winter   Substance Use Topics     Alcohol use: Yes     Alcohol/week: 0.0 standard drinks     Comment: 4-5  drinks weekly     History   Drug Use No       REVIEW OF SYSTEMS:   Constitutional, neuro, ENT, endocrine, pulmonary, cardiac, gastrointestinal, genitourinary, musculoskeletal, integument and psychiatric systems are negative, except as otherwise noted.    EXAM:   /86 (BP Location: Right arm, Patient Position: Sitting, Cuff Size: Adult Large)   Pulse 91   Temp 97.7  F (36.5  C) (Temporal)   Resp 16   Ht 1.784 m (5' 10.24\")   Wt 83.9 kg (185 lb)   SpO2 97%   BMI 26.37 kg/m      GENERAL APPEARANCE: healthy, alert and no distress     EYES: EOMI,  PERRL     HENT: ear canals and TM's normal and nose and mouth without ulcers or lesions     NECK: no adenopathy, no asymmetry, masses, or scars and thyroid normal to palpation     RESP: lungs clear to auscultation - no rales, rhonchi or wheezes     CV: regular rates and rhythm, normal S1 S2, no S3 or S4 and no murmur, click or rub     ABDOMEN:  soft, nontender, no HSM or masses and bowel sounds normal     MS: extremities normal- no gross deformities noted, no evidence of inflammation in joints, FROM in all extremities.     SKIN: no suspicious lesions or rashes     NEURO: Normal strength and tone, sensory exam grossly normal, mentation intact and speech normal     " PSYCH: mentation appears normal. and affect normal/bright     LYMPHATICS: No cervical adenopathy    DIAGNOSTICS:   EKG: Not indicated due to non-vascular surgery and low risk of event (age <65 and without cardiac risk factors)    Recent Labs   Lab Test 08/22/19  1635 05/09/18  1559  12/18/17  1233   HGB  --  16.4  --  16.8   PLT  --  287  --  278    140   < > 138   POTASSIUM 3.9 4.1   < > 4.3   CR 1.27* 1.30*   < > 1.35*    < > = values in this interval not displayed.        IMPRESSION:   Reason for surgery/procedure:    Preop general physical exam  Left inguinal hernia    Diagnosis/reason for consult:   Chronic seasonal allergic rhinitis due to pollen  Gastroesophageal reflux disease with esophagitis      The proposed surgical procedure is considered INTERMEDIATE risk.    REVISED CARDIAC RISK INDEX  The patient has the following serious cardiovascular risks for perioperative complications such as (MI, PE, VFib and 3  AV Block):  No serious cardiac risks  INTERPRETATION: 0 risks: Class I (very low risk - 0.4% complication rate)    The patient has the following additional risks for perioperative complications:  No identified additional risks      ICD-10-CM    1. Preop general physical exam Z01.818    2. Left inguinal hernia K40.90    3. Chronic seasonal allergic rhinitis due to pollen J30.1 montelukast (SINGULAIR) 10 MG tablet   4. Gastroesophageal reflux disease with esophagitis K21.0 omeprazole (PRILOSEC) 40 MG DR capsule       RECOMMENDATIONS:       --Patient is to take all scheduled medications on the day of surgery EXCEPT for modifications listed below.    Anticoagulant or Antiplatelet Medication Use  NSAIDS: Ibuprofen (Motrin):         Stop one day prior to surgery      PPI started for his swallowing issues that are likely associated with GERD.  He will contact us if not effective.  If swallowing not better after 2 months or worsens, then he will need EGD.    Singulair started for when allergies become  severe this spring.    APPROVAL GIVEN to proceed with proposed procedure, without further diagnostic evaluation       Signed Electronically by: Shree Cee MD    Copy of this evaluation report is provided to requesting physician.    [place] Preop Guidelines    Revised Cardiac Risk Index

## 2020-03-03 ENCOUNTER — HOSPITAL ENCOUNTER (OUTPATIENT)
Facility: CLINIC | Age: 45
Discharge: HOME | End: 2020-03-03
Attending: SPECIALIST | Admitting: SPECIALIST
Payer: COMMERCIAL

## 2020-03-03 ENCOUNTER — ANESTHESIA (OUTPATIENT)
Dept: SURGERY | Facility: CLINIC | Age: 45
End: 2020-03-03
Payer: COMMERCIAL

## 2020-03-03 ENCOUNTER — ANESTHESIA EVENT (OUTPATIENT)
Dept: SURGERY | Facility: CLINIC | Age: 45
End: 2020-03-03
Payer: COMMERCIAL

## 2020-03-03 VITALS
HEART RATE: 78 BPM | RESPIRATION RATE: 16 BRPM | TEMPERATURE: 97.3 F | BODY MASS INDEX: 26.37 KG/M2 | WEIGHT: 185 LBS | DIASTOLIC BLOOD PRESSURE: 72 MMHG | OXYGEN SATURATION: 94 % | SYSTOLIC BLOOD PRESSURE: 109 MMHG

## 2020-03-03 DIAGNOSIS — K40.90 LEFT INGUINAL HERNIA: ICD-10-CM

## 2020-03-03 DIAGNOSIS — G89.18 POST-OP PAIN: Primary | ICD-10-CM

## 2020-03-03 PROCEDURE — 36000058 ZZH SURGERY LEVEL 3 EA 15 ADDTL MIN: Performed by: SPECIALIST

## 2020-03-03 PROCEDURE — 25000566 ZZH SEVOFLURANE, EA 15 MIN: Performed by: SPECIALIST

## 2020-03-03 PROCEDURE — 36000056 ZZH SURGERY LEVEL 3 1ST 30 MIN: Performed by: SPECIALIST

## 2020-03-03 PROCEDURE — 37000008 ZZH ANESTHESIA TECHNICAL FEE, 1ST 30 MIN: Performed by: SPECIALIST

## 2020-03-03 PROCEDURE — 25000125 ZZHC RX 250: Performed by: NURSE ANESTHETIST, CERTIFIED REGISTERED

## 2020-03-03 PROCEDURE — 25000128 H RX IP 250 OP 636: Performed by: NURSE ANESTHETIST, CERTIFIED REGISTERED

## 2020-03-03 PROCEDURE — 25800030 ZZH RX IP 258 OP 636: Performed by: NURSE ANESTHETIST, CERTIFIED REGISTERED

## 2020-03-03 PROCEDURE — 71000027 ZZH RECOVERY PHASE 2 EACH 15 MINS: Performed by: SPECIALIST

## 2020-03-03 PROCEDURE — 71000014 ZZH RECOVERY PHASE 1 LEVEL 2 FIRST HR: Performed by: SPECIALIST

## 2020-03-03 PROCEDURE — 25000128 H RX IP 250 OP 636: Performed by: SPECIALIST

## 2020-03-03 PROCEDURE — 37000009 ZZH ANESTHESIA TECHNICAL FEE, EACH ADDTL 15 MIN: Performed by: SPECIALIST

## 2020-03-03 PROCEDURE — 49650 LAP ING HERNIA REPAIR INIT: CPT | Mod: LT | Performed by: SPECIALIST

## 2020-03-03 PROCEDURE — 25000132 ZZH RX MED GY IP 250 OP 250 PS 637: Performed by: SPECIALIST

## 2020-03-03 PROCEDURE — 25000132 ZZH RX MED GY IP 250 OP 250 PS 637: Performed by: NURSE ANESTHETIST, CERTIFIED REGISTERED

## 2020-03-03 PROCEDURE — 27211024 ZZHC OR SUPPLY OTHER OPNP: Performed by: SPECIALIST

## 2020-03-03 PROCEDURE — 25000564 ZZH DESFLURANE, EA 15 MIN: Performed by: SPECIALIST

## 2020-03-03 PROCEDURE — C1727 CATH, BAL TIS DIS, NON-VAS: HCPCS | Performed by: SPECIALIST

## 2020-03-03 PROCEDURE — C1781 MESH (IMPLANTABLE): HCPCS | Performed by: SPECIALIST

## 2020-03-03 PROCEDURE — 25000125 ZZHC RX 250: Performed by: SPECIALIST

## 2020-03-03 PROCEDURE — 27110028 ZZH OR GENERAL SUPPLY NON-STERILE: Performed by: SPECIALIST

## 2020-03-03 PROCEDURE — 40000306 ZZH STATISTIC PRE PROC ASSESS II: Performed by: SPECIALIST

## 2020-03-03 PROCEDURE — 27210794 ZZH OR GENERAL SUPPLY STERILE: Performed by: SPECIALIST

## 2020-03-03 DEVICE — MESH 3DMAX LEFT LG 0115311: Type: IMPLANTABLE DEVICE | Site: GROIN | Status: FUNCTIONAL

## 2020-03-03 DEVICE — IMPLANTABLE DEVICE: Type: IMPLANTABLE DEVICE | Site: GROIN | Status: FUNCTIONAL

## 2020-03-03 RX ORDER — SODIUM CHLORIDE, SODIUM LACTATE, POTASSIUM CHLORIDE, CALCIUM CHLORIDE 600; 310; 30; 20 MG/100ML; MG/100ML; MG/100ML; MG/100ML
INJECTION, SOLUTION INTRAVENOUS CONTINUOUS
Status: DISCONTINUED | OUTPATIENT
Start: 2020-03-03 | End: 2020-03-03 | Stop reason: HOSPADM

## 2020-03-03 RX ORDER — ONDANSETRON 2 MG/ML
INJECTION INTRAMUSCULAR; INTRAVENOUS PRN
Status: DISCONTINUED | OUTPATIENT
Start: 2020-03-03 | End: 2020-03-03

## 2020-03-03 RX ORDER — BUPIVACAINE HYDROCHLORIDE AND EPINEPHRINE 2.5; 5 MG/ML; UG/ML
INJECTION, SOLUTION INFILTRATION; PERINEURAL PRN
Status: DISCONTINUED | OUTPATIENT
Start: 2020-03-03 | End: 2020-03-03 | Stop reason: HOSPADM

## 2020-03-03 RX ORDER — PROPOFOL 10 MG/ML
INJECTION, EMULSION INTRAVENOUS CONTINUOUS PRN
Status: DISCONTINUED | OUTPATIENT
Start: 2020-03-03 | End: 2020-03-03

## 2020-03-03 RX ORDER — FENTANYL CITRATE 50 UG/ML
INJECTION, SOLUTION INTRAMUSCULAR; INTRAVENOUS PRN
Status: DISCONTINUED | OUTPATIENT
Start: 2020-03-03 | End: 2020-03-03

## 2020-03-03 RX ORDER — OXYCODONE AND ACETAMINOPHEN 5; 325 MG/1; MG/1
1-2 TABLET ORAL
Status: COMPLETED | OUTPATIENT
Start: 2020-03-03 | End: 2020-03-03

## 2020-03-03 RX ORDER — ACETAMINOPHEN 325 MG/1
975 TABLET ORAL ONCE
Status: COMPLETED | OUTPATIENT
Start: 2020-03-03 | End: 2020-03-03

## 2020-03-03 RX ORDER — OXYCODONE AND ACETAMINOPHEN 5; 325 MG/1; MG/1
1-2 TABLET ORAL EVERY 6 HOURS PRN
Qty: 10 TABLET | Refills: 0 | Status: SHIPPED | OUTPATIENT
Start: 2020-03-03 | End: 2020-03-09

## 2020-03-03 RX ORDER — DIPHENHYDRAMINE HYDROCHLORIDE 50 MG/ML
INJECTION INTRAMUSCULAR; INTRAVENOUS PRN
Status: DISCONTINUED | OUTPATIENT
Start: 2020-03-03 | End: 2020-03-03

## 2020-03-03 RX ORDER — OXYCODONE HYDROCHLORIDE 5 MG/1
5 TABLET ORAL EVERY 4 HOURS PRN
Status: CANCELLED | OUTPATIENT
Start: 2020-03-03

## 2020-03-03 RX ORDER — DIMENHYDRINATE 50 MG/ML
25 INJECTION, SOLUTION INTRAMUSCULAR; INTRAVENOUS
Status: DISCONTINUED | OUTPATIENT
Start: 2020-03-03 | End: 2020-03-03 | Stop reason: HOSPADM

## 2020-03-03 RX ORDER — CEFAZOLIN SODIUM 2 G/100ML
2 INJECTION, SOLUTION INTRAVENOUS
Status: COMPLETED | OUTPATIENT
Start: 2020-03-03 | End: 2020-03-03

## 2020-03-03 RX ORDER — METOPROLOL TARTRATE 1 MG/ML
1-2 INJECTION, SOLUTION INTRAVENOUS EVERY 5 MIN PRN
Status: DISCONTINUED | OUTPATIENT
Start: 2020-03-03 | End: 2020-03-03 | Stop reason: HOSPADM

## 2020-03-03 RX ORDER — NALOXONE HYDROCHLORIDE 0.4 MG/ML
.1-.4 INJECTION, SOLUTION INTRAMUSCULAR; INTRAVENOUS; SUBCUTANEOUS
Status: DISCONTINUED | OUTPATIENT
Start: 2020-03-03 | End: 2020-03-03 | Stop reason: HOSPADM

## 2020-03-03 RX ORDER — ESMOLOL HYDROCHLORIDE 10 MG/ML
INJECTION INTRAVENOUS PRN
Status: DISCONTINUED | OUTPATIENT
Start: 2020-03-03 | End: 2020-03-03

## 2020-03-03 RX ORDER — ONDANSETRON 2 MG/ML
4 INJECTION INTRAMUSCULAR; INTRAVENOUS EVERY 30 MIN PRN
Status: DISCONTINUED | OUTPATIENT
Start: 2020-03-03 | End: 2020-03-03 | Stop reason: HOSPADM

## 2020-03-03 RX ORDER — ONDANSETRON 4 MG/1
4 TABLET, ORALLY DISINTEGRATING ORAL EVERY 30 MIN PRN
Status: DISCONTINUED | OUTPATIENT
Start: 2020-03-03 | End: 2020-03-03 | Stop reason: HOSPADM

## 2020-03-03 RX ORDER — FENTANYL CITRATE 50 UG/ML
25-50 INJECTION, SOLUTION INTRAMUSCULAR; INTRAVENOUS
Status: DISCONTINUED | OUTPATIENT
Start: 2020-03-03 | End: 2020-03-03 | Stop reason: HOSPADM

## 2020-03-03 RX ORDER — LABETALOL HYDROCHLORIDE 5 MG/ML
INJECTION, SOLUTION INTRAVENOUS PRN
Status: DISCONTINUED | OUTPATIENT
Start: 2020-03-03 | End: 2020-03-03

## 2020-03-03 RX ORDER — OXYCODONE AND ACETAMINOPHEN 5; 325 MG/1; MG/1
1 TABLET ORAL
Status: DISCONTINUED | OUTPATIENT
Start: 2020-03-03 | End: 2020-03-03

## 2020-03-03 RX ORDER — HYDROMORPHONE HYDROCHLORIDE 1 MG/ML
.3-.5 INJECTION, SOLUTION INTRAMUSCULAR; INTRAVENOUS; SUBCUTANEOUS EVERY 10 MIN PRN
Status: DISCONTINUED | OUTPATIENT
Start: 2020-03-03 | End: 2020-03-03 | Stop reason: HOSPADM

## 2020-03-03 RX ORDER — CEFAZOLIN SODIUM 1 G/3ML
1 INJECTION, POWDER, FOR SOLUTION INTRAMUSCULAR; INTRAVENOUS SEE ADMIN INSTRUCTIONS
Status: DISCONTINUED | OUTPATIENT
Start: 2020-03-03 | End: 2020-03-03 | Stop reason: HOSPADM

## 2020-03-03 RX ORDER — PROPOFOL 10 MG/ML
INJECTION, EMULSION INTRAVENOUS PRN
Status: DISCONTINUED | OUTPATIENT
Start: 2020-03-03 | End: 2020-03-03

## 2020-03-03 RX ORDER — DEXAMETHASONE SODIUM PHOSPHATE 10 MG/ML
INJECTION, SOLUTION INTRAMUSCULAR; INTRAVENOUS PRN
Status: DISCONTINUED | OUTPATIENT
Start: 2020-03-03 | End: 2020-03-03

## 2020-03-03 RX ORDER — LIDOCAINE HYDROCHLORIDE 20 MG/ML
INJECTION, SOLUTION INFILTRATION; PERINEURAL PRN
Status: DISCONTINUED | OUTPATIENT
Start: 2020-03-03 | End: 2020-03-03

## 2020-03-03 RX ORDER — GABAPENTIN 300 MG/1
300 CAPSULE ORAL ONCE
Status: COMPLETED | OUTPATIENT
Start: 2020-03-03 | End: 2020-03-03

## 2020-03-03 RX ADMIN — LABETALOL HYDROCHLORIDE 15 MG: 5 INJECTION INTRAVENOUS at 13:28

## 2020-03-03 RX ADMIN — HYDROMORPHONE HYDROCHLORIDE 0.5 MG: 1 INJECTION, SOLUTION INTRAMUSCULAR; INTRAVENOUS; SUBCUTANEOUS at 14:38

## 2020-03-03 RX ADMIN — LIDOCAINE HYDROCHLORIDE 1 ML: 10 INJECTION, SOLUTION EPIDURAL; INFILTRATION; INTRACAUDAL; PERINEURAL at 12:44

## 2020-03-03 RX ADMIN — LIDOCAINE HYDROCHLORIDE 100 MG: 20 INJECTION, SOLUTION INFILTRATION; PERINEURAL at 13:03

## 2020-03-03 RX ADMIN — ESMOLOL HYDROCHLORIDE 30 MG: 10 INJECTION, SOLUTION INTRAVENOUS at 13:16

## 2020-03-03 RX ADMIN — ROCURONIUM BROMIDE 50 MG: 10 INJECTION INTRAVENOUS at 13:04

## 2020-03-03 RX ADMIN — ONDANSETRON 4 MG: 2 INJECTION INTRAMUSCULAR; INTRAVENOUS at 13:22

## 2020-03-03 RX ADMIN — DIPHENHYDRAMINE HYDROCHLORIDE 25 MG: 50 INJECTION, SOLUTION INTRAMUSCULAR; INTRAVENOUS at 13:19

## 2020-03-03 RX ADMIN — DEXAMETHASONE SODIUM PHOSPHATE 10 MG: 10 INJECTION, SOLUTION INTRAMUSCULAR; INTRAVENOUS at 13:16

## 2020-03-03 RX ADMIN — SODIUM CHLORIDE, POTASSIUM CHLORIDE, SODIUM LACTATE AND CALCIUM CHLORIDE: 600; 310; 30; 20 INJECTION, SOLUTION INTRAVENOUS at 13:50

## 2020-03-03 RX ADMIN — ACETAMINOPHEN 975 MG: 325 TABLET, FILM COATED ORAL at 12:47

## 2020-03-03 RX ADMIN — CEFAZOLIN SODIUM 2 G: 2 INJECTION, SOLUTION INTRAVENOUS at 13:09

## 2020-03-03 RX ADMIN — SUGAMMADEX 200 MG: 100 INJECTION, SOLUTION INTRAVENOUS at 13:55

## 2020-03-03 RX ADMIN — PROPOFOL 100 MCG/KG/MIN: 10 INJECTION, EMULSION INTRAVENOUS at 13:14

## 2020-03-03 RX ADMIN — GABAPENTIN 300 MG: 300 CAPSULE ORAL at 12:47

## 2020-03-03 RX ADMIN — FENTANYL CITRATE 50 MCG: 50 INJECTION, SOLUTION INTRAMUSCULAR; INTRAVENOUS at 13:03

## 2020-03-03 RX ADMIN — FENTANYL CITRATE 50 MCG: 50 INJECTION, SOLUTION INTRAMUSCULAR; INTRAVENOUS at 13:16

## 2020-03-03 RX ADMIN — PROPOFOL 200 MG: 10 INJECTION, EMULSION INTRAVENOUS at 13:03

## 2020-03-03 RX ADMIN — ESMOLOL HYDROCHLORIDE 20 MG: 10 INJECTION, SOLUTION INTRAVENOUS at 13:20

## 2020-03-03 RX ADMIN — SODIUM CHLORIDE, POTASSIUM CHLORIDE, SODIUM LACTATE AND CALCIUM CHLORIDE: 600; 310; 30; 20 INJECTION, SOLUTION INTRAVENOUS at 12:44

## 2020-03-03 RX ADMIN — HYDROMORPHONE HYDROCHLORIDE 0.5 MG: 1 INJECTION, SOLUTION INTRAMUSCULAR; INTRAVENOUS; SUBCUTANEOUS at 13:45

## 2020-03-03 RX ADMIN — OXYCODONE HYDROCHLORIDE AND ACETAMINOPHEN 1 TABLET: 5; 325 TABLET ORAL at 15:17

## 2020-03-03 RX ADMIN — MIDAZOLAM 2 MG: 1 INJECTION INTRAMUSCULAR; INTRAVENOUS at 12:55

## 2020-03-03 ASSESSMENT — LIFESTYLE VARIABLES: TOBACCO_USE: 1

## 2020-03-03 NOTE — ANESTHESIA PREPROCEDURE EVALUATION
Anesthesia Pre-Procedure Evaluation    Patient: Fredy Keys   MRN: [id number] : 1975          Preoperative Diagnosis: Left inguinal hernia [K40.90]    Procedure(s):  Laparoscopic left inguinal hernia repair    Past Medical History:   Diagnosis Date     ALLERGIC RHINITIS NOS 2006     Past Surgical History:   Procedure Laterality Date     ENT SURGERY         Anesthesia Evaluation     . Pt has had prior anesthetic. Type: General    No history of anesthetic complications          ROS/MED HX    ENT/Pulmonary:     (+)allergic rhinitis, tobacco use (smokeless tobacco - 1 tin/week in summer, 1 every other month in winter), Current use , . .    Neurologic:  - neg neurologic ROS     Cardiovascular:  - neg cardiovascular ROS   (+) ----. : . . . :. . Previous cardiac testing date:results:date: results:ECG reviewed date:17 results:Sinus Rhythm   WITHIN NORMAL LIMITS date: results:          METS/Exercise Tolerance:  >4 METS   Hematologic:  - neg hematologic  ROS       Musculoskeletal:  - neg musculoskeletal ROS       GI/Hepatic:     (+) GERD Asymptomatic on medication, Other GI/Hepatic hx of acute pancreatitis      Renal/Genitourinary:  - ROS Renal section negative       Endo:  - neg endo ROS       Psychiatric:  - neg psychiatric ROS       Infectious Disease:  - neg infectious disease ROS       Malignancy:      - no malignancy   Other:    - neg other ROS                      Physical Exam  Normal systems: cardiovascular, pulmonary and dental    Airway   Mallampati: II  TM distance: >3 FB  Neck ROM: full    Dental     Cardiovascular   Rhythm and rate: regular and normal      Pulmonary    breath sounds clear to auscultation            Lab Results   Component Value Date    WBC 11.0 2018    HGB 16.4 2018    HCT 47.7 2018     2018    CRP <5.0 2014    SED 2 2014     2019    POTASSIUM 3.9 2019    CHLORIDE 106 2019    CO2 29 2019    BUN 23  "08/22/2019    CR 1.27 (H) 08/22/2019    GLC 86 08/22/2019    VICKIE 9.0 08/22/2019    ALBUMIN 4.0 08/22/2019    PROTTOTAL 7.3 08/22/2019    ALT 29 08/22/2019    AST 25 08/22/2019    ALKPHOS 70 08/22/2019    BILITOTAL 0.6 08/22/2019    LIPASE 198 08/22/2019    AMYLASE 64 12/18/2017       Preop Vitals  BP Readings from Last 3 Encounters:   02/24/20 124/86   01/13/20 124/62   11/04/19 126/86    Pulse Readings from Last 3 Encounters:   02/24/20 91   08/22/19 85   05/11/18 85      Resp Readings from Last 3 Encounters:   02/24/20 16   08/22/19 16   12/16/17 19    SpO2 Readings from Last 3 Encounters:   02/24/20 97%   08/22/19 98%   05/11/18 98%      Temp Readings from Last 1 Encounters:   02/24/20 97.7  F (36.5  C) (Temporal)    Ht Readings from Last 1 Encounters:   02/24/20 1.784 m (5' 10.24\")      Wt Readings from Last 1 Encounters:   02/24/20 83.9 kg (185 lb)    Estimated body mass index is 26.37 kg/m  as calculated from the following:    Height as of 2/24/20: 1.784 m (5' 10.24\").    Weight as of 2/24/20: 83.9 kg (185 lb).       Anesthesia Plan      History & Physical Review  History and physical reviewed and following examination; no interval change.    ASA Status:  2 .    NPO Status:  > 8 hours    Plan for General and ETT with Intravenous and Propofol induction. Maintenance will be Inhalation and Balanced.    PONV prophylaxis:  Ondansetron (or other 5HT-3) and Dexamethasone or Solumedrol       Postoperative Care  Postoperative pain management:  IV analgesics, Multi-modal analgesia and Oral pain medications.      Consents  Anesthetic plan, risks, benefits and alternatives discussed with:  Patient.  Use of blood products discussed: No .   .                 ELIE Velazquez CRNA  "

## 2020-03-03 NOTE — BRIEF OP NOTE
[facility] Brief Operative Note    Pre-operative diagnosis: Left inguinal hernia [K40.90]   Post-operative diagnosis Same, Large lipoma of the cord   Procedure: Procedure(s):  Laparoscopic left inguinal hernia repair   Surgeon(s): Surgeon(s) and Role:     * Trell Escobar MD - Primary   Estimated blood loss: 2 mL    Specimens: * No specimens in log *   Findings: Large lipoma of the cord.       Trell Escobar MD, FACS      #[id number]

## 2020-03-03 NOTE — OP NOTE
Procedure Date: 03/03/2020      PREOPERATIVE DIAGNOSIS:  Left inguinal hernia.      POSTOPERATIVE DIAGNOSES:  Left inguinal hernia, large lipoma of the cord.      PROCEDURE:  Laparoscopic preperitoneal left inguinal hernia repair with Bard 3DMax mesh.      SURGEON:  Trell Escobar MD, [facility]      ANESTHESIA:  General endotracheal.      INDICATIONS FOR PROCEDURE:  This is a 44-year-old gentleman who presented with a left groin bulge.  On exam, he had a hernia and for this reason, it was elected to take him to the operating room for repair.      OPERATIVE FINDINGS:  A large lipoma of the cord.      DETAILS OF PROCEDURE:  With cooperation of the patient in the preoperative holding, the left groin was marked.  He was taken to the operating room after induction of general anesthesia.  The abdomen was prepped and draped in sterile fashion.  A timeout was performed confirming the identity of the patient as well as the procedure to be performed.  A supraumbilical incision was then made.  Subcutaneous tissue was opened using cautery.  The right rectus sheath was opened using an 11 blade.  The right rectus muscle was retracted laterally.  A balloon dissector was inserted into the preperitoneal space and inflated 26 times by direct count and under direct visualization.  The balloon was left inflated for 2 minutes by the clock, it was then removed and a Maribell trocar was inserted.  The preperitoneal space was insufflated.  A 5 mm trocar was placed below this.  The loose areolar tissue over the pubic bone was taken down using a Kitner dissector and a round nose dissector.  We then continued to dissect laterally on the left side.  There was no direct space defect noted.  We then dissected further lateral and identified the cord.  The cord was then skeletonized, reducing a large lipoma.  After this was reduced, the remainder of the cord was inspected.  There was no evidence of any indirect sac.  A piece of Bard 3DMax mesh was then  placed into the preperitoneal space, tacked medially to the pubic tubercle and to the anterior abdominal wall.  The large lipoma was then placed over the mesh and the preperitoneal space was deflated.  All trocars were removed without evidence of any bleeding.  The fascia was then closed using a figure-of-eight 0 Vicryl.  All skin incisions were closed using 3-0 Vicryl, 4-0 Monocryl and Exofin glue.  The patient was then taken from the operating room to the recovery room in stable condition to be sent home.         MARTA MCCOY MD, FACS            D: 2020   T: 2020   MT: LISBETH      Name:     SOFIA THAYER   MRN:      [id number]        Account:        [id number]   :      1975           Procedure Date: 2020      Document: [id number]

## 2020-03-03 NOTE — ANESTHESIA POSTPROCEDURE EVALUATION
Patient: Fredy Keys    Procedure(s):  Laparoscopic left inguinal hernia repair    Diagnosis:Left inguinal hernia [K40.90]  Diagnosis Additional Information: No value filed.    Anesthesia Type:  General, ETT    Note:  Anesthesia Post Evaluation    Patient location during evaluation: Phase 2  Patient participation: Able to fully participate in evaluation  Level of consciousness: awake and alert  Pain management: adequate  Airway patency: patent  Cardiovascular status: acceptable and stable  Respiratory status: acceptable and room air  Hydration status: acceptable  PONV: none     Anesthetic complications: None    Comments:  Patient was happy with the anesthesia care received and no anesthesia related complications were noted.  I will follow up with the patient again if it is needed.        Last vitals:  Vitals:    03/03/20 1430 03/03/20 1435 03/03/20 1440   BP: 118/72 114/75    Pulse: 72 71    Resp: 16 18 18   Temp: 97.3  F (36.3  C)     SpO2: 99% 99%          Electronically Signed By: ELIE Velazquez CRNA  March 3, 2020  2:56 PM

## 2020-03-03 NOTE — ANESTHESIA CARE TRANSFER NOTE
Patient: Fredy Keys    Procedure(s):  Laparoscopic left inguinal hernia repair    Diagnosis: Left inguinal hernia [K40.90]  Diagnosis Additional Information: No value filed.    Anesthesia Type:   General, ETT     Note:  Airway :Face Mask  Patient transferred to:PACU  Handoff Report: Identifed the Patient, Identified the Reponsible Provider, Reviewed the pertinent medical history, Discussed the surgical course, Reviewed Intra-OP anesthesia mangement and issues during anesthesia, Set expectations for post-procedure period and Allowed opportunity for questions and acknowledgement of understanding      Vitals: (Last set prior to Anesthesia Care Transfer)    CRNA VITALS  3/3/2020 1338 - 3/3/2020 1430      3/3/2020             Pulse:  82    SpO2:  94 %                Electronically Signed By: ELIE Velazquez CRNA  March 3, 2020  2:30 PM

## 2020-03-03 NOTE — DISCHARGE INSTRUCTIONS
[facility]    Home Care Following Hernia Repair      Hernia Type:  Inguinal    Care of the Incision:    Remove gauze dressing (if present) after 48 hours.    If surgical glue was used, keep your incision dry for 24 hours.  Then you may shower, but don t submerge under water for at least 2 weeks.  Gently pat your incision dry with a freshly laundered towel.    Do not touch your incision with bare hands or pick at scabs.    Leave your incision open to air.  Cover it only if clothing rubs or irritates it.  Activity:    Gradually increase your activity.  Walk short distances several times each day and increase the distance as your strength allows.    To promote circulation, do not cross your legs while sitting.    No strenuous lifting or straining for 2-3 weeks.   Do not lift anything over 10-20 pounds until your doctor approves an increase.    Return to work will be determined by the type of work you do and should be discussed with your physician.    Do not drive or operate equipment while taking prescription pain medicines.  You may drive 1 week after surgery if you have stopped taking prescription pain medicines and are pain-free enough to react quickly and make an emergency stop if necessary.    Diet:    Return to the diet you were on before surgery.    Drink plenty of  water.    Avoid foods that cause constipation.      REMEMBER--most prescription pain pills cause constipation.  Walking, extra fluids, and increased fiber (fresh fruits and vegetables, etc.) are natural remedies for constipation.  You can also take mineral oil, 1-2 Tablespoons per day.  If still constipated you may try a stool softener such as Colace or Miralax.    Call Your Physician if You Have:    Redness, increased swelling or cloudy drainage from your incision.    A temperature of more than 101 degrees F.    Worsening pain in your incision not relieved by your prescription pain pills and/or a short rest.    Any questions or  concerns about your recovery, please call Business hours [phone]    After hours [phone] Nurse Advice Line (24 hours a day)    Follow-up Care:    Make an appointment 2-3 weeks after your surgery.  Call [phone]    [facility] Same-Day [facility]   Adult Discharge Orders & Instructions     For 24 hours after surgery    1. Get plenty of rest.  A responsible adult must stay with you for at least 24 hours after you leave the hospital.   2. Do not drive or use heavy equipment.  If you have weakness or tingling, don't drive or use heavy equipment until this feeling goes away.  3. Do not drink alcohol.  4. Avoid strenuous or risky activities.  Ask for help when climbing stairs.   5. You may feel lightheaded.  If so, sit for a few minutes before standing.  Have someone help you get up.   6. You may have a slight fever. Call the doctor if your fever is over 100 F (37.7 C) (taken under the tongue) or lasts longer than 24 hours.  7. You may have a dry mouth, a sore throat, muscle aches or trouble sleeping.  These should go away after 24 hours.  8. Do not make important or legal decisions.  We don t expect you to have any problems from the surgery or treatment you had today. Just in case, here s what to do if you have pain, upset stomach (nausea), bleeding or infection:  Pain:  Take medicines your doctor has prescribed or over-the-counter medicine they have suggested. Resting and using ice packs can help, too. For surgery on an arm or leg, raise it on a pillow to ease swelling. Call your doctor if these methods don t work.  Copyright Varun Broussard, Licensed under [employer]  Upset stomach (nausea):  Take anti-nausea medicine approved by your doctor. Drink clear liquids like apple juice, ginger ale, broth or 7-Up. Be sure to drink enough fluids. Rest can help, too. Move to normal foods when you re ready.   Bleeding:  In the first 24 hours, you may see a little blood on your dressing, about the  size of a quarter. You don t need to worry about this much blood, but if the blood spot keeps getting bigger:    Put pressure on the wound if you can, AND    Call your doctor.  Copyright fishfishme, Licensed under [employer]  Fever/Infection: Please call your doctor if you have any of these signs:    Redness    Swelling    Wound feels warm    Pain gets worse    Bad-smelling fluid leaks from wound    Fever or chills  Call your doctor for any of the followin.  It has been over 8 to 10 hours since surgery and you are still not able to urinate (pass water).    Nurse advice line: [phone] (24 hour)

## 2020-03-04 ENCOUNTER — NURSE TRIAGE (OUTPATIENT)
Dept: NURSING | Facility: CLINIC | Age: 45
End: 2020-03-04

## 2020-03-04 NOTE — OR NURSING
"[facility] Same Day Surgery  Discharge Call Back  Fredy Keys  1975  MRN: [id number]  Home: [phone] (home)   PCP: Merrill Estrada    We are calling to see how you're doing since your surgery/procedure with us?   Comments: \"I think I am doing OK.  I am up moving around, a little sore.\"  Clinical Questions  1. Have you had time to look at your discharge instructions? Do you have any questions in regards to the instructions?   Comment: No, but I suppose it would be smart to read them.\"  Reinforced reading discharge instructions.  2. Do you feel your pain is being controlled with the regimen the surgeon sent you home on? (ie: prescription medications, over the counter pain medications, ice packs)   Comments: \"I last took some thing around 5am this morning, so I think the pain is managed.\"  Discussed using Ibuprofen as adjunt to the narcotic pain medication.  3. Have you noticed any drainage on your dressing? Do you know what to do if you have bleeding as a result of your procedure?   Comments: \"no\"  4. Have you had any nausea/vomiting? Do you know how to treat this?   Comment: \"no\"  5. Have you had any signs/symptoms of infection? (ie: fever, swelling, heat, drainage or redness) Do you know what to do if you have?   Comment: \"no\"  6. Do you have a follow up appointment made with your surgeon? Do you have a number to contact them at if you need it?   Comment: reviewed date and time of follow up appointment.  Retained Foreign Object (MAURILIO, Hemovac, Penrose, Wound Packing, Vaginal Packing, Nasal Splints, Urethral Stents, Randall Catheter)  1. Do you still have  in place?   2. If the item is still in place, can you review the plan for removal with me?       You may be randomly selected to fill out a [facility] survey. We would appreciate you taking the time to fill this out. It is important to us if you would answer all of the questions on the survey.              "

## 2020-03-05 NOTE — TELEPHONE ENCOUNTER
Fredy had hernia surgery yesterday.    He was told that he could shower 24 hours after surgery.    He wants to know if he should use Hibiclens soap again, (as he did Pre-op).    Advised, Hibiclens would be unnecessary.  Avoid the surgical incision site area. Do not apply soap to the incision. Do not scrub the incision. Water can fall over the incision. Gently pat dry.      Sheree Palma RN  [facility]        Reason for Disposition    General information question, no triage required and triager able to answer question    Protocols used: INFORMATION ONLY CALL-A-AH

## 2020-03-09 ENCOUNTER — OFFICE VISIT (OUTPATIENT)
Dept: SURGERY | Facility: CLINIC | Age: 45
End: 2020-03-09
Payer: COMMERCIAL

## 2020-03-09 VITALS
WEIGHT: 182 LBS | BODY MASS INDEX: 25.94 KG/M2 | TEMPERATURE: 97.6 F | SYSTOLIC BLOOD PRESSURE: 124 MMHG | DIASTOLIC BLOOD PRESSURE: 76 MMHG

## 2020-03-09 DIAGNOSIS — Z98.890 POST-OPERATIVE STATE: Primary | ICD-10-CM

## 2020-03-09 PROCEDURE — 99024 POSTOP FOLLOW-UP VISIT: CPT | Performed by: SPECIALIST

## 2020-03-09 NOTE — LETTER
3/9/2020         RE: Fredy Keys  [address] [facility]  [place] [address]        Dear Colleague,    Thank you for referring your patient, Fredy Keys, to the [facility]. Please see a copy of my visit note below.    F/U for lap left inguinal hernia repair    Subjective:  Patient feels good no complaints.  Like to go back to work tomorrow.    Objective:  B/P: 124/76, T: 97.6, P: Data Unavailable, R: Data Unavailable  Abd; Incisions healing well.  Hernia repair intact    Assessment/plan:  There is a 44-year-old gentleman status post a lap left inguinal hernia repair.  Doing well.  He will gradual increase his activity and follow-up with me as necessary.  He go back to work tomorrow with a 20 pound restriction for 2 weeks.    Trell Escobar MD, FACS    Again, thank you for allowing me to participate in the care of your patient.        Sincerely,        Trell Escobar MD

## 2020-03-09 NOTE — LETTER
[address] [address]  Phone: [phone]    March 9, 2020        Fredy Keys  [address] [address] [place] [id number]          To whom it may concern:    RE: Fredy Keys    Patient was seen and treated today at our clinic.  Patient may return to work Tuesday 3- with the following:  Light duty-unable to lift more than 20 pounds for 2 weeks, than may resume normal activities after 2 weeks.    Please contact me for questions or concerns.      Sincerely,        Trell Escobar MD, FACS

## 2020-03-09 NOTE — PROGRESS NOTES
F/U for lap left inguinal hernia repair    Subjective:  Patient feels good no complaints.  Like to go back to work tomorrow.    Objective:  B/P: 124/76, T: 97.6, P: Data Unavailable, R: Data Unavailable  Abd; Incisions healing well.  Hernia repair intact    Assessment/plan:  There is a 44-year-old gentleman status post a lap left inguinal hernia repair.  Doing well.  He will gradual increase his activity and follow-up with me as necessary.  He go back to work tomorrow with a 20 pound restriction for 2 weeks.    Trell Escobar MD, FACS

## 2020-06-10 DIAGNOSIS — H10.13 ALLERGIC CONJUNCTIVITIS, BILATERAL: ICD-10-CM

## 2020-06-10 DIAGNOSIS — J31.0 CHRONIC RHINITIS: ICD-10-CM

## 2020-06-10 DIAGNOSIS — K21.00 GASTROESOPHAGEAL REFLUX DISEASE WITH ESOPHAGITIS: ICD-10-CM

## 2020-06-10 NOTE — TELEPHONE ENCOUNTER
Pt would like to see if Dr would rewrite for  90 day supplies on his omeprazole, azelastine 0.1 nasal spray and for his olopatadine  Eye drops

## 2020-06-12 RX ORDER — AZELASTINE 1 MG/ML
2 SPRAY, METERED NASAL 2 TIMES DAILY
Qty: 90 ML | Refills: 3 | Status: SHIPPED | OUTPATIENT
Start: 2020-06-12 | End: 2021-06-29

## 2020-06-12 RX ORDER — OLOPATADINE HYDROCHLORIDE 2 MG/ML
1 SOLUTION/ DROPS OPHTHALMIC DAILY
Qty: 3 BOTTLE | Refills: 4 | Status: SHIPPED | OUTPATIENT
Start: 2020-06-12 | End: 2021-06-29

## 2020-06-12 RX ORDER — OMEPRAZOLE 40 MG/1
40 CAPSULE, DELAYED RELEASE ORAL DAILY
Qty: 90 CAPSULE | Refills: 3 | Status: SHIPPED | OUTPATIENT
Start: 2020-06-12 | End: 2023-03-16

## 2020-06-12 NOTE — TELEPHONE ENCOUNTER
"Olopatadine   Last Written Prescription Date:  8/22/19  Last Fill Quantity: 1 bottle,  # refills: 11   Last office visit: 2/24/2020 with prescribing provider:  Merrill Estrada   Future Office Visit:      Azelastine   Last Written Prescription Date:  11/4/19  Last Fill Quantity: 30 mL,  # refills: 3   Last office visit: 2/24/2020 with prescribing provider:  Jeovany Pedersen    Future Office Visit:      Omeprazole   Last Written Prescription Date:  2/24/20  Last Fill Quantity: 30,  # refills: 4   Last office visit: 2/24/2020 with prescribing provider:  Shree Cee   Future Office Visit:        Requested Prescriptions   Pending Prescriptions Disp Refills     omeprazole (PRILOSEC) 40 MG DR capsule 90 capsule 3     Sig: Take 1 capsule (40 mg) by mouth daily       PPI Protocol Passed - 6/10/2020  2:43 PM        Passed - Not on Clopidogrel (unless Pantoprazole ordered)        Passed - No diagnosis of osteoporosis on record        Passed - Recent (12 mo) or future (30 days) visit within the authorizing provider's specialty     Patient has had an office visit with the authorizing provider or a provider within the authorizing providers department within the previous 12 mos or has a future within next 30 days. See \"Patient Info\" tab in inbasket, or \"Choose Columns\" in Meds & Orders section of the refill encounter.              Passed - Medication is active on med list        Passed - Patient is age 18 or older           azelastine (ASTELIN) 0.1 % nasal spray 90 mL 3     Sig: Spray 2 sprays into both nostrils 2 times daily       Antihistamines Protocol Failed - 6/10/2020  2:43 PM        Failed - Medication is active on med list        Passed - Patient is 3-64 years of age     Apply weight-based dosing for peds patients age 3 - 12 years of age.    Forward request to provider for patients under the age of 3 or over the age of 64.          Passed - Recent (12 mo) or future (30 days) visit within the authorizing provider's " "specialty     Patient has had an office visit with the authorizing provider or a provider within the authorizing providers department within the previous 12 mos or has a future within next 30 days. See \"Patient Info\" tab in inbasket, or \"Choose Columns\" in Meds & Orders section of the refill encounter.             Nasal Allergy Protocol Failed - 6/10/2020  2:43 PM        Failed - Medication is active on med list        Passed - Patient is age 12 or older        Passed - Recent (12 mo) or future (30 days) visit within the authorizing provider's specialty     Patient has had an office visit with the authorizing provider or a provider within the authorizing providers department within the previous 12 mos or has a future within next 30 days. See \"Patient Info\" tab in inbasket, or \"Choose Columns\" in Meds & Orders section of the refill encounter.                 olopatadine (PATADAY) 0.2 % ophthalmic solution 3 Bottle 4     Sig: Apply 1 drop to eye daily       Miscellaneous Opthalmic Allergy Drops Protocol Passed - 6/10/2020  2:43 PM        Passed - Patient is age 4 or older        Passed - Recent (12 mo) or future (30 days) visit within the authorizing provider's specialty     Patient has had an office visit with the authorizing provider or a provider within the authorizing providers department within the previous 12 mos or has a future within next 30 days. See \"Patient Info\" tab in inbasket, or \"Choose Columns\" in Meds & Orders section of the refill encounter.              Passed - Medication is active on med list           Prescription approved per [facility] Refill Protocol.  Arcelia Hill RN on 6/12/2020 at 1:18 PM    "

## 2020-06-18 ENCOUNTER — OFFICE VISIT (OUTPATIENT)
Dept: SURGERY | Facility: CLINIC | Age: 45
End: 2020-06-18
Attending: FAMILY MEDICINE
Payer: COMMERCIAL

## 2020-06-18 ENCOUNTER — TELEPHONE (OUTPATIENT)
Dept: SURGERY | Facility: OTHER | Age: 45
End: 2020-06-18

## 2020-06-18 VITALS
DIASTOLIC BLOOD PRESSURE: 82 MMHG | BODY MASS INDEX: 26.25 KG/M2 | TEMPERATURE: 97.6 F | WEIGHT: 187.5 LBS | HEIGHT: 71 IN | SYSTOLIC BLOOD PRESSURE: 130 MMHG

## 2020-06-18 DIAGNOSIS — D17.24 LIPOMA OF BOTH LOWER EXTREMITIES: ICD-10-CM

## 2020-06-18 DIAGNOSIS — Z11.59 ENCOUNTER FOR SCREENING FOR OTHER VIRAL DISEASES: Primary | ICD-10-CM

## 2020-06-18 DIAGNOSIS — D17.1 LIPOMA OF BACK: Primary | ICD-10-CM

## 2020-06-18 DIAGNOSIS — D17.23 LIPOMA OF BOTH LOWER EXTREMITIES: ICD-10-CM

## 2020-06-18 PROCEDURE — 99214 OFFICE O/P EST MOD 30 MIN: CPT | Performed by: SPECIALIST

## 2020-06-18 ASSESSMENT — MIFFLIN-ST. JEOR: SCORE: 1757.62

## 2020-06-18 NOTE — PROGRESS NOTES
F/U for lipomas      Subjective:  Patient is a 44-year-old white male well-known to me for a laparoscopic left inguinal hernia repair done early this year.  He also has a known history of lipomas that have slowly been enlarging.  He had some removed in his late 20s.  He now has 3 enlarging ones on his right flank as well as his posterior thigh.  They are starting to bother him he like them excised.    Objective:  B/P: 130/82, T: 97.6, P: Data Unavailable, R: Data Unavailable  Back: 3 subcutaneous masses - ranging from 3-6 cm in size.  Left Post Thigh: 3x2x2 cm subcutaneous mass.      Assessment/plan:  This a 44-year-old gentleman well-known to me who now returns with multiple subcutaneous masses that are probably lipomas.  After discussion with the patient the plan at this time is for an excision under MAC anesthesia.   The procedure, risks, benefits, and alternatives were discussed and the patient agrees to proceed.  He will be scheduled the near future.    Trell Escobar MD, FACS

## 2020-06-18 NOTE — TELEPHONE ENCOUNTER
Type of surgery: excision back masses, excision leg masses  Location of surgery: [facility]   Date of surgery: 6/29/20  Surgeon: Dr. Escobar  Pre-Op Appt Date: 6/26/20  Post-Op Appt Date: 7/8/20   Packet sent out: Surgery packet was given to patient in clinic.   Pre-cert/Authorization completed: NA  Date: 6/18/2020    Tiara Holden  Surgery Scheduler

## 2020-06-18 NOTE — LETTER
6/18/2020         RE: Fredy Keys  [id number] [facility]  [place] [address]        Dear Colleague,    Thank you for referring your patient, Fredy Keys, to the [facility]. Please see a copy of my visit note below.    F/U for lipomas      Subjective:  Patient is a 44-year-old white male well-known to me for a laparoscopic left inguinal hernia repair done early this year.  He also has a known history of lipomas that have slowly been enlarging.  He had some removed in his late 20s.  He now has 3 enlarging ones on his right flank as well as his posterior thigh.  They are starting to bother him he like them excised.    Objective:  B/P: 130/82, T: 97.6, P: Data Unavailable, R: Data Unavailable  Back: 3 subcutaneous masses - ranging from 3-6 cm in size.  Left Post Thigh: 3x2x2 cm subcutaneous mass.      Assessment/plan:  This a 44-year-old gentleman well-known to me who now returns with multiple subcutaneous masses that are probably lipomas.  After discussion with the patient the plan at this time is for an excision under MAC anesthesia.   The procedure, risks, benefits, and alternatives were discussed and the patient agrees to proceed.  He will be scheduled the near future.    Trell Escobar MD, FACS      Again, thank you for allowing me to participate in the care of your patient.        Sincerely,        Trell Escobar MD

## 2020-06-26 ENCOUNTER — OFFICE VISIT (OUTPATIENT)
Dept: FAMILY MEDICINE | Facility: CLINIC | Age: 45
End: 2020-06-26
Payer: COMMERCIAL

## 2020-06-26 VITALS
BODY MASS INDEX: 25.86 KG/M2 | WEIGHT: 185.4 LBS | SYSTOLIC BLOOD PRESSURE: 110 MMHG | DIASTOLIC BLOOD PRESSURE: 70 MMHG | OXYGEN SATURATION: 98 % | HEART RATE: 66 BPM | TEMPERATURE: 98.2 F | RESPIRATION RATE: 14 BRPM

## 2020-06-26 DIAGNOSIS — Z11.59 ENCOUNTER FOR SCREENING FOR OTHER VIRAL DISEASES: ICD-10-CM

## 2020-06-26 DIAGNOSIS — Z01.818 PREOP GENERAL PHYSICAL EXAM: Primary | ICD-10-CM

## 2020-06-26 DIAGNOSIS — D17.23 LIPOMA OF BOTH LOWER EXTREMITIES: ICD-10-CM

## 2020-06-26 DIAGNOSIS — D17.24 LIPOMA OF BOTH LOWER EXTREMITIES: ICD-10-CM

## 2020-06-26 PROCEDURE — U0003 INFECTIOUS AGENT DETECTION BY NUCLEIC ACID (DNA OR RNA); SEVERE ACUTE RESPIRATORY SYNDROME CORONAVIRUS 2 (SARS-COV-2) (CORONAVIRUS DISEASE [COVID-19]), AMPLIFIED PROBE TECHNIQUE, MAKING USE OF HIGH THROUGHPUT TECHNOLOGIES AS DESCRIBED BY CMS-2020-01-R: HCPCS | Performed by: SPECIALIST

## 2020-06-26 PROCEDURE — 99214 OFFICE O/P EST MOD 30 MIN: CPT | Performed by: FAMILY MEDICINE

## 2020-06-26 ASSESSMENT — PAIN SCALES - GENERAL: PAINLEVEL: NO PAIN (0)

## 2020-06-26 NOTE — PROGRESS NOTES
[address] [address]  [phone]  Dept: [phone]    PRE-OP EVALUATION:  Today's date: 2020    Fredy Keys (: 1975) presents for pre-operative evaluation assessment as requested by Dr. Escobar.  He requires evaluation and anesthesia risk assessment prior to undergoing surgery/procedure for treatment of lipoma .    Proposed Surgery/ Procedure: excision back masses  Date of Surgery/ Procedure: 2020  Time of Surgery/ Procedure: 8:45am  Hospital/Surgical Facility: [facility]  Primary Physician: Merrill Estrada  Type of Anesthesia Anticipated: Local with MAC    Patient has a Health Care Directive or Living Will:  NO    1. NO - Do you have a history of heart attack, stroke, stent, bypass or surgery on an artery in the head, neck, heart or legs?  2. YES - DO YOU EVER HAVE ANY PAIN OR DISCOMFORT IN YOUR CHEST? In the past but not heart related  3. NO - Do you have a history of  Heart Failure?  4. NO - Are you troubled by shortness of breath when: walking on the level, up a slight hill or at night?  5. NO - Do you currently have a cold, bronchitis or other respiratory infection?  6. NO - Do you have a cough, shortness of breath or wheezing?  7. NO - Do you sometimes get pains in the calves of your legs when you walk?  8. YES - DO YOU OR ANYONE IN YOUR FAMILY HAVE PREVIOUS HISTORY OF BLOOD CLOTS? dad  Dad has Factor V Leiden   10. NO - Have you ever had problems with anemia or been told to take iron pills?  11. NO - Have you had any abnormal blood loss such as black, tarry or bloody stools, or abnormal vaginal bleeding?  12. NO - Have you ever had a blood transfusion?  13. NO - Have you or any of your relatives ever had problems with anesthesia?  14. NO - Do you have sleep apnea, excessive snoring or daytime drowsiness?  15. NO - Do you have any prosthetic heart valves?  16. NO - Do you have prosthetic joints?  17. NO - Is there any chance that you may  be pregnant?      HPI:     HPI related to upcoming procedure: Several enlarging lipomas that are bothering him.          MEDICAL HISTORY:     Patient Active Problem List    Diagnosis Date Noted     Lipoma of back 06/18/2020     Priority: Medium     Added automatically from request for surgery [id number]       Lipoma of both lower extremities 06/18/2020     Priority: Medium     Added automatically from request for surgery [id number]       Left inguinal hernia 02/03/2020     Priority: Medium     Added automatically from request for surgery [id number]       Routine general medical examination at a health care facility 08/22/2019     Priority: Medium     Other acute pancreatitis, unspecified complication status 12/18/2017     Priority: Medium     Allergic conjunctivitis, bilateral 06/17/2011     Priority: Medium     CARDIOVASCULAR SCREENING; LDL GOAL LESS THAN 160 10/31/2010     Priority: Medium     Chronic seasonal allergic rhinitis due to pollen 08/24/2006     Priority: Medium      Past Medical History:   Diagnosis Date     ALLERGIC RHINITIS NOS 8/24/2006     Past Surgical History:   Procedure Laterality Date     ENT SURGERY       LAPAROSCOPIC HERNIORRHAPHY INGUINAL Left 3/3/2020    Procedure: Laparoscopic left inguinal hernia repair;  Surgeon: Trell Escobar MD;  Location:  OR     Current Outpatient Medications   Medication Sig Dispense Refill     azelastine (ASTELIN) 0.1 % nasal spray Spray 2 sprays into both nostrils 2 times daily 90 mL 3     fluticasone (FLONASE) 50 MCG/ACT nasal spray Spray 1-2 sprays into both nostrils daily 42 g 3     HEMP OIL OR EXTRACT OR OTHER CBD CANNABINOID, NOT MEDICAL CANNABIS, 0.25 mLs       ibuprofen (ADVIL/MOTRIN) 800 MG tablet Take 1 tablet (800 mg) by mouth every 8 hours as needed for moderate pain (Patient not taking: Reported on 6/18/2020) 90 tablet 1     montelukast (SINGULAIR) 10 MG tablet Take 1 tablet (10 mg) by mouth At Bedtime (Patient not taking: Reported on 6/18/2020) 90  tablet 4     olopatadine (PATADAY) 0.2 % ophthalmic solution Apply 1 drop to eye daily 3 Bottle 4     omeprazole (PRILOSEC) 40 MG DR capsule Take 1 capsule (40 mg) by mouth daily 90 capsule 3     ZYRTEC-D 5-120 MG OR TB12 ONE TABLET TWICE DAILY 3 MONTHS 1 YEAR     OTC products: None, except as noted above    Allergies   Allergen Reactions     Zithromax [Azithromycin Dihydrate] Hives     Seasonal Allergies       Latex Allergy: NO    Social History     Tobacco Use     Smoking status: Never Smoker     Smokeless tobacco: Former User     Types: Chew     Tobacco comment: no smoking in the home.  1 tin/week in summer, 1 every other month in winter   Substance Use Topics     Alcohol use: Yes     Alcohol/week: 0.0 standard drinks     Comment: 4-5  drinks weekly     History   Drug Use No       REVIEW OF SYSTEMS:   Constitutional, neuro, ENT, endocrine, pulmonary, cardiac, gastrointestinal, genitourinary, musculoskeletal, integument and psychiatric systems are negative, except as otherwise noted.    EXAM:   There were no vitals taken for this visit.    GENERAL APPEARANCE: healthy, alert and no distress     EYES: EOMI,  PERRL     HENT: ear canals and TM's normal and nose and mouth without ulcers or lesions     NECK: no adenopathy, no asymmetry, masses, or scars and thyroid normal to palpation     RESP: lungs clear to auscultation - no rales, rhonchi or wheezes     CV: regular rates and rhythm, normal S1 S2, no S3 or S4 and no murmur, click or rub     ABDOMEN:  soft, nontender, no HSM or masses and bowel sounds normal     MS: extremities normal- no gross deformities noted, no evidence of inflammation in joints, FROM in all extremities.     SKIN: no suspicious lesions or rashes     NEURO: Normal strength and tone, sensory exam grossly normal, mentation intact and speech normal     PSYCH: mentation appears normal. and affect normal/bright     LYMPHATICS: No cervical adenopathy    DIAGNOSTICS:   No labs or EKG required for low risk  surgery (cataract, skin procedure, breast biopsy, etc)    Recent Labs   Lab Test 08/22/19  1635 05/09/18  1559  12/18/17  1233   HGB  --  16.4  --  16.8   PLT  --  287  --  278    140   < > 138   POTASSIUM 3.9 4.1   < > 4.3   CR 1.27* 1.30*   < > 1.35*    < > = values in this interval not displayed.        IMPRESSION:   Reason for surgery/procedure: Enlarging lipomas    The proposed surgical procedure is considered LOW risk.    REVISED CARDIAC RISK INDEX  The patient has the following serious cardiovascular risks for perioperative complications such as (MI, PE, VFib and 3  AV Block):  No serious cardiac risks  INTERPRETATION: 0 risks: Class I (very low risk - 0.4% complication rate)    The patient has the following additional risks for perioperative complications:  No identified additional risks      ICD-10-CM    1. Preop general physical exam  Z01.818        RECOMMENDATIONS:             APPROVAL GIVEN to proceed with proposed procedure, without further diagnostic evaluation       Signed Electronically by: Merrill Estrada MD    Copy of this evaluation report is provided to requesting physician.    [facility] Preop Guidelines    Revised Cardiac Risk Index

## 2020-06-27 LAB
SARS-COV-2 RNA SPEC QL NAA+PROBE: NOT DETECTED
SPECIMEN SOURCE: NORMAL

## 2020-06-28 ENCOUNTER — ANESTHESIA EVENT (OUTPATIENT)
Dept: SURGERY | Facility: CLINIC | Age: 45
End: 2020-06-28
Payer: COMMERCIAL

## 2020-06-28 ASSESSMENT — LIFESTYLE VARIABLES: TOBACCO_USE: 1

## 2020-06-28 NOTE — ANESTHESIA PREPROCEDURE EVALUATION
Anesthesia Pre-Procedure Evaluation    Patient: Fredy Keys   MRN: [id number] : 1975          Preoperative Diagnosis: Lipoma of back [D17.1]       Lipoma of both lower extremities [D17.23, D17.24]     Procedure(s):  Excision back masses (N/A Trunk)   Excision left leg mass (Left Leg)     Past Medical History:   Diagnosis Date     ALLERGIC RHINITIS NOS 2006     Past Surgical History:   Procedure Laterality Date     ENT SURGERY       LAPAROSCOPIC HERNIORRHAPHY INGUINAL Left 3/3/2020    Procedure: Laparoscopic left inguinal hernia repair;  Surgeon: Trell Escobar MD;  Location: [facility] OR       Anesthesia Evaluation     . Pt has had prior anesthetic. Type: General    No history of anesthetic complications          ROS/MED HX    ENT/Pulmonary:     (+)allergic rhinitis, tobacco use (smokeless tobacco - 1 tin/week in summer, 1 every other month in winter), Current use , . .    Neurologic:  - neg neurologic ROS     Cardiovascular:  - neg cardiovascular ROS   (+) ----. : . . . :. . Previous cardiac testing date:results:date: results:ECG reviewed date:17 results:Sinus Rhythm   WITHIN NORMAL LIMITS date: results:          METS/Exercise Tolerance:  >4 METS   Hematologic:  - neg hematologic  ROS       Musculoskeletal:  - neg musculoskeletal ROS       GI/Hepatic:     (+) GERD Asymptomatic on medication, Other GI/Hepatic hx of acute pancreatitis      Renal/Genitourinary:  - ROS Renal section negative       Endo:  - neg endo ROS       Psychiatric:  - neg psychiatric ROS       Infectious Disease:  - neg infectious disease ROS       Malignancy:      - no malignancy   Other:    - neg other ROS                        Physical Exam  Normal systems: cardiovascular, pulmonary and dental    Airway   Mallampati: II  TM distance: >3 FB  Neck ROM: full    Dental     Cardiovascular   Rhythm and rate: regular and normal      Pulmonary    breath sounds clear to auscultation            Lab Results   Component Value Date    WBC  "11.0 05/09/2018    HGB 16.4 05/09/2018    HCT 47.7 05/09/2018     05/09/2018    CRP <5.0 07/11/2014    SED 2 07/11/2014     08/22/2019    POTASSIUM 3.9 08/22/2019    CHLORIDE 106 08/22/2019    CO2 29 08/22/2019    BUN 23 08/22/2019    CR 1.27 (H) 08/22/2019    GLC 86 08/22/2019    VICKIE 9.0 08/22/2019    ALBUMIN 4.0 08/22/2019    PROTTOTAL 7.3 08/22/2019    ALT 29 08/22/2019    AST 25 08/22/2019    ALKPHOS 70 08/22/2019    BILITOTAL 0.6 08/22/2019    LIPASE 198 08/22/2019    AMYLASE 64 12/18/2017       Preop Vitals  BP Readings from Last 3 Encounters:   06/26/20 110/70   06/18/20 130/82   03/09/20 124/76    Pulse Readings from Last 3 Encounters:   06/26/20 66   03/03/20 78   02/24/20 91      Resp Readings from Last 3 Encounters:   06/26/20 14   03/03/20 16   02/24/20 16    SpO2 Readings from Last 3 Encounters:   06/26/20 98%   03/03/20 94%   02/24/20 97%      Temp Readings from Last 1 Encounters:   06/26/20 98.2  F (36.8  C) (Temporal)    Ht Readings from Last 1 Encounters:   06/18/20 1.803 m (5' 11\")      Wt Readings from Last 1 Encounters:   06/26/20 84.1 kg (185 lb 6.4 oz)    Estimated body mass index is 25.86 kg/m  as calculated from the following:    Height as of 6/18/20: 1.803 m (5' 11\").    Weight as of 6/26/20: 84.1 kg (185 lb 6.4 oz).       Anesthesia Plan      History & Physical Review  History and physical reviewed and following examination; no interval change.    ASA Status:  2 .    NPO Status:  > 8 hours    Plan for MAC with Intravenous and Propofol induction. Maintenance will be TIVA.  Reason for MAC:  Deep or markedly invasive procedure (G8)  PONV prophylaxis:  Ondansetron (or other 5HT-3) and Dexamethasone or Solumedrol         Postoperative Care  Postoperative pain management:  IV analgesics, Multi-modal analgesia and Oral pain medications.      Consents  Anesthetic plan, risks, benefits and alternatives discussed with:  Patient.  Use of blood products discussed: No .   .           "         Kerri Arteaga

## 2020-06-29 ENCOUNTER — HOSPITAL ENCOUNTER (OUTPATIENT)
Facility: CLINIC | Age: 45
Discharge: HOME | End: 2020-06-29
Attending: SPECIALIST | Admitting: SPECIALIST
Payer: COMMERCIAL

## 2020-06-29 ENCOUNTER — ANESTHESIA (OUTPATIENT)
Dept: SURGERY | Facility: CLINIC | Age: 45
End: 2020-06-29
Payer: COMMERCIAL

## 2020-06-29 VITALS
OXYGEN SATURATION: 98 % | SYSTOLIC BLOOD PRESSURE: 124 MMHG | RESPIRATION RATE: 12 BRPM | HEART RATE: 60 BPM | DIASTOLIC BLOOD PRESSURE: 85 MMHG

## 2020-06-29 DIAGNOSIS — D17.24 LIPOMA OF BOTH LOWER EXTREMITIES: ICD-10-CM

## 2020-06-29 DIAGNOSIS — D17.23 LIPOMA OF BOTH LOWER EXTREMITIES: ICD-10-CM

## 2020-06-29 DIAGNOSIS — D17.1 LIPOMA OF BACK: ICD-10-CM

## 2020-06-29 DIAGNOSIS — G89.18 POST-OP PAIN: Primary | ICD-10-CM

## 2020-06-29 PROCEDURE — 27210794 ZZH OR GENERAL SUPPLY STERILE: Performed by: SPECIALIST

## 2020-06-29 PROCEDURE — 37000008 ZZH ANESTHESIA TECHNICAL FEE, 1ST 30 MIN: Performed by: SPECIALIST

## 2020-06-29 PROCEDURE — 21931 EXC BACK LES SC 3 CM/>: CPT | Performed by: SPECIALIST

## 2020-06-29 PROCEDURE — 88304 TISSUE EXAM BY PATHOLOGIST: CPT | Mod: 26 | Performed by: SPECIALIST

## 2020-06-29 PROCEDURE — 25000128 H RX IP 250 OP 636: Performed by: SPECIALIST

## 2020-06-29 PROCEDURE — 37000009 ZZH ANESTHESIA TECHNICAL FEE, EACH ADDTL 15 MIN: Performed by: SPECIALIST

## 2020-06-29 PROCEDURE — 25000132 ZZH RX MED GY IP 250 OP 250 PS 637: Performed by: SPECIALIST

## 2020-06-29 PROCEDURE — 88304 TISSUE EXAM BY PATHOLOGIST: CPT | Performed by: SPECIALIST

## 2020-06-29 PROCEDURE — 71000027 ZZH RECOVERY PHASE 2 EACH 15 MINS: Performed by: SPECIALIST

## 2020-06-29 PROCEDURE — 21930 EXC BACK LES SC < 3 CM: CPT | Mod: 59 | Performed by: SPECIALIST

## 2020-06-29 PROCEDURE — 27337 EXC THIGH/KNEE LES SC 3 CM/>: CPT | Mod: LT | Performed by: SPECIALIST

## 2020-06-29 PROCEDURE — 25000125 ZZHC RX 250: Performed by: SPECIALIST

## 2020-06-29 PROCEDURE — 40000306 ZZH STATISTIC PRE PROC ASSESS II: Performed by: SPECIALIST

## 2020-06-29 PROCEDURE — 25000125 ZZHC RX 250: Performed by: NURSE ANESTHETIST, CERTIFIED REGISTERED

## 2020-06-29 PROCEDURE — 36000050 ZZH SURGERY LEVEL 2 1ST 30 MIN: Performed by: SPECIALIST

## 2020-06-29 PROCEDURE — 25000128 H RX IP 250 OP 636: Performed by: NURSE ANESTHETIST, CERTIFIED REGISTERED

## 2020-06-29 PROCEDURE — 25800030 ZZH RX IP 258 OP 636: Performed by: NURSE ANESTHETIST, CERTIFIED REGISTERED

## 2020-06-29 PROCEDURE — 36000052 ZZH SURGERY LEVEL 2 EA 15 ADDTL MIN: Performed by: SPECIALIST

## 2020-06-29 RX ORDER — LIDOCAINE HYDROCHLORIDE AND EPINEPHRINE 10; 10 MG/ML; UG/ML
INJECTION, SOLUTION INFILTRATION; PERINEURAL PRN
Status: DISCONTINUED | OUTPATIENT
Start: 2020-06-29 | End: 2020-06-29 | Stop reason: HOSPADM

## 2020-06-29 RX ORDER — FENTANYL CITRATE 50 UG/ML
25-50 INJECTION, SOLUTION INTRAMUSCULAR; INTRAVENOUS
Status: DISCONTINUED | OUTPATIENT
Start: 2020-06-29 | End: 2020-06-29 | Stop reason: HOSPADM

## 2020-06-29 RX ORDER — HYDROCODONE BITARTRATE AND ACETAMINOPHEN 5; 325 MG/1; MG/1
1-2 TABLET ORAL
Status: COMPLETED | OUTPATIENT
Start: 2020-06-29 | End: 2020-06-29

## 2020-06-29 RX ORDER — HYDROMORPHONE HYDROCHLORIDE 1 MG/ML
.3-.5 INJECTION, SOLUTION INTRAMUSCULAR; INTRAVENOUS; SUBCUTANEOUS EVERY 10 MIN PRN
Status: DISCONTINUED | OUTPATIENT
Start: 2020-06-29 | End: 2020-06-29 | Stop reason: HOSPADM

## 2020-06-29 RX ORDER — PROPOFOL 10 MG/ML
INJECTION, EMULSION INTRAVENOUS CONTINUOUS PRN
Status: DISCONTINUED | OUTPATIENT
Start: 2020-06-29 | End: 2020-06-29

## 2020-06-29 RX ORDER — HYDROCODONE BITARTRATE AND ACETAMINOPHEN 5; 325 MG/1; MG/1
2 TABLET ORAL
Status: DISCONTINUED | OUTPATIENT
Start: 2020-06-29 | End: 2020-06-29

## 2020-06-29 RX ORDER — ONDANSETRON 2 MG/ML
4 INJECTION INTRAMUSCULAR; INTRAVENOUS EVERY 30 MIN PRN
Status: DISCONTINUED | OUTPATIENT
Start: 2020-06-29 | End: 2020-06-29 | Stop reason: HOSPADM

## 2020-06-29 RX ORDER — CEFAZOLIN SODIUM 1 G/3ML
1 INJECTION, POWDER, FOR SOLUTION INTRAMUSCULAR; INTRAVENOUS SEE ADMIN INSTRUCTIONS
Status: DISCONTINUED | OUTPATIENT
Start: 2020-06-29 | End: 2020-06-29 | Stop reason: HOSPADM

## 2020-06-29 RX ORDER — BUPIVACAINE HYDROCHLORIDE AND EPINEPHRINE 2.5; 5 MG/ML; UG/ML
INJECTION, SOLUTION INFILTRATION; PERINEURAL PRN
Status: DISCONTINUED | OUTPATIENT
Start: 2020-06-29 | End: 2020-06-29 | Stop reason: HOSPADM

## 2020-06-29 RX ORDER — PROPOFOL 10 MG/ML
INJECTION, EMULSION INTRAVENOUS PRN
Status: DISCONTINUED | OUTPATIENT
Start: 2020-06-29 | End: 2020-06-29

## 2020-06-29 RX ORDER — OXYCODONE HYDROCHLORIDE 5 MG/1
5 TABLET ORAL EVERY 4 HOURS PRN
Status: CANCELLED | OUTPATIENT
Start: 2020-06-29

## 2020-06-29 RX ORDER — CEFAZOLIN SODIUM 2 G/100ML
2 INJECTION, SOLUTION INTRAVENOUS
Status: COMPLETED | OUTPATIENT
Start: 2020-06-29 | End: 2020-06-29

## 2020-06-29 RX ORDER — ONDANSETRON 4 MG/1
4 TABLET, ORALLY DISINTEGRATING ORAL EVERY 30 MIN PRN
Status: DISCONTINUED | OUTPATIENT
Start: 2020-06-29 | End: 2020-06-29 | Stop reason: HOSPADM

## 2020-06-29 RX ORDER — DIMENHYDRINATE 50 MG/ML
25 INJECTION, SOLUTION INTRAMUSCULAR; INTRAVENOUS
Status: DISCONTINUED | OUTPATIENT
Start: 2020-06-29 | End: 2020-06-29 | Stop reason: HOSPADM

## 2020-06-29 RX ORDER — NALOXONE HYDROCHLORIDE 0.4 MG/ML
.1-.4 INJECTION, SOLUTION INTRAMUSCULAR; INTRAVENOUS; SUBCUTANEOUS
Status: DISCONTINUED | OUTPATIENT
Start: 2020-06-29 | End: 2020-06-29 | Stop reason: HOSPADM

## 2020-06-29 RX ORDER — SODIUM CHLORIDE, SODIUM LACTATE, POTASSIUM CHLORIDE, CALCIUM CHLORIDE 600; 310; 30; 20 MG/100ML; MG/100ML; MG/100ML; MG/100ML
INJECTION, SOLUTION INTRAVENOUS CONTINUOUS
Status: DISCONTINUED | OUTPATIENT
Start: 2020-06-29 | End: 2020-06-29 | Stop reason: HOSPADM

## 2020-06-29 RX ORDER — FENTANYL CITRATE 50 UG/ML
INJECTION, SOLUTION INTRAMUSCULAR; INTRAVENOUS PRN
Status: DISCONTINUED | OUTPATIENT
Start: 2020-06-29 | End: 2020-06-29

## 2020-06-29 RX ORDER — LIDOCAINE HYDROCHLORIDE 20 MG/ML
INJECTION, SOLUTION INFILTRATION; PERINEURAL PRN
Status: DISCONTINUED | OUTPATIENT
Start: 2020-06-29 | End: 2020-06-29

## 2020-06-29 RX ORDER — ACETAMINOPHEN 325 MG/1
975 TABLET ORAL ONCE
Status: DISCONTINUED | OUTPATIENT
Start: 2020-06-29 | End: 2020-06-29 | Stop reason: HOSPADM

## 2020-06-29 RX ORDER — HYDROCODONE BITARTRATE AND ACETAMINOPHEN 5; 325 MG/1; MG/1
1-2 TABLET ORAL EVERY 6 HOURS PRN
Qty: 10 TABLET | Refills: 0 | Status: SHIPPED | OUTPATIENT
Start: 2020-06-29 | End: 2020-09-18

## 2020-06-29 RX ADMIN — HYDROCODONE BITARTRATE AND ACETAMINOPHEN 1 TABLET: 5; 325 TABLET ORAL at 11:40

## 2020-06-29 RX ADMIN — MIDAZOLAM 2 MG: 1 INJECTION INTRAMUSCULAR; INTRAVENOUS at 09:16

## 2020-06-29 RX ADMIN — PROPOFOL 30 MG: 10 INJECTION, EMULSION INTRAVENOUS at 09:33

## 2020-06-29 RX ADMIN — PROPOFOL 150 MCG/KG/MIN: 10 INJECTION, EMULSION INTRAVENOUS at 09:21

## 2020-06-29 RX ADMIN — LIDOCAINE HYDROCHLORIDE 60 MG: 20 INJECTION, SOLUTION INFILTRATION; PERINEURAL at 09:21

## 2020-06-29 RX ADMIN — FENTANYL CITRATE 50 MCG: 50 INJECTION, SOLUTION INTRAMUSCULAR; INTRAVENOUS at 09:21

## 2020-06-29 RX ADMIN — CEFAZOLIN SODIUM 2 G: 2 INJECTION, SOLUTION INTRAVENOUS at 09:23

## 2020-06-29 RX ADMIN — LIDOCAINE HYDROCHLORIDE 1 ML: 10 INJECTION, SOLUTION EPIDURAL; INFILTRATION; INTRACAUDAL; PERINEURAL at 08:31

## 2020-06-29 RX ADMIN — SODIUM CHLORIDE, POTASSIUM CHLORIDE, SODIUM LACTATE AND CALCIUM CHLORIDE: 600; 310; 30; 20 INJECTION, SOLUTION INTRAVENOUS at 08:30

## 2020-06-29 RX ADMIN — FENTANYL CITRATE 50 MCG: 50 INJECTION, SOLUTION INTRAMUSCULAR; INTRAVENOUS at 09:56

## 2020-06-29 NOTE — BRIEF OP NOTE
[facility] Brief Operative Note    Pre-operative diagnosis: Lipoma of right back [D17.1]  Lipoma of left lower extremitie   Post-operative diagnosis Same   Procedure: 1) excision right sup flank subcutaneous mass 4x3.5x1cm  2) excision right mid flank subcutaneous mass 2.7x1.7x1cm  3) excision right inf flank subcutaneous mass 3.5x2x1.6cm  4) excision left post thigh subcutaneous mass [id number]   Surgeon(s): Surgeon(s) and Role:     * Trell Escobar MD - Primary   Estimated blood loss: 1 mL    Specimens: ID Type Source Tests Collected by Time Destination   A : Right Superior Back Mass Tissue Back SURGICAL PATHOLOGY EXAM Trell Escobar MD 6/29/2020  9:27 AM    B : Right Middle Back Mass Tissue Back SURGICAL PATHOLOGY EXAM Trell Escobar MD 6/29/2020  9:27 AM    C : Right Inferior Back Mass Tissue Back SURGICAL PATHOLOGY EXAM Trell Escobar MD 6/29/2020  9:28 AM    D : Left Posterior Thigh Mass Tissue Leg, left SURGICAL PATHOLOGY EXAM Trell Escobar MD 6/29/2020  9:29 AM       Findings: Multiple subcutaneous masses as described above - probable lipomas       Trell Escobar MD, FACS      #[id number]

## 2020-06-29 NOTE — DISCHARGE INSTRUCTIONS
[facility] Same[facility]Day [facility]   Adult Discharge Orders & Instructions     For 24 hours after surgery    1. Get plenty of rest.  A responsible adult must stay with you for at least 24 hours after you leave the hospital.   2. Do not drive or use heavy equipment.  If you have weakness or tingling, don't drive or use heavy equipment until this feeling goes away.  3. Do not drink alcohol.  4. Avoid strenuous or risky activities.  Ask for help when climbing stairs.   5. You may feel lightheaded.  If so, sit for a few minutes before standing.  Have someone help you get up.   6. You may have a slight fever. Call the doctor if your fever is over 100 F (37.7 C) (taken under the tongue) or lasts longer than 24 hours.  7. You may have a dry mouth, a sore throat, muscle aches or trouble sleeping.  These should go away after 24 hours.  8. Do not make important or legal decisions.  We don t expect you to have any problems from the surgery or treatment you had today. Just in case, here s what to do if you have pain, upset stomach (nausea), bleeding or infection:  Pain:  Take medicines your doctor has prescribed or over-the-counter medicine they have suggested. Resting and using ice packs can help, too. For surgery on an arm or leg, raise it on a pillow to ease swelling. Call your doctor if these methods don t work.  Copyright Varun Broussard, Licensed under [employer]  Upset stomach (nausea):  Take anti-nausea medicine approved by your doctor. Drink clear liquids like apple juice, ginger ale, broth or 7-Up. Be sure to drink enough fluids. Rest can help, too. Move to normal foods when you re ready. Bleeding:  In the first 24 hours, you may see a little blood on your dressing, about the size of a quarter. You don t need to worry about this much blood, but if the blood spot keeps getting bigger:    Put pressure on the wound if you can, AND    Call your doctor.  Copyright [employer], Licensed under [employer]  International  Fever/Infection: Please call your doctor if you have any of these signs:    Redness    Swelling    Wound feels warm    Pain gets worse    Bad-smelling fluid leaks from wound    Fever or chills  Call your doctor for any of the followin.  It has been over 8 to 10 hours since surgery and you are still not able to urinate (pass water).    2.  Headache for over 24 hours.    Nurse advice line: [phone]

## 2020-06-29 NOTE — ANESTHESIA POSTPROCEDURE EVALUATION
Patient: Fredy Keys    Procedure(s):  Excision back masses  Excision left leg mass    Diagnosis:Lipoma of back [D17.1]  Lipoma of both lower extremities [D17.23, D17.24]  Diagnosis Additional Information: No value filed.    Anesthesia Type:  MAC    Note:  Anesthesia Post Evaluation    Patient location during evaluation: Phase 2  Patient participation: Able to fully participate in evaluation  Level of consciousness: awake and alert  Pain management: adequate  Airway patency: patent  Cardiovascular status: acceptable and stable  Respiratory status: acceptable and room air  Hydration status: acceptable  PONV: none     Anesthetic complications: None    Comments:  Patient was happy with the anesthesia care received and no anesthesia related complications were noted.  I will follow up with the patient again if it is needed.        Last vitals:  Vitals:    06/29/20 1030 06/29/20 1045 06/29/20 1115   BP: 96/57 95/61 118/77   Pulse: 69 67 56   Resp:      SpO2: 98% 94% 98%         Electronically Signed By: ELIE Velazquez CRNA  June 29, 2020  11:22 AM

## 2020-06-29 NOTE — OP NOTE
Procedure Date: 06/29/2020      PREOPERATIVE DIAGNOSES:  Lipoma of right flank and right lower extremity.      POSTOPERATIVE DIAGNOSES:  Lipoma of right flank and right lower extremity.      PROCEDURES PERFORMED:   1.  Excision of right superior flank subcutaneous mass 4 x 3.5 x 1 cm in size.   2.  Excision of right mid flank subcutaneous mass measuring 2.7 x 1.7x 1 cm in size.   3.  Excision of right inferior flank subcutaneous mass measuring 3.5 x 2.5 x 1.6 cm in size.   4.  Excision of left posterior thigh subcutaneous mass measuring 3 x 3 x 1 cm in size.      SURGEON:  Trell Escobar MD, FACS      ANESTHESIA:  MAC.      INDICATIONS FOR PROCEDURE:  This is a 44-year-old gentleman who presented to clinic with multiple subcutaneous masses, 3 on the right flank and 1 in the posterior thigh.  They were causing him some discomfort, so he opted to have them excised.      OPERATIVE FINDINGS:  Multiple subcutaneous masses described above, probable lipomas.      DETAILS OF PROCEDURE:  With cooperation of the patient in the preoperative holding area, the masses in the right flank and left thigh were marked.  He was then taken to the operating room and after receiving some sedation initially, the right flank was prepped and draped in sterile fashion.  A timeout was performed confirming the identity of the patient as well as the procedure to be performed.  The skin over each of the masses was infiltrated with local anesthetic.  After adequate analgesia was achieved, a transverse incision was then made over the superior mass initially.  Subcutaneous tissue was dissected free using Metzenbaum scissors.  It was readily identified and felt to be a lipoma.  It was dissected free using Metzenbaum scissors and cautery and submitted as specimen with the measurements as stated previously.  In a similar fashion, the mid and inferior subcutaneous masses were removed.  All skin incisions were then closed using 3-0 Vicryl and Exofin  glue.  The patient was then repositioned and the left thigh was prepped and draped in sterile fashion.  The area was then infiltrated with a local anesthetic.  After adequate analgesia was achieved, an incision was then made over the subcutaneous mass.  Subcutaneous tissue was opened with cautery.  The mass was then dissected free using Metzenbaum scissors and also felt to be a lipoma.  It was submitted as specimen with the measurements as stated previously.  Subcutaneous tissue was reapproximated using 3-0 Vicryl.  The skin was closed using Exofin glue.  Sterile dressings were applied.  The patient was taken from the operating room to the recovery room in stable condition, to be sent home.         MARTA MCCOY MD, FACS             D: 2020   T: 2020   MT: LISBETH      Name:     SOFIA THAYER   MRN:      [id number]        Account:        [id number]   :      1975           Procedure Date: 2020      Document: [id number]

## 2020-06-29 NOTE — ANESTHESIA CARE TRANSFER NOTE
Patient: Fredy Keys    Procedure(s):  Excision back masses  Excision left leg mass    Diagnosis: Lipoma of back [D17.1]  Lipoma of both lower extremities [D17.23, D17.24]  Diagnosis Additional Information: No value filed.    Anesthesia Type:   MAC     Note:  Airway :Face Mask  Patient transferred to:Phase II  Handoff Report: Identifed the Patient, Identified the Reponsible Provider, Reviewed the pertinent medical history, Discussed the surgical course, Reviewed Intra-OP anesthesia mangement and issues during anesthesia, Set expectations for post-procedure period and Allowed opportunity for questions and acknowledgement of understanding      Vitals: (Last set prior to Anesthesia Care Transfer)    CRNA VITALS  6/29/2020 0941 - 6/29/2020 1017      6/29/2020             Pulse:  80    SpO2:  99 %                Electronically Signed By: ELIE Velzaquez CRNA  June 29, 2020  10:17 AM

## 2020-06-30 NOTE — OR NURSING
[facility] Same Day Surgery  Discharge Call Back  Fredy Keys  1975  MRN: [id number]  Home: [phone] (home)   PCP: Merrill Estrada    We are calling to see how you're doing since your surgery/procedure with us?   Comments: Doing pretty good  Clinical Questions  1. Have you had time to look at your discharge instructions? Do you have any questions in regards to the instructions?   Comment: y/n  2. Do you feel your pain is being controlled with the regimen the surgeon sent you home on? (ie: prescription medications, over the counter pain medications, ice packs)   Comments: y  3. Have you noticed any drainage on your dressing? Do you know what to do if you have bleeding as a result of your procedure?   Comments: n/y  4. Have you had any nausea/vomiting? Do you know how to treat this?   Comment: n/y  5. Have you had any signs/symptoms of infection? (ie: fever, swelling, heat, drainage or redness) Do you know what to do if you have?   Comment: n/y  6. Do you have a follow up appointment made with your surgeon? Do you have a number to contact them at if you need it?   Comment: n/y    You may be randomly selected to fill out a [facility] survey. We would appreciate you taking the time to fill this out. It is important to us if you would answer all of the questions on the survey.

## 2020-07-01 LAB — COPATH REPORT: NORMAL

## 2020-07-07 ENCOUNTER — TELEPHONE (OUTPATIENT)
Dept: SURGERY | Facility: OTHER | Age: 45
End: 2020-07-07

## 2020-07-07 NOTE — TELEPHONE ENCOUNTER
"Pt is s/p excision back masses 6/29/20. Reports redness and itching of all 4 incision sites. They do not look swollen but \"feel swollen.\" Denies drainage, fever, chills. Incision sites are not covered. Has been using triple abx ointment. Has post-op check tomorrow and is wondering if he can get an apt w/Dr. Escobar today. RN advised that Dr. Escobar is not in clinic today. Recommended hydrocortisone cream, cool compresses, and covering incision sites. Also advised patient he can try to be seen by his PCP today. Patient verbalized understanding and is okay with waiting to see Dr. Escobar tomorrow. Reviewed urgent s/s of infection. He has no further questions.    Benjy Garcia RN....7/7/2020 12:53 PM     "

## 2020-07-07 NOTE — TELEPHONE ENCOUNTER
Reason for Call:  Other call back    Detailed comments: Please call, pt states that he had some lipoma spots removed last week and they spots are red and itchy.     Phone Number Patient can be reached at: Home number on file [phone] (home)    Best Time: any    Can we leave a detailed message on this number? YES    Call taken on 7/7/2020 at 12:26 PM by Marla Cassidy

## 2020-07-08 ENCOUNTER — OFFICE VISIT (OUTPATIENT)
Dept: SURGERY | Facility: CLINIC | Age: 45
End: 2020-07-08
Payer: COMMERCIAL

## 2020-07-08 VITALS
BODY MASS INDEX: 26.26 KG/M2 | DIASTOLIC BLOOD PRESSURE: 83 MMHG | HEART RATE: 68 BPM | WEIGHT: 188.3 LBS | SYSTOLIC BLOOD PRESSURE: 124 MMHG

## 2020-07-08 DIAGNOSIS — L24.4 IRRITANT CONTACT DERMATITIS DUE TO DRUG IN CONTACT WITH SKIN: Primary | ICD-10-CM

## 2020-07-08 PROCEDURE — 99024 POSTOP FOLLOW-UP VISIT: CPT | Performed by: SPECIALIST

## 2020-07-08 RX ORDER — CEPHALEXIN 500 MG/1
500 CAPSULE ORAL 3 TIMES DAILY
Qty: 28 CAPSULE | Refills: 0 | Status: SHIPPED | OUTPATIENT
Start: 2020-07-08 | End: 2020-09-18

## 2020-07-08 NOTE — LETTER
7/8/2020         RE: Fredy Keys  [address] [facility]  [place] [address]        Dear Colleague,    Thank you for referring your patient, Fredy Keys, to the [facility]. Please see a copy of my visit note below.    F/U for excision of lipomas      Subjective:   Patient is feeling okay other than his incisions became extremely itchy about 3 days ago with increasing surrounding redness.  Is concerned he might have an infection or possible reaction to the glue used to close the incisions.    Objective:  B/P: 124/83, T: Data Unavailable, P: 68, R: Data Unavailable  Incision with surrounding irritation - follows pattern of glue.  Incisions closed.    Assessment/plan:  This is a 44-year-old gentleman status post excision of multiple lipomas.  He appears to have a contact dermatitis from the surrounding adhesive.  It appears to follow the outline of how the adhesive was placed originally.  I discussed these findings with the patient expressed understanding.  The plan at this time is to put him on hydrocortisone to the area and start him on Keflex.  If it does not improve over the next week he knows to follow-up with me.  Otherwise he will follow-up as necessary.      Trell Escobar MD, FACS    Again, thank you for allowing me to participate in the care of your patient.        Sincerely,        Trell Escobar MD

## 2020-07-08 NOTE — PROGRESS NOTES
F/U for excision of lipomas      Subjective:   Patient is feeling okay other than his incisions became extremely itchy about 3 days ago with increasing surrounding redness.  Is concerned he might have an infection or possible reaction to the glue used to close the incisions.    Objective:  B/P: 124/83, T: Data Unavailable, P: 68, R: Data Unavailable  Incision with surrounding irritation - follows pattern of glue.  Incisions closed.    Assessment/plan:  This is a 44-year-old gentleman status post excision of multiple lipomas.  He appears to have a contact dermatitis from the surrounding adhesive.  It appears to follow the outline of how the adhesive was placed originally.  I discussed these findings with the patient expressed understanding.  The plan at this time is to put him on hydrocortisone to the area and start him on Keflex.  If it does not improve over the next week he knows to follow-up with me.  Otherwise he will follow-up as necessary.      Trell Escobar MD, FACS

## 2020-09-11 ENCOUNTER — TELEPHONE (OUTPATIENT)
Dept: FAMILY MEDICINE | Facility: CLINIC | Age: 45
End: 2020-09-11

## 2020-09-11 DIAGNOSIS — Z13.6 SCREENING FOR CARDIOVASCULAR CONDITION: Primary | ICD-10-CM

## 2020-09-11 NOTE — TELEPHONE ENCOUNTER
"Reason for Call: Request for an order or referral:    Order or referral being requested: Patient is on the schedule for a physical this month. Requesting that labs be ordered in advance. He would like the \"usual protocol on follow up labs\" as well.     Date needed: as soon as possible    Has the patient been seen by the PCP for this problem? YES    Additional comments: I did explain that typically the providers prefer to do lab after the appt just in case they want other labs as well. He still wanted me to send the message.    Phone number Patient can be reached at:  Home number on file [phone] (home)    Best Time:  any    Can we leave a detailed message on this number?  YES    Call taken on 9/11/2020 at 11:46 AM by Altagracia Moncada CNA    "

## 2020-09-12 DIAGNOSIS — Z13.6 SCREENING FOR CARDIOVASCULAR CONDITION: ICD-10-CM

## 2020-09-12 LAB
ALBUMIN SERPL-MCNC: 3.7 G/DL (ref 3.4–5)
ALP SERPL-CCNC: 56 U/L (ref 40–150)
ALT SERPL W P-5'-P-CCNC: 25 U/L (ref 0–70)
ANION GAP SERPL CALCULATED.3IONS-SCNC: 4 MMOL/L (ref 3–14)
AST SERPL W P-5'-P-CCNC: 16 U/L (ref 0–45)
BILIRUB SERPL-MCNC: 0.6 MG/DL (ref 0.2–1.3)
BUN SERPL-MCNC: 14 MG/DL (ref 7–30)
CALCIUM SERPL-MCNC: 9.1 MG/DL (ref 8.5–10.1)
CHLORIDE SERPL-SCNC: 109 MMOL/L (ref 94–109)
CHOLEST SERPL-MCNC: 162 MG/DL
CO2 SERPL-SCNC: 29 MMOL/L (ref 20–32)
CREAT SERPL-MCNC: 1.34 MG/DL (ref 0.66–1.25)
GFR SERPL CREATININE-BSD FRML MDRD: 63 ML/MIN/{1.73_M2}
GLUCOSE SERPL-MCNC: 88 MG/DL (ref 70–99)
HDLC SERPL-MCNC: 53 MG/DL
LDLC SERPL CALC-MCNC: 95 MG/DL
NONHDLC SERPL-MCNC: 109 MG/DL
POTASSIUM SERPL-SCNC: 4.4 MMOL/L (ref 3.4–5.3)
PROT SERPL-MCNC: 6.9 G/DL (ref 6.8–8.8)
SODIUM SERPL-SCNC: 142 MMOL/L (ref 133–144)
TRIGL SERPL-MCNC: 69 MG/DL

## 2020-09-12 PROCEDURE — 80061 LIPID PANEL: CPT | Performed by: FAMILY MEDICINE

## 2020-09-12 PROCEDURE — 36415 COLL VENOUS BLD VENIPUNCTURE: CPT | Performed by: FAMILY MEDICINE

## 2020-09-12 PROCEDURE — 80053 COMPREHEN METABOLIC PANEL: CPT | Performed by: FAMILY MEDICINE

## 2020-09-12 NOTE — LETTER
September 24, 2020      Fredy Keys  [address] [facility]  [place] [address]        Dear ,    We are writing to inform you of your test results.    Labs show your cholesterol is good at 162. Chemistry panel was all normal except again your kidney function creatinine was up a little bit at 1.34 he has been in this range for the last couple of years.  Not sure why this is but you should avoid taking ibuprofen or Aleve as there cleared out by the kidney and can challenge it more    Resulted Orders   Lipid panel reflex to direct LDL Fasting   Result Value Ref Range    Cholesterol 162 <200 mg/dL    Triglycerides 69 <150 mg/dL    HDL Cholesterol 53 >39 mg/dL    LDL Cholesterol Calculated 95 <100 mg/dL      Comment:      Desirable:       <100 mg/dl    Non HDL Cholesterol 109 <130 mg/dL   Comprehensive metabolic panel (BMP + Alb, Alk Phos, ALT, AST, Total. Bili, TP)   Result Value Ref Range    Sodium 142 133 - 144 mmol/L    Potassium 4.4 3.4 - 5.3 mmol/L    Chloride 109 94 - 109 mmol/L    Carbon Dioxide 29 20 - 32 mmol/L    Anion Gap 4 3 - 14 mmol/L    Glucose 88 70 - 99 mg/dL    Urea Nitrogen 14 7 - 30 mg/dL    Creatinine 1.34 (H) 0.66 - 1.25 mg/dL    GFR Estimate 63 >60 mL/min/[1.73_m2]      Comment:      Non  GFR Calc  Starting 12/18/2018, serum creatinine based estimated GFR (eGFR) will be   calculated using the Chronic Kidney Disease Epidemiology Collaboration   (CKD-EPI) equation.      GFR Estimate If Black 73 >60 mL/min/[1.73_m2]      Comment:       GFR Calc  Starting 12/18/2018, serum creatinine based estimated GFR (eGFR) will be   calculated using the Chronic Kidney Disease Epidemiology Collaboration   (CKD-EPI) equation.      Calcium 9.1 8.5 - 10.1 mg/dL    Bilirubin Total 0.6 0.2 - 1.3 mg/dL    Albumin 3.7 3.4 - 5.0 g/dL    Protein Total 6.9 6.8 - 8.8 g/dL    Alkaline Phosphatase 56 40 - 150 U/L    ALT 25 0 - 70 U/L    AST 16 0 - 45 U/L       If you have any questions or  concerns, please call the clinic at the number listed above.       Sincerely,        Merrill Estrada MD/ac

## 2020-09-18 ENCOUNTER — TELEPHONE (OUTPATIENT)
Dept: FAMILY MEDICINE | Facility: CLINIC | Age: 45
End: 2020-09-18

## 2020-09-18 ENCOUNTER — OFFICE VISIT (OUTPATIENT)
Dept: FAMILY MEDICINE | Facility: CLINIC | Age: 45
End: 2020-09-18
Payer: COMMERCIAL

## 2020-09-18 VITALS
OXYGEN SATURATION: 99 % | HEART RATE: 97 BPM | TEMPERATURE: 98.1 F | SYSTOLIC BLOOD PRESSURE: 126 MMHG | DIASTOLIC BLOOD PRESSURE: 78 MMHG | WEIGHT: 187 LBS | BODY MASS INDEX: 26.08 KG/M2

## 2020-09-18 DIAGNOSIS — R10.32 LLQ ABDOMINAL PAIN: ICD-10-CM

## 2020-09-18 DIAGNOSIS — J30.1 CHRONIC SEASONAL ALLERGIC RHINITIS DUE TO POLLEN: ICD-10-CM

## 2020-09-18 DIAGNOSIS — Z23 NEED FOR PROPHYLACTIC VACCINATION AND INOCULATION AGAINST INFLUENZA: ICD-10-CM

## 2020-09-18 DIAGNOSIS — Z00.01 ENCOUNTER FOR GENERAL ADULT MEDICAL EXAMINATION WITH ABNORMAL FINDINGS: Primary | ICD-10-CM

## 2020-09-18 PROCEDURE — 99396 PREV VISIT EST AGE 40-64: CPT | Mod: 25 | Performed by: FAMILY MEDICINE

## 2020-09-18 PROCEDURE — 90686 IIV4 VACC NO PRSV 0.5 ML IM: CPT | Performed by: FAMILY MEDICINE

## 2020-09-18 PROCEDURE — 99213 OFFICE O/P EST LOW 20 MIN: CPT | Mod: 25 | Performed by: FAMILY MEDICINE

## 2020-09-18 PROCEDURE — 90471 IMMUNIZATION ADMIN: CPT | Performed by: FAMILY MEDICINE

## 2020-09-18 ASSESSMENT — ENCOUNTER SYMPTOMS
SHORTNESS OF BREATH: 0
HEADACHES: 0
ARTHRALGIAS: 1
DIARRHEA: 1
JOINT SWELLING: 0
PALPITATIONS: 0
HEMATOCHEZIA: 0
HEMATURIA: 0
COUGH: 0
NERVOUS/ANXIOUS: 0
MYALGIAS: 0
WEAKNESS: 0
NAUSEA: 0
CHILLS: 0
PARESTHESIAS: 0
DYSURIA: 0
CONSTIPATION: 0
DIZZINESS: 0
FREQUENCY: 1
EYE PAIN: 0
SORE THROAT: 0
FEVER: 0
HEARTBURN: 0
ABDOMINAL PAIN: 1

## 2020-09-18 NOTE — PROGRESS NOTES
SUBJECTIVE:   CC: Fredy Keys is an 45 year old male who presents for preventative health visit.           Patient has been advised of split billing requirements and indicates understanding: Yes  Healthy Habits:     Getting at least 3 servings of Calcium per day:  Yes    Bi-annual eye exam:  NO    Dental care twice a year:  NO    Sleep apnea or symptoms of sleep apnea:  None    Diet:  Other    Frequency of exercise:  4-5 days/week    Duration of exercise:  45-60 minutes    Taking medications regularly:  Yes    Medication side effects:  None    PHQ-2 Total Score: 0    Additional concerns today:  Yes    #1 he has been having some left lower quadrant abdominal pain.  Riggins.  He has had 2 CT scans of the abdomen one in 2004 and one in 2017.  He thought it was some diverticulitis.  These were both unrevealing though.  He states he has had some mucousy stools.    He is having more trouble with postnasal drip.  He is on multiple inhalers and takes some Sudafed.  Has seen ENT.    He has a tag in his anal area that he wants looked at.          Today's PHQ-2 Score:   PHQ-2 ( 1999 [employer]) 9/18/2020   Q1: Little interest or pleasure in doing things 0   Q2: Feeling down, depressed or hopeless 0   PHQ-2 Score 0   Q1: Little interest or pleasure in doing things Not at all   Q2: Feeling down, depressed or hopeless Not at all   PHQ-2 Score 0       Abuse: Current or Past(Physical, Sexual or Emotional)- No  Do you feel safe in your environment? Yes        Social History     Tobacco Use     Smoking status: Never Smoker     Smokeless tobacco: Former User     Types: Chew     Tobacco comment: no smoking in the home.  1 tin/week in summer, 1 every other month in winter   Substance Use Topics     Alcohol use: Yes     Alcohol/week: 0.0 standard drinks     Comment: 4-5  drinks weekly     If you drink alcohol do you typically have >3 drinks per day or >7 drinks per week? No    Alcohol Use 9/18/2020   Prescreen: >3 drinks/day or >7  drinks/week? No   Prescreen: >3 drinks/day or >7 drinks/week? -   No flowsheet data found.    Last PSA: No results found for: PSA    Reviewed orders with patient. Reviewed health maintenance and updated orders accordingly - Yes      Reviewed and updated as needed this visit by clinical staff  Tobacco  Allergies  Meds         Reviewed and updated as needed this visit by Provider        Past Medical History:   Diagnosis Date     ALLERGIC RHINITIS NOS 8/24/2006      Past Surgical History:   Procedure Laterality Date     ENT SURGERY       EXCISE MASS BACK N/A 6/29/2020    Procedure: Excision back masses;  Surgeon: Trell Escobar MD;  Location: [facility]     EXCISE MASS LOWER EXTREMITY Left 6/29/2020    Procedure: Excision left leg mass;  Surgeon: Trell Escobar MD;  Location: [facility]     LAPAROSCOPIC HERNIORRHAPHY INGUINAL Left 3/3/2020    Procedure: Laparoscopic left inguinal hernia repair;  Surgeon: Trell Escobar MD;  Location: [facility] OR       Review of Systems   Constitutional: Negative for chills and fever.   HENT: Positive for congestion and ear pain. Negative for hearing loss and sore throat.    Eyes: Negative for pain and visual disturbance.   Respiratory: Negative for cough and shortness of breath.    Cardiovascular: Negative for chest pain, palpitations and peripheral edema.   Gastrointestinal: Positive for abdominal pain and diarrhea. Negative for constipation, heartburn, hematochezia and nausea.   Genitourinary: Positive for frequency and urgency. Negative for discharge, dysuria, genital sores, hematuria and impotence.   Musculoskeletal: Positive for arthralgias. Negative for joint swelling and myalgias.   Skin: Negative for rash.   Neurological: Negative for dizziness, weakness, headaches and paresthesias.   Psychiatric/Behavioral: Negative for mood changes. The patient is not nervous/anxious.          OBJECTIVE:   /78   Pulse 97   Temp 98.1  F (36.7  C) (Temporal)   Wt 84.8 kg (187 lb)   SpO2  99%   BMI 26.08 kg/m      Physical Exam  GENERAL: healthy, alert and no distress  EYES: Eyes grossly normal to inspection, PERRL and conjunctivae and sclerae normal  HENT: normal cephalic/atraumatic, ear canals and TM's normal, nose and mouth without ulcers or lesions, oropharynx clear, oral mucous membranes moist and drainage in the posterior pharynx.  NECK: no adenopathy, no asymmetry, masses, or scars and thyroid normal to palpation  RESP: lungs clear to auscultation - no rales, rhonchi or wheezes  CV: regular rate and rhythm, normal S1 S2, no S3 or S4, no murmur, click or rub, no peripheral edema and peripheral pulses strong  ABDOMEN: soft, nontender, without hepatosplenomegaly or masses, bowel sounds normal and some tenderness to palpation in the left lower quadrant.  Also has a skin tag on the anal opening looks like a old external hemorrhoid.  Nontender.  MS: no gross musculoskeletal defects noted, no edema  SKIN: no suspicious lesions or rashes  NEURO: Normal strength and tone, mentation intact and speech normal  PSYCH: mentation appears normal, affect normal/bright    Diagnostic Test Results:  Labs reviewed in Epic    ASSESSMENT/PLAN:   1. Encounter for general adult medical examination with abnormal findings    Healthy but does have some complaints see below    2. LLQ abdominal pain  Decided we should proceed with a colonoscopy here and he is in agreement.    3. Chronic seasonal allergic rhinitis due to pollen  We will refer him to allergy to see if they want to do some testing or if there is some other medications.    4. Need for prophylactic vaccination and inoculation against influenza    - INFLUENZA VACCINE IM > 6 MONTHS VALENT IIV4 [87675]  - Vaccine Administration, Initial [06628]    Patient has been advised of split billing requirements and indicates understanding: Yes  COUNSELING:   Reviewed preventive health counseling, as reflected in patient instructions       Regular exercise       Healthy  "diet/nutrition    Estimated body mass index is 26.08 kg/m  as calculated from the following:    Height as of 6/18/20: 1.803 m (5' 11\").    Weight as of this encounter: 84.8 kg (187 lb).         He reports that he has never smoked. He has quit using smokeless tobacco.  His smokeless tobacco use included chew.      Counseling Resources:  ATP IV Guidelines  Pooled Cohorts Equation Calculator  FRAX Risk Assessment  ICSI Preventive Guidelines  Dietary Guidelines for Americans, 2010  USDA's MyPlate  ASA Prophylaxis  Lung CA Screening    Merrill Estrada MD  [facility]  "

## 2020-09-18 NOTE — LETTER
[facility] [address] [address]  [phone]      September 21, 2020      Fredy Keys  [address] [address] [address]      Dear Fredy:     To better serve you, we are sending this letter to notify you that we have attempted to contact you by telephone to schedule the following procedure(s) ordered by your physician.     ____x___   Colonoscopy   _______   Upper GI Endoscopy (EGD)   _______   Colonoscopy and Upper GI Endoscopy    To provide the highest quality of care, we strongly encourage you to call and schedule the prescribed test/procedure at your earliest convenience.   The number to the Specialty Scheduling department is [phone] and the hours are 8:00am - 4:30pm Monday through Friday.   We look forward to hearing from you.    Sincerely,    [facility]

## 2020-09-18 NOTE — TELEPHONE ENCOUNTER
Left message for patient to return call to schedule EGD/colonoscopy. If Maria C or Tiara are not available, please transfer to same day surgery

## 2020-09-21 NOTE — TELEPHONE ENCOUNTER
Left message for patient to return call to schedule EGD/colonoscopy. If Maria C or Tiara are not available, please transfer to same day surgery        x2 letter sent

## 2020-09-24 NOTE — RESULT ENCOUNTER NOTE
Labs show your cholesterol is good at 162.  Chemistry panel was all normal except again your kidney function creatinine was up a little bit at 1.34 he has been in this range for the last couple of years.  Not sure why this is but you should avoid taking ibuprofen or Aleve as there cleared out by the kidney and can challenge it more.

## 2020-09-25 DIAGNOSIS — Z11.59 ENCOUNTER FOR SCREENING FOR OTHER VIRAL DISEASES: Primary | ICD-10-CM

## 2020-09-25 NOTE — TELEPHONE ENCOUNTER
Fredy just called back and would like to do the 7 or 7:30 time on 10/2.  Please call him at [phone].

## 2020-09-25 NOTE — TELEPHONE ENCOUNTER
Date of colonoscopy/EGD: 10/2  Surgeon: Dr. Briggs  Prep:Miralax  Packet:Colonoscopy/EGD instructions emailed to patient   Date: 9/25/2020      Surgery Scheduler

## 2020-09-29 DIAGNOSIS — Z11.59 ENCOUNTER FOR SCREENING FOR OTHER VIRAL DISEASES: ICD-10-CM

## 2020-09-29 PROCEDURE — U0003 INFECTIOUS AGENT DETECTION BY NUCLEIC ACID (DNA OR RNA); SEVERE ACUTE RESPIRATORY SYNDROME CORONAVIRUS 2 (SARS-COV-2) (CORONAVIRUS DISEASE [COVID-19]), AMPLIFIED PROBE TECHNIQUE, MAKING USE OF HIGH THROUGHPUT TECHNOLOGIES AS DESCRIBED BY CMS-2020-01-R: HCPCS | Performed by: INTERNAL MEDICINE

## 2020-09-30 LAB
SARS-COV-2 RNA SPEC QL NAA+PROBE: NOT DETECTED
SPECIMEN SOURCE: NORMAL

## 2020-10-02 ENCOUNTER — HOSPITAL ENCOUNTER (OUTPATIENT)
Facility: CLINIC | Age: 45
Discharge: HOME | End: 2020-10-02
Attending: INTERNAL MEDICINE | Admitting: INTERNAL MEDICINE
Payer: COMMERCIAL

## 2020-10-02 VITALS
HEIGHT: 71 IN | DIASTOLIC BLOOD PRESSURE: 72 MMHG | OXYGEN SATURATION: 98 % | BODY MASS INDEX: 25.9 KG/M2 | TEMPERATURE: 97.7 F | SYSTOLIC BLOOD PRESSURE: 114 MMHG | WEIGHT: 185 LBS | RESPIRATION RATE: 16 BRPM | HEART RATE: 71 BPM

## 2020-10-02 LAB — COLONOSCOPY: NORMAL

## 2020-10-02 PROCEDURE — 250N000011 HC RX IP 250 OP 636: Performed by: INTERNAL MEDICINE

## 2020-10-02 PROCEDURE — 88305 TISSUE EXAM BY PATHOLOGIST: CPT | Mod: 26 | Performed by: PATHOLOGY

## 2020-10-02 PROCEDURE — 999N000057 HC STATISTIC ENDO RECOVERY CLASS 1:2 FIRST HOUR: Performed by: INTERNAL MEDICINE

## 2020-10-02 PROCEDURE — 999N000056: Performed by: INTERNAL MEDICINE

## 2020-10-02 PROCEDURE — 88305 TISSUE EXAM BY PATHOLOGIST: CPT | Mod: TC | Performed by: INTERNAL MEDICINE

## 2020-10-02 PROCEDURE — G0500 MOD SEDAT ENDO SERVICE >5YRS: HCPCS | Performed by: INTERNAL MEDICINE

## 2020-10-02 PROCEDURE — 45380 COLONOSCOPY AND BIOPSY: CPT | Performed by: INTERNAL MEDICINE

## 2020-10-02 PROCEDURE — 99153 MOD SED SAME PHYS/QHP EA: CPT | Performed by: INTERNAL MEDICINE

## 2020-10-02 RX ORDER — NALOXONE HYDROCHLORIDE 0.4 MG/ML
.1-.4 INJECTION, SOLUTION INTRAMUSCULAR; INTRAVENOUS; SUBCUTANEOUS
Status: DISCONTINUED | OUTPATIENT
Start: 2020-10-02 | End: 2020-10-02 | Stop reason: HOSPADM

## 2020-10-02 RX ORDER — ONDANSETRON 2 MG/ML
4 INJECTION INTRAMUSCULAR; INTRAVENOUS
Status: DISCONTINUED | OUTPATIENT
Start: 2020-10-02 | End: 2020-10-02 | Stop reason: HOSPADM

## 2020-10-02 RX ORDER — LIDOCAINE 40 MG/G
CREAM TOPICAL
Status: DISCONTINUED | OUTPATIENT
Start: 2020-10-02 | End: 2020-10-02 | Stop reason: HOSPADM

## 2020-10-02 RX ORDER — FENTANYL CITRATE 50 UG/ML
INJECTION, SOLUTION INTRAMUSCULAR; INTRAVENOUS PRN
Status: DISCONTINUED | OUTPATIENT
Start: 2020-10-02 | End: 2020-10-02 | Stop reason: HOSPADM

## 2020-10-02 RX ORDER — ONDANSETRON 2 MG/ML
4 INJECTION INTRAMUSCULAR; INTRAVENOUS EVERY 6 HOURS PRN
Status: DISCONTINUED | OUTPATIENT
Start: 2020-10-02 | End: 2020-10-02 | Stop reason: HOSPADM

## 2020-10-02 RX ORDER — ONDANSETRON 4 MG/1
4 TABLET, ORALLY DISINTEGRATING ORAL EVERY 6 HOURS PRN
Status: DISCONTINUED | OUTPATIENT
Start: 2020-10-02 | End: 2020-10-02 | Stop reason: HOSPADM

## 2020-10-02 RX ORDER — FLUMAZENIL 0.1 MG/ML
0.2 INJECTION, SOLUTION INTRAVENOUS
Status: DISCONTINUED | OUTPATIENT
Start: 2020-10-02 | End: 2020-10-02 | Stop reason: HOSPADM

## 2020-10-02 ASSESSMENT — MIFFLIN-ST. JEOR: SCORE: 1741.28

## 2020-10-02 NOTE — DISCHARGE INSTRUCTIONS
[facility]    Home Care Following Endoscopy          Activity:    You have just undergone an endoscopic procedure usually performed with conscious sedation.  Do not work or operate machinery (including a car) for at least 12 hours.      I encourage you to walk and attempt to pass this air as soon as possible.    Diet:    Return to the diet you were on before your procedure but eat lightly for the first 12-24 hours.    Drink plenty of water.    Resume any regular medications unless otherwise advised by your physician.  Please begin any new medication prescribed as a result of your procedure as directed by your physician.     If you had any biopsy or polyp removed please refrain from aspirin or aspirin products for 2 days.  If on Coumadin please restart as instructed by your physician.   Pain:    You may take Tylenol as needed for pain.  Expected Recovery:    You can expect some mild abdominal fullness and/or discomfort due to the air used to inflate your intestinal tract. It is also normal to have a mild sore throat after upper endoscopy.    Call Your Physician if You Have:    After Colonoscopy:  o Worsening persisting abdominal pain which is worse with activity.  o Fevers (>101 degrees F), chills or shakes.  o Passage of continued blood with bowel movements.   Any questions or concerns about your recovery, please call [phone] or after hours [phone] Nurse Advice Line.    Follow-up Care:    You should receive a call or letter with your results within 1 week. Please call if you have not received a notification of your results.  If asked to return to clinic please make an appointment 1 week after your procedure.  Call [phone].

## 2020-10-02 NOTE — CONSULTS
"[facility] GI Pre-Procedure Physical Assessment    Fredy Keys MRN# [id number]   Age: 45 year old YOB: 1975      Date of Surgery: 10/2/2020  Location [facility]      Date of Exam 10/2/2020 Facility (Same day)       Home clinic: [facility]  Primary care provider: Merrill Estrada         Active problem list:   Patient Active Problem List   Diagnosis     Chronic seasonal allergic rhinitis due to pollen     CARDIOVASCULAR SCREENING; LDL GOAL LESS THAN 160     Allergic conjunctivitis, bilateral     Other acute pancreatitis, unspecified complication status     Routine general medical examination at a health care facility     Left inguinal hernia     Lipoma of back     Lipoma of both lower extremities            Medications (include herbals and vitamins):   Any Plavix use in the last 7 days?  No     Current Facility-Administered Medications   Medication     lidocaine (LMX4) kit     lidocaine 1 % 0.1-1 mL     ondansetron (ZOFRAN) injection 4 mg     sodium chloride (PF) 0.9% PF flush 3 mL     sodium chloride (PF) 0.9% PF flush 3 mL             Allergies:      Allergies   Allergen Reactions     Zithromax [Azithromycin Dihydrate] Hives     Seasonal Allergies      Allergy to Latex?  No  Allergy to tape?    No          Social History:     Social History     Tobacco Use     Smoking status: Never Smoker     Smokeless tobacco: Former User     Types: Chew     Tobacco comment: no smoking in the home.  1 tin/week in summer, 1 every other month in winter   Substance Use Topics     Alcohol use: Yes     Alcohol/week: 0.0 standard drinks     Comment: 4-5  drinks weekly            Physical Exam:   All vitals have been reviewed  Blood pressure (!) 126/92, temperature 97.7  F (36.5  C), temperature source Oral, resp. rate 16, height 1.803 m (5' 11\"), weight 83.9 kg (185 lb), SpO2 96 %.  Airway assessment:   Patient is able to open mouth wide  Patient is able to stick out tongue    "   Lungs:   No increased work of breathing, good air exchange, clear to auscultation bilaterally, no crackles or wheezing      Cardiovascular:   Normal apical impulse, regular rate and rhythm, normal S1 and S2, no S3 or S4, and no murmur noted           Lab / Radiology Results:   All laboratory data reviewed          Assessment:   Appropriately NPO  Chief complaint or anatomic assessment of involved area: LLQ pain, change in bowel habits         Plan:   Moderate (conscious) sedation     Patient's active problems diagnostically and therapeutically optimized for the planned procedure  Risks, benefits, alternatives to sedation and blood explained and consent obtained  Risks, benefits, alternatives to procedure explained and consent obtained  Orders and progress notes are in the chart  Discharge from Phase 1 and / or Phase 2 recovery when patient meets criteria    I have reviewed the history and physical, lab finding(s), diagnostic data, medicaitons, and the plan for sedation.  I have determined this patient to be an appropriate candidate for the planned sedation / procedure and have reassessed the patient immediately prior to sedation / procedure.    I have personally and medically directed the administration of medications used.    Merrill Briggs MD

## 2020-10-02 NOTE — LETTER
[address] [address]           Tel [phone]     Fredy Keys  [address] [address] [address]      October 5, 2020    Dear Fredy,    This letter is written to inform you of the results of your recent colonoscopy.  Your polyp was an adenoma.    Adenomatous polyps are entirely benign (non-cancerous); however, patients who have developed these polyps are at an increased risk for developing additional polyps in the future. If these are not eventually removed, there is a risk of developing colon cancer. We will advise more frequent examinations with you because of the risk associated with this type of polyp.    Given these findings, I recommend that you undergo a repeat colonoscopy in 5 year(s) for surveillance. We will enter you into a recall system so you receive a reminder closer to the time that you are due for repeat examination.     Please remember that this recommendation is made with the understanding that you are not experiencing persistent changes in bowel function, bleeding per rectum, and/or significant abdominal pain. If you experience these symptoms, please contact your primary care provider for a further evaluation.     If you have any questions or concerns about the results of your colonoscopy or the appropriate follow-up, please contact my assistant at [phone]  .  Sincerely,        Merrill Briggs MD  Gastroenterology

## 2020-10-05 LAB — COPATH REPORT: NORMAL

## 2020-12-18 DIAGNOSIS — J30.1 CHRONIC SEASONAL ALLERGIC RHINITIS DUE TO POLLEN: ICD-10-CM

## 2020-12-18 RX ORDER — FLUTICASONE PROPIONATE 50 MCG
SPRAY, SUSPENSION (ML) NASAL
Qty: 48 G | Refills: 3 | Status: SHIPPED | OUTPATIENT
Start: 2020-12-18 | End: 2023-03-16

## 2020-12-18 NOTE — TELEPHONE ENCOUNTER
Prescription approved per [facility] [place].    Althea Scott Rn

## 2020-12-28 ENCOUNTER — TELEPHONE (OUTPATIENT)
Dept: FAMILY MEDICINE | Facility: CLINIC | Age: 45
End: 2020-12-28

## 2020-12-28 NOTE — TELEPHONE ENCOUNTER
Reason for Call:  Other call back    Detailed comments: patient needing a call back for some follow up questions regarding previous visit to clinic.    Phone Number Patient can be reached at: Home number on file [phone] (home)    Best Time: anytime    Can we leave a detailed message on this number? YES    Call taken on 12/28/2020 at 3:12 PM by Tomasa Mc

## 2020-12-29 ENCOUNTER — HOSPITAL ENCOUNTER (OUTPATIENT)
Dept: GENERAL RADIOLOGY | Facility: CLINIC | Age: 45
Discharge: HOME | End: 2020-12-29
Attending: INTERNAL MEDICINE | Admitting: INTERNAL MEDICINE
Payer: COMMERCIAL

## 2020-12-29 ENCOUNTER — OFFICE VISIT (OUTPATIENT)
Dept: INTERNAL MEDICINE | Facility: CLINIC | Age: 45
End: 2020-12-29
Payer: COMMERCIAL

## 2020-12-29 VITALS
RESPIRATION RATE: 16 BRPM | SYSTOLIC BLOOD PRESSURE: 132 MMHG | BODY MASS INDEX: 26.53 KG/M2 | HEART RATE: 86 BPM | TEMPERATURE: 97.9 F | OXYGEN SATURATION: 98 % | DIASTOLIC BLOOD PRESSURE: 84 MMHG | WEIGHT: 190.25 LBS

## 2020-12-29 DIAGNOSIS — G89.29 GROIN PAIN, CHRONIC, LEFT: ICD-10-CM

## 2020-12-29 DIAGNOSIS — R06.02 SOB (SHORTNESS OF BREATH): Primary | ICD-10-CM

## 2020-12-29 DIAGNOSIS — R06.02 SOB (SHORTNESS OF BREATH): ICD-10-CM

## 2020-12-29 DIAGNOSIS — R10.32 GROIN PAIN, CHRONIC, LEFT: ICD-10-CM

## 2020-12-29 PROCEDURE — 99214 OFFICE O/P EST MOD 30 MIN: CPT | Performed by: INTERNAL MEDICINE

## 2020-12-29 PROCEDURE — 71046 X-RAY EXAM CHEST 2 VIEWS: CPT

## 2020-12-29 RX ORDER — ALBUTEROL SULFATE 90 UG/1
2 AEROSOL, METERED RESPIRATORY (INHALATION) EVERY 6 HOURS
Qty: 1 INHALER | Refills: 0 | Status: SHIPPED | OUTPATIENT
Start: 2020-12-29 | End: 2021-06-30

## 2020-12-29 ASSESSMENT — PAIN SCALES - GENERAL: PAINLEVEL: NO PAIN (1)

## 2020-12-29 NOTE — PROGRESS NOTES
Subjective     Fredy Keys is a 45 year old male who presents to clinic today for the following health issues:    HPI         Asthma Follow-Up    Was ACT completed today?    Yes    ACT Total Scores 12/29/2020   ACT TOTAL SCORE (Goal Greater than or Equal to 20) 18   In the past 12 months, how many times did you visit the emergency room for your asthma without being admitted to the hospital? 0   In the past 12 months, how many times were you hospitalized overnight because of your asthma? 0       How many days per week do you miss taking your asthma controller medication?  I do not have an asthma controller medication    Please describe any recent triggers for your asthma: exercise or sports    Have you had any Emergency Room Visits, Urgent Care Visits, or Hospital Admissions since your last office visit?  No     Patient states he feels like he is more phlegm at night when laying down. Morning when he wakes up he coughs it up.       How many servings of fruits and vegetables do you eat daily?  0-1    On average, how many sweetened beverages do you drink each day (Examples: soda, juice, sweet tea, etc.  Do NOT count diet or artificially sweetened beverages)?   1- 2    How many days per week do you exercise enough to make your heart beat faster? 4    How many minutes a day do you exercise enough to make your heart beat faster? 60 or more  How many days per week do you miss taking your medication? 1    What makes it hard for you to take your medications?  remembering to take    Patient is worried that he has a hernia on the left side of abdomen. Has been having pain for the last 2 months.      EMR reviewed including: History of chief complaint, pertinent distant history, medications, concurrent diagnoses.             Chief Complaint:  #1   Episodic shortness of breath  Career as woodshop teacher  Uses daughter's albuterol with some results  Seems to be associated with exercise somewhat  Denies cough or excess sputum  "production  No signs of infection  Cold weather makes it somewhat worse as well.      #2   Left groin discomfort  Had herniorrhaphy earlier in the year  No bulge noted but feels \"stretching\".  Patient believes no hernia is forming.                           PAST, FAMILY,SOCIAL HISTORY:     Medical  History:   has a past medical history of ALLERGIC RHINITIS NOS (8/24/2006).     Surgical History:   has a past surgical history that includes ENT surgery; Laparoscopic herniorrhaphy inguinal (Left, 3/3/2020); Excise mass back (N/A, 6/29/2020); Excise mass lower extremity (Left, 6/29/2020); and Colonoscopy (N/A, 10/2/2020).     Social History:   reports that he has never smoked. He has quit using smokeless tobacco.  His smokeless tobacco use included chew. He reports current alcohol use. He reports that he does not use drugs.     Family History:  family history includes Circulatory in his father; Diabetes in his paternal grandmother; Genitourinary Problems in his father; Osteoporosis in his mother; Thyroid Disease in his mother.            MEDICATIONS  Current Outpatient Medications   Medication Sig Dispense Refill     albuterol (PROAIR HFA/PROVENTIL HFA/VENTOLIN HFA) 108 (90 Base) MCG/ACT inhaler Inhale 2 puffs into the lungs every 6 hours 1 Inhaler 0     azelastine (ASTELIN) 0.1 % nasal spray Spray 2 sprays into both nostrils 2 times daily 90 mL 3     fluticasone (FLONASE) 50 MCG/ACT nasal spray USE ONE TO TWO SPRAYS IN EACH NOSTRIL EVERY DAY 48 g 3     olopatadine (PATADAY) 0.2 % ophthalmic solution Apply 1 drop to eye daily 3 Bottle 4     omeprazole (PRILOSEC) 40 MG DR capsule Take 1 capsule (40 mg) by mouth daily 90 capsule 3     HEMP OIL OR EXTRACT OR OTHER CBD CANNABINOID, NOT MEDICAL CANNABIS, 0.25 mLs       montelukast (SINGULAIR) 10 MG tablet Take 1 tablet (10 mg) by mouth At Bedtime (Patient not taking: Reported on 6/18/2020) 90 tablet 4 "         --------------------------------------------------------------------------------------------------------------------                              Review of Systems       LUNGS: Pt denies: cough, excess sputum, hemoptysis, or shortness of breath at rest.  Does have some dyspnea with exertion cold exposure..   HEART: Pt denies: chest pain, arrhythmia, syncope, tachy or bradyarrhythmia.   GI: Pt denies: nausea, vomiting, diarrhea, constipation, melena, or hematochezia.   NEURO: Pt denies: seizures, strokes, diplopia, weakness, paraesthesias, or paralysis.   SKIN: Pt denies: itching, rashes, discoloration, or specific lesions of concern. Denies recent hair loss.   PSYCH: The patient denies significant depression, anxiety, mood imbalance. Specifically denies any suicidal ideation.        Examination    /84   Pulse 86   Temp 97.9  F (36.6  C) (Temporal)   Resp 16   Wt 86.3 kg (190 lb 4 oz)   SpO2 98%   BMI 26.53 kg/m      Constitutional: The patient appears to be in no acute distress. The patient appears to be adequately hydrated. No acute respiratory or hemodynamic distress is noted at this time.   LUNGS: clear bilaterally, airflow is brisk, no intercostal retraction or stridor is noted. No coughing is noted during visit.   HEART:  regular without rubs, clicks, gallops, or murmurs. PMI is nondisplaced. Upstrokes are brisk. S1,S2 are heard.   GI: Abdomen is soft, without rebound, guarding or tenderness. Bowel sounds are appropriate. No renal bruits are heard.  No hernia can be discerned on palpation at this time.   NEURO: Pt is alert and appropriate. No neurologic lateralization is noted. Cranial nerves 2-12 are intact. Peripheral sensory and motor function are grossly normal.    SKIN:  warm and dry. No erythema, or rashes are noted. No specific lesions of concern are noted.    PSYCH: The patient appears grossly appropriate. Maintains good eye contact, does not have any jittery or atypical motion.  Displays appropriate affect.   ENT: Pharynx is non-erythemous, minimal PND, no significant nasal obstruction, TM's not red or retracted, hearing intact bilaterally. No carotid bruits are heard. No JVD seen. Thyroid is not nodular or enlarged.       Decision Making       1. SOB (shortness of breath)  We will set up pulmonary function studies including volumes.  Start empirically on albuterol.  Instructed not to use the morning of the PFT  Obtain chest x-ray to rule out industrial exposure.  - General PFT Lab (Please always keep checked); Future  - Pulmonary Function Test; Future  - albuterol (PROAIR HFA/PROVENTIL HFA/VENTOLIN HFA) 108 (90 Base) MCG/ACT inhaler; Inhale 2 puffs into the lungs every 6 hours  Dispense: 1 Inhaler; Refill: 0  - XR Chest 2 Views; Future    2. Groin pain, chronic, left  We will set up with general surgery.  We will try to see surgeon who did previous surgery for follow-up  - GENERAL SURG ADULT REFERRAL; Future                                 FOLLOW UP   I have asked the patient to make an appointment for followup with me following the pulmonary function study already will follow up with his primary care provider if he chooses.            I have carefully explained the diagnosis and treatment options to the patient.  The patient has displayed an understanding of the above, and all subsequent questions were answered.      DO LEYLA Garza    Portions of this note were produced using [employer]  Although every attempt at real-time proof reading has been made, occasional grammar/syntax errors may have been missed.

## 2020-12-30 ENCOUNTER — HOSPITAL ENCOUNTER (OUTPATIENT)
Dept: RESPIRATORY THERAPY | Facility: CLINIC | Age: 45
Discharge: HOME | End: 2020-12-30
Attending: INTERNAL MEDICINE | Admitting: INTERNAL MEDICINE
Payer: COMMERCIAL

## 2020-12-30 DIAGNOSIS — R06.02 SOB (SHORTNESS OF BREATH): ICD-10-CM

## 2020-12-30 PROCEDURE — 94060 EVALUATION OF WHEEZING: CPT

## 2020-12-30 PROCEDURE — 250N000009 HC RX 250: Performed by: INTERNAL MEDICINE

## 2020-12-30 PROCEDURE — 94726 PLETHYSMOGRAPHY LUNG VOLUMES: CPT

## 2020-12-30 PROCEDURE — 94729 DIFFUSING CAPACITY: CPT

## 2020-12-30 RX ORDER — ALBUTEROL SULFATE 0.83 MG/ML
2.5 SOLUTION RESPIRATORY (INHALATION)
Status: COMPLETED | OUTPATIENT
Start: 2020-12-30 | End: 2020-12-30

## 2020-12-30 RX ADMIN — ALBUTEROL SULFATE 2.5 MG: 2.5 SOLUTION RESPIRATORY (INHALATION) at 09:47

## 2020-12-30 ASSESSMENT — ASTHMA QUESTIONNAIRES: ACT_TOTALSCORE: 18

## 2020-12-30 NOTE — PROGRESS NOTES
Pre-Bronch    Post-Bronch         Actual Pred %Pred  Actual %Pred %Chng       ---- SPIROMETRY ----                          FVC (L) 5.57 5.28 105   5.59 105 +0            FEV1 (L) 4.54 4.18 108   4.66 111 +2            FEV1/FVC (%) 82 80     83   +2            FEV1/SVC (%) 80 76                      FEV1/FEV6 (%) 82 81     83   +1            FEF Max (L/sec) 10.85 10.22 106   11.73 114 +8            FEF 25-75% (L/sec) 4.51 3.89 115   5.10 130 +12            FIVC (L) 4.38       4.63   +5            FIF Max (L/sec) 6.45 8.32 77   8.03 96 +24            Expiratory Time (sec) 6.74       6.92   +2            ----LUNG MECHANICS                           MVV (L/min)   161                      MEP (cmH2O)                          MIP (cmH2O)                          ---- LUNG VOLUMES ----                          SVC (L) 5.69 5.49 103                    IC (L) 4.59 3.96 115                    ERV (L) 1.10 1.53 71                    FRC(Pleth) (L) 3.29 3.54 92                    RV (Pleth) (L) 2.19 2.14 102                    TLC (Pleth) (L) 7.88 7.33 107                    RV/TLC (Pleth) (%) 28 32                      ---- DIFFUSION ----                          DLCOunc (ml/min/mmHg) 29.14 30.98 94                    DLCOcor (ml/min/mmHg)   30.98                      DL/VA (ml/min/mmHg/L) 4.06 4.50                      VA (L) 7.18 6.89 104                    IVC (L) 5.34                        Hgb (gm/dL)   12-18                      ---- BLOOD GASES ----                          FIO2 (%)                          pH   7.40                      PaCO2 (mmHg)   38-42                      PaO2 (mmHg)   89.2                      P(A-a)O2 (mmHg)                          SaO2 (%)                          HCO3 (mEq/L)                          COHb (%)   < 1.5%                      MetHgb (%)   < 1.5%                                                                                                                                                                                                                                                                                                                Pre-Bronch    Post-Bronch         Actual Pred %Pred  Actual %Pred %Chng       ---- SPIROMETRY ----                          FVC (L) 5.57 5.28 105   5.59 105 +0            FEV1 (L) 4.54 4.18 108   4.66 111 +2            FEV1/FVC (%) 82 80     83   +2            FEV1/SVC (%) 80 76                      FEV1/FEV6 (%) 82 81     83   +1            FEF Max (L/sec) 10.85 10.22 106   11.73 114 +8            FEF 25-75% (L/sec) 4.51 3.89 115   5.10 130 +12            FIVC (L) 4.38       4.63   +5            FIF Max (L/sec) 6.45 8.32 77   8.03 96 +24            Expiratory Time (sec) 6.74       6.92   +2            ----LUNG MECHANICS                           MVV (L/min)   161                      MEP (cmH2O)                          MIP (cmH2O)                          ---- LUNG VOLUMES ----                          SVC (L) 5.69 5.49 103                    IC (L) 4.59 3.96 115                    ERV (L) 1.10 1.53 71                    FRC(Pleth) (L) 3.29 3.54 92                    RV (Pleth) (L) 2.19 2.14 102                    TLC (Pleth) (L) 7.88 7.33 107                    RV/TLC (Pleth) (%) 28 32                      ---- DIFFUSION ----                          DLCOunc (ml/min/mmHg) 29.14 30.98 94                    DLCOcor (ml/min/mmHg)   30.98                      DL/VA (ml/min/mmHg/L) 4.06 4.50                      VA (L) 7.18 6.89 104                    IVC (L) 5.34                        Hgb (gm/dL)   12-18                      ---- BLOOD GASES ----                          FIO2 (%)                          pH   7.40                      PaCO2 (mmHg)   38-42                      PaO2 (mmHg)   89.2                      P(A-a)O2 (mmHg)                          SaO2 (%)                          HCO3 (mEq/L)                          COHb (%)   < 1.5%

## 2020-12-30 NOTE — DISCHARGE INSTRUCTIONS
Thank you for completing pulmonary function testing today.  All results will be scanned into your epic results for your doctor to review.  Please resume taking all your current prescribed medications and diet as directed by your provider.   If you have not heard from your provider about your testing within two weeks and do not have a follow-up appointment scheduled with them please contact your provider about any questions you have concerning your testing.   Thank you  [facility]

## 2020-12-30 NOTE — LETTER
January 7, 2021      Fredy Keys  [address]  [place] [address]        Dear ,    We are writing to inform you of your test results.    Your pulmonary function study is essentially normal.  Minimal response to the inhaler was noted.     You will be contacted with any outstanding results as they are available.   Feel free to contact me via the office or My Chart if you have any questions regarding the above.     Resulted Orders   General PFT Lab (Please always keep checked)   Result Value Ref Range    FVC-Pred 5.28 L    FVC-Pre 5.57 L    FVC-%Pred-Pre 105 %    FEV1-Pre 4.54 L    FEV1-%Pred-Pre 108 %    FEV1FVC-Pred 80 %    FEV1FVC-Pre 82 %    FEFMax-Pred 10.22 L/sec    FEFMax-Pre 10.85 L/sec    FEFMax-%Pred-Pre 106 %    FEF2575-Pred 3.89 L/sec    FEF2575-Pre 4.51 L/sec    PIC2055-%Pred-Pre 115 %    FEF2575-Post 3.95 L/sec    QPP7046-%Pred-Post 101 %    ExpTime-Pre 6.74 sec    FIFMax-Pre 6.45 L/sec    VC-Pred 5.49 L    VC-Pre 5.69 L    VC-%Pred-Pre 103 %    IC-Pred 3.96 L    IC-Pre 4.59 L    IC-%Pred-Pre 115 %    ERV-Pred 1.53 L    ERV-Pre 1.10 L    ERV-%Pred-Pre 71 %    FEV1FEV6-Pred 81 %    FEV1FEV6-Pre 82 %    FRCPleth-Pred 3.54 L    FRCPleth-Pre 3.29 L    FRCPleth-%Pred-Pre 92 %    RVPleth-Pred 2.14 L    RVPleth-Pre 2.19 L    RVPleth-%Pred-Pre 102 %    TLCPleth-Pred 7.33 L    TLCPleth-Pre 7.88 L    TLCPleth-%Pred-Pre 107 %    Gaw-Pred 1.03 L/s/cmH2O    Gaw-Pre 1.61 L/s/cmH2O    Gaw-%Pred-Pre 156 %    sGaw-Pred 0.08 1/cmH2O*s    sGaw-Pre 0.52 1/cmH2O*s    sGaw-%Pred-Pre 628 %    sRaw-Pred 4.76 cmH2O*s    sRaw-Pre 2.62 cmH2O*s    sRaw-%Pred-Pre 54 %    DLCOunc-Pred 30.98 ml/min/mmHg    DLCOunc-Pre 29.14 ml/min/mmHg    DLCOunc-%Pred-Pre 94 %    VA-Pre 7.18 L    VA-%Pred-Pre 104 %    FEV1SVC-Pred 76 %    FEV1SVC-Pre 80 %    Narrative    The FVC, FEV1, FEV1/FVC ratio and EZT42-13% are within normal limits.  The inspiratory flow rates are within normal limits.  The airway resistance is normal.  Lung  volumes are within normal limits.  Following administration of bronchodilators, there is   no significant response.  The diffusing capacity is normal.  However, the diffusing capacity was not corrected for the patient's hemoglobin.  The results are within normal limits.  IMPRESSION:  Normal Pulmonary Function  No significant change following bronchodilators but this does not rule out clinical efficacy.  No previous studies available for comparison.  Spencer Fuller MD  ____________________________________________M.D.  SPENCER FULLER    This interpretation has been electronically signed:  SPENCER FULLER 01/04/2021  03:56:09 PM         If you have any questions or concerns, please call the clinic at the number listed above.       Sincerely,      Ashok Phipps DO/ac

## 2020-12-31 ENCOUNTER — TELEPHONE (OUTPATIENT)
Dept: SURGERY | Facility: CLINIC | Age: 45
End: 2020-12-31

## 2020-12-31 NOTE — TELEPHONE ENCOUNTER
After provider reviewed chart -patient has seen by Dr. Escobar for previous surgeries, per VO Dr. Medina.  Patient called and asked if he would like to keep scheduled appointment with Dr. Medina or could we schedule him with Dr. Escobar for continuity of care.  Patient states he wasn't aware he was scheduled with Dr. Medina-patient c/o of symptoms related to previous hernia surgery.  Informed patient he should follow-up with provider who preformed surgery, patient states he is fine with that.  Offered multiple times for appointments with Dr. Escobar, patient states he will check his schedule and call back and reschedule with Dr. Escobar.      ..........Lila Barron CMA  ([facility])

## 2021-01-04 LAB
DLCOUNC-%PRED-PRE: 94 %
DLCOUNC-PRE: 29.14 ML/MIN/MMHG
DLCOUNC-PRED: 30.98 ML/MIN/MMHG
ERV-%PRED-PRE: 71 %
ERV-PRE: 1.1 L
ERV-PRED: 1.53 L
EXPTIME-PRE: 6.74 SEC
FEF2575-%PRED-POST: 101 %
FEF2575-%PRED-PRE: 115 %
FEF2575-POST: 3.95 L/SEC
FEF2575-PRE: 4.51 L/SEC
FEF2575-PRED: 3.89 L/SEC
FEFMAX-%PRED-PRE: 106 %
FEFMAX-PRE: 10.85 L/SEC
FEFMAX-PRED: 10.22 L/SEC
FEV1-%PRED-PRE: 108 %
FEV1-PRE: 4.54 L
FEV1FEV6-PRE: 82 %
FEV1FEV6-PRED: 81 %
FEV1FVC-PRE: 82 %
FEV1FVC-PRED: 80 %
FEV1SVC-PRE: 80 %
FEV1SVC-PRED: 76 %
FIFMAX-PRE: 6.45 L/SEC
FRCPLETH-%PRED-PRE: 92 %
FRCPLETH-PRE: 3.29 L
FRCPLETH-PRED: 3.54 L
FVC-%PRED-PRE: 105 %
FVC-PRE: 5.57 L
FVC-PRED: 5.28 L
GAW-%PRED-PRE: 156 %
GAW-PRE: 1.61 L/S/CMH2O
GAW-PRED: 1.03 L/S/CMH2O
IC-%PRED-PRE: 115 %
IC-PRE: 4.59 L
IC-PRED: 3.96 L
RVPLETH-%PRED-PRE: 102 %
RVPLETH-PRE: 2.19 L
RVPLETH-PRED: 2.14 L
SGAW-%PRED-PRE: 628 %
SGAW-PRE: 0.52 1/CMH2O*S
SGAW-PRED: 0.08 1/CMH2O*S
SRAW-%PRED-PRE: 54 %
SRAW-PRE: 2.62 CMH2O*S
SRAW-PRED: 4.76 CMH2O*S
TLCPLETH-%PRED-PRE: 107 %
TLCPLETH-PRE: 7.88 L
TLCPLETH-PRED: 7.33 L
VA-%PRED-PRE: 104 %
VA-PRE: 7.18 L
VC-%PRED-PRE: 103 %
VC-PRE: 5.69 L
VC-PRED: 5.49 L

## 2021-01-07 NOTE — RESULT ENCOUNTER NOTE
Dear Fredy, your recent test results are attached.    Your pulmonary function study is essentially normal.  Minimal response to the inhaler was noted.    You will be contacted with any outstanding results as they are available.  Feel free to contact me via the office or My Chart if you have any questions regarding the above.

## 2021-01-07 NOTE — RESULT ENCOUNTER NOTE
Dear Fredy, your recent test results are attached.    X-ray of the chest is normal.    You will be contacted with any outstanding results as they are available.  Feel free to contact me via the office or My Chart if you have any questions regarding the above.

## 2021-03-19 ENCOUNTER — TELEPHONE (OUTPATIENT)
Dept: FAMILY MEDICINE | Facility: CLINIC | Age: 46
End: 2021-03-19

## 2021-03-19 DIAGNOSIS — B07.0 PLANTAR WARTS: Primary | ICD-10-CM

## 2021-03-19 NOTE — TELEPHONE ENCOUNTER
Patient calling and wanting to request a prescription for a wart treatment for planta warts. Patient stating he previously received the treatment cream from an urgent care provider in [place]. Further questioning patient stated it was a compound treatment. Instructed patient to stop at the [place] pharmacy with the container to see if this is something they can supply and if so send the request over to Dr. Estrada for filling. Adriana Jaime LPN

## 2021-03-19 NOTE — TELEPHONE ENCOUNTER
Have medication pended in, that was only one available please look at to see if correct with med below from faxed rx.  Received a fax from [facility] pharmacy saying C-Wartpeel in Remedium (R) 17%-2% Gel.  Directions Apply to wart nightly allow to dry and cover with tape- remove in the morning.     Ondina Negrete MA 3/19/2021  11:48 AM

## 2021-03-24 NOTE — TELEPHONE ENCOUNTER
Pharmacy was informed that it was wart peel that is requested.    Yue Bee, [facility]

## 2021-03-24 NOTE — TELEPHONE ENCOUNTER
Pharmacy has requested wart peel compound.  They are wondering if he wanted compound W. Or did he want to refill wart peel.    Please call   [facility] pharmacy  Ph# [phone]    Milagros Luo RN on 3/24/2021 at 5:12 PM

## 2021-03-24 NOTE — TELEPHONE ENCOUNTER
Routing refill request to provider for review/approval because:  Drug not on the [facility] refill protocol   T'd up 14g and 1 refill  for provider review.    Requested Prescriptions   Pending Prescriptions Disp Refills     salicylic acid (COMPOUND W MAX STRENGTH) 17 % external gel 14 g 1     Sig: Apply topically daily       There is no refill protocol information for this order      Last Written Prescription Date:  NA  Last Fill Quantity: NA,  # refills: NA   Last office visit: 12/29/2020   Future Office Visit:      Please see noted documentation below.  Patient is calling requesting update and refill on this medication    Kylie Lopez RN

## 2021-04-16 ENCOUNTER — OFFICE VISIT (OUTPATIENT)
Dept: FAMILY MEDICINE | Facility: CLINIC | Age: 46
End: 2021-04-16
Payer: COMMERCIAL

## 2021-04-16 VITALS
HEART RATE: 74 BPM | SYSTOLIC BLOOD PRESSURE: 132 MMHG | RESPIRATION RATE: 14 BRPM | DIASTOLIC BLOOD PRESSURE: 80 MMHG | OXYGEN SATURATION: 100 % | TEMPERATURE: 97.7 F

## 2021-04-16 DIAGNOSIS — J01.00 ACUTE NON-RECURRENT MAXILLARY SINUSITIS: Primary | ICD-10-CM

## 2021-04-16 LAB
DEPRECATED S PYO AG THROAT QL EIA: NEGATIVE
SPECIMEN SOURCE: NORMAL
SPECIMEN SOURCE: NORMAL
STREP GROUP A PCR: NOT DETECTED

## 2021-04-16 PROCEDURE — 87651 STREP A DNA AMP PROBE: CPT | Performed by: FAMILY MEDICINE

## 2021-04-16 PROCEDURE — U0003 INFECTIOUS AGENT DETECTION BY NUCLEIC ACID (DNA OR RNA); SEVERE ACUTE RESPIRATORY SYNDROME CORONAVIRUS 2 (SARS-COV-2) (CORONAVIRUS DISEASE [COVID-19]), AMPLIFIED PROBE TECHNIQUE, MAKING USE OF HIGH THROUGHPUT TECHNOLOGIES AS DESCRIBED BY CMS-2020-01-R: HCPCS | Performed by: FAMILY MEDICINE

## 2021-04-16 PROCEDURE — U0005 INFEC AGEN DETEC AMPLI PROBE: HCPCS | Performed by: FAMILY MEDICINE

## 2021-04-16 PROCEDURE — 99213 OFFICE O/P EST LOW 20 MIN: CPT | Performed by: FAMILY MEDICINE

## 2021-04-16 PROCEDURE — 99N1174 PR STATISTIC STREP A RAPID: Performed by: FAMILY MEDICINE

## 2021-04-16 NOTE — PROGRESS NOTES
Assessment & Plan   1. Acute non-recurrent maxillary sinusitis: Treat with Augmentin twice daily for 10 days.  Has a Z-Markus allergy.  Strep Covid test pending.  Patient agreeable plan.  Encourage [place] pot and Flonase as well.  Follow-up in 1 week if not improving.  - amoxicillin-clavulanate (AUGMENTIN) 875-125 MG tablet; Take 1 tablet by mouth 2 times daily for 10 days  Dispense: 20 tablet; Refill: 0  - Streptococcus A Rapid Scr w Reflx to PCR  - Symptomatic COVID-19 Virus (Coronavirus) by PCR; Future  - Group A Streptococcus PCR Throat Swab  - Symptomatic COVID-19 Virus (Coronavirus) by PCR    Return in about 1 week (around 4/23/2021), or if symptoms worsen or fail to improve.    Lalito Ahuja MD  [facility]     Fredy Keys is a 45 year old male who presents to clinic today for the following health issues:    HPI     Acute Illness  Acute illness concerns: Sinus problem  Onset/Duration: Tuesday  Symptoms:  Fever: no  Chills/Sweats: no  Headache (location?): no  Sinus Pressure: YES  Conjunctivitis:  no  Ear Pain: no  Rhinorrhea: YES  Congestion: YES  Sore Throat: YES  Cough: no  Wheeze: no  Decreased Appetite: no  Nausea: no  Vomiting: no  Diarrhea: no  Dysuria/Freq.: no  Dysuria or Hematuria: no  Fatigue/Achiness: no  Sick/Strep Exposure: no  Therapies tried and outcome: Sudafed, vitamin C drops, Black elderberry syrup    Patient notes bilateral maxillary sinus pain and pressure.  Initially started with a sore throat and postnasal drip and has progressed to rhinorrhea and nasal congestion.  Ears also feel plugged; however, no drainage.  Denies any fever, chills.  No known sick contacts.  Has tried over-the-counter above therapies without much improvement.    Has seasonal allergies typically in the spring and fall.  Worse in the spring.  Works as the .    Review of Systems   Constitutional, HEENT, lymph, Derm, MSK, cardiovascular, pulmonary, gi and  gu systems are negative, except as otherwise noted.      Objective    /80   Pulse 74   Temp 97.7  F (36.5  C)   Resp 14   SpO2 100%   There is no height or weight on file to calculate BMI.  Physical Exam   General: Appears well and in no acute distress.  HEENT: Eyes grossly normal to inspection. Extraocular movements intact. Pupils equal, round, and reactive to light. Mucous membranes moist. No ulcers or lesions noted in the oropharynx.  TMs and ear canals normal.  Heme/Lymph: No supraclavicular, anterior, or posterior cervical lymphadenopathy.   Cardiovascular: Regular rate and rhythm, normal S1 and S2 without murmur. No extra heartsounds or friction rub. Radial pulses present and equal bilaterally.  Respiratory: Lungs clear to auscultation bilaterally. No wheezing or crackles. No prolonged expiration. Symmetrical chest rise.  Musculoskeletal: No gross extremity deformities. No peripheral edema. Normal muscle bulk.     Covid and strep test pending

## 2021-04-17 LAB
SARS-COV-2 RNA RESP QL NAA+PROBE: NOT DETECTED
SPECIMEN SOURCE: NORMAL

## 2021-04-19 ENCOUNTER — TELEPHONE (OUTPATIENT)
Dept: FAMILY MEDICINE | Facility: CLINIC | Age: 46
End: 2021-04-19

## 2021-04-19 NOTE — LETTER
April 20, 2021      Fredy Keys  [address] [facility] [address]              Dear Fredy,    Doroteo Covid test was negative     Sincerely,    Lalito Ahuja MD

## 2021-04-19 NOTE — TELEPHONE ENCOUNTER
----- Message from Lalito Ahuja MD sent at 4/19/2021  7:00 AM CDT -----  Please call with results.     Your COVID lab results came back normal.    Please call the clinic with any questions you may have.     Have a great day,    Dr. Powell

## 2021-04-19 NOTE — PATIENT INSTRUCTIONS
Patient Education     Understanding Acute Rhinosinusitis  Acute rhinosinusitis is when the lining of the inside of the nose and the sinuses becomes irritated and swollen. It is also called sinusitis, or a sinus infection.  Sinuses are air-filled spaces in the skull behind the face. They are kept moist and clean by a lining of mucosa. Things such as pollen, smoke, and chemical fumes can irritate the mucosa. It can then swell up. As a response to irritation, the mucosa makes more mucus and other fluids. Tiny hairlike cilia cover the mucosa. Cilia help carry mucus toward the opening of the sinus. Too much mucus may cause the cilia to stop working. This blocks the sinus opening. A buildup of fluid in the sinuses then causes pain and pressure. It can also cause bacteria to grow in the sinuses.    What causes acute rhinosinusitis?  A sinus infection is most often caused by a virus. You are more likely to get one after having a cold or the flu. In some cases, a sinus infection can be caused by bacteria.  You are at higher risk for a sinus infection if you:    Are older in age    Have structural problems with your sinuses    Smoke or are exposed to secondhand smoke    Are exposed to changes in pressure, such as from flying a lot or deep sea diving    Have asthma or allergies    Have a weak immune system    Have dental disease     Symptoms of acute rhinosinusitis  Symptoms of acute rhinosinusitis often last around 7 to 10 days. If you have a bacterial infection, they may last longer. They may also get better but then worsen. You may have:    Face pain or pressure under the eyes and around the nose    Headache    Fluid draining in the back of the throat (postnasal drip)    Congestion    Drainage that is thick and colored (often green), instead of clear    Cough    Problems with your sense of smell    Ear pain or hearing problems    Fever    Tooth pain    Fatigue  Diagnosing acute rhinosinusitis  Your healthcare provider  will ask about your symptoms and past health. He or she will look at your ears, nose, throat, and sinuses. Imaging tests, such as X-rays, are often not needed.  It can be hard to figure out if a sinus infection is caused by a virus or bacterium. A bacterial infection tends to last longer. Symptoms may also get better but then worsen. Your healthcare provider may take a sample of mucus from your nose to check for bacteria.  Treating acute rhinosinusitis  Most sinus infections will go away within 10 days. Your body will fight off the virus. If your symptoms seem to get better but then worsen, you may have a bacterial infection instead. Your healthcare provider will then give you antibiotics. Take this medicine until it is gone, even if you feel better.  To help ease your symptoms, your healthcare provider may advise:    Over-the-counter pain relievers. Medicines such as acetaminophen or ibuprofen can ease sinus pain. They may also lower a fever.    Nasal washes. Washing your nasal passages with salt water may ease pain and pressure. It can rinse out mucous and other irritants from your sinuses. Your healthcare provider can show you how to do it.    Nasal steroid spray. This prescription medicine can reduce inflammation in your sinuses.    Other medicines. Decongestants, antihistamines, and other nasal sprays may give short-term relief. They may help with congestion. Talk with your healthcare provider before taking these medicines.     Preventing acute rhinosinusitis  You can help prevent a sinus infection with these steps:    Wash your hands well and often.    Stay away from people who have a cold or upper respiratory infection.    Don't smoke. And stay away from secondhand smoke.    Use a humidifier at home.    Make sure you are up-to-date on your vaccines, such as the flu shot.     When to call your healthcare provider  Call your healthcare provider right away if you have any of these:    Fever of 100.4 F (38 C) or  higher, or as directed by your healthcare provider    Pain that gets worse    Symptoms that don t get better, or get worse    New symptoms  Fernando last reviewed this educational content on 6/1/2019 2000-2021 [employer]. All rights reserved. This information is not intended as a substitute for professional medical care. Always follow your healthcare professional's instructions.

## 2021-06-29 DIAGNOSIS — H10.13 ALLERGIC CONJUNCTIVITIS, BILATERAL: ICD-10-CM

## 2021-06-29 DIAGNOSIS — J31.0 CHRONIC RHINITIS: ICD-10-CM

## 2021-06-29 DIAGNOSIS — R06.02 SOB (SHORTNESS OF BREATH): ICD-10-CM

## 2021-06-29 RX ORDER — OLOPATADINE HYDROCHLORIDE 2 MG/ML
SOLUTION/ DROPS OPHTHALMIC
Qty: 7.5 ML | Refills: 11 | Status: SHIPPED | OUTPATIENT
Start: 2021-06-29 | End: 2023-03-16

## 2021-06-29 RX ORDER — AZELASTINE 1 MG/ML
SPRAY, METERED NASAL
Qty: 90 ML | Refills: 4 | Status: SHIPPED | OUTPATIENT
Start: 2021-06-29 | End: 2023-03-16

## 2021-06-29 NOTE — TELEPHONE ENCOUNTER
Patient is stopping in stating his insurance will be no longer active after today. Asking if there can please be a rush order on all of his meds that he is requesting. Wondering if maybe Dr. Cee could take care of this for him. He uses [facility] pharmacy. Please call him back with any questions. It is OK to leave a detailed message in his voicemail if he does not answer.    Thank you,  Shweta Moncada   for [facility]

## 2021-06-30 RX ORDER — ALBUTEROL SULFATE 90 UG/1
AEROSOL, METERED RESPIRATORY (INHALATION)
Qty: 18 G | Refills: 0 | Status: SHIPPED | OUTPATIENT
Start: 2021-06-30 | End: 2023-03-16

## 2021-06-30 NOTE — TELEPHONE ENCOUNTER
"ALBUTERAL Inhaler  Last Written Prescription Date:  12/29/20  Last Fill Quantity: 1,  # refills: 0   Last office visit: 12/29/2020 with prescribing provider:  Dr. Phipps - pt was to Follow-up in 2 weeks to review PFT. Provider sent letter with results.   Future Office Visit: NONE   Requested Prescriptions   Pending Prescriptions Disp Refills     VENTOLIN  (90 Base) MCG/ACT inhaler [Pharmacy Med Name: VENTOLIN HFA 108MCG/ACT AERS]  0     Sig: INHALE 2 PUFFS INTO THE LUNGS EVERY 6 HOURS       Asthma Maintenance Inhalers - Anticholinergics Passed - 6/29/2021  9:57 AM        Passed - Patient is age 12 years or older        Passed - Recent (12 mo) or future (30 days) visit within the authorizing provider's specialty     Patient has had an office visit with the authorizing provider or a provider within the authorizing providers department within the previous 12 mos or has a future within next 30 days. See \"Patient Info\" tab in inbasket, or \"Choose Columns\" in Meds & Orders section of the refill encounter.              Passed - Medication is active on med list       Short-Acting Beta Agonist Inhalers Protocol  Passed - 6/29/2021  9:57 AM        Passed - Patient is age 12 or older        Passed - Recent (12 mo) or future (30 days) visit within the authorizing provider's specialty     Patient has had an office visit with the authorizing provider or a provider within the authorizing providers department within the previous 12 mos or has a future within next 30 days. See \"Patient Info\" tab in inbasket, or \"Choose Columns\" in Meds & Orders section of the refill encounter.              Passed - Medication is active on med list           Routing refill request to provider for review/approval because:  A break in medication  LEOLA Clay                "

## 2022-10-28 ENCOUNTER — HOSPITAL ENCOUNTER (OUTPATIENT)
Dept: ULTRASOUND IMAGING | Facility: CLINIC | Age: 47
Discharge: HOME | End: 2022-10-28
Attending: SURGERY | Admitting: SURGERY
Payer: COMMERCIAL

## 2022-10-28 ENCOUNTER — OFFICE VISIT (OUTPATIENT)
Dept: SURGERY | Facility: CLINIC | Age: 47
End: 2022-10-28
Payer: COMMERCIAL

## 2022-10-28 VITALS
BODY MASS INDEX: 25.62 KG/M2 | WEIGHT: 183 LBS | SYSTOLIC BLOOD PRESSURE: 132 MMHG | HEIGHT: 71 IN | TEMPERATURE: 98.1 F | DIASTOLIC BLOOD PRESSURE: 84 MMHG

## 2022-10-28 DIAGNOSIS — R10.32 INGUINAL PAIN OF BOTH SIDES: ICD-10-CM

## 2022-10-28 DIAGNOSIS — R10.32 INGUINAL PAIN OF BOTH SIDES: Primary | ICD-10-CM

## 2022-10-28 DIAGNOSIS — R10.31 INGUINAL PAIN OF BOTH SIDES: Primary | ICD-10-CM

## 2022-10-28 DIAGNOSIS — R10.31 INGUINAL PAIN OF BOTH SIDES: ICD-10-CM

## 2022-10-28 PROCEDURE — 99203 OFFICE O/P NEW LOW 30 MIN: CPT | Performed by: SURGERY

## 2022-10-28 PROCEDURE — 76705 ECHO EXAM OF ABDOMEN: CPT

## 2022-10-28 NOTE — PROGRESS NOTES
Patient seen in consultation for left groin pain    HPI:  Patient is a 47 year old male with history of prior laparoscopic left inguinal hernia repair with mesh in 2020 with Dr. Escobar.  Recently he reports some burning pain in his left inguinal area.  He also thinks he has felt a palpable abnormality in the groin area but does not think it is an exact same spot as it was previously.  He denies obstructive symptoms.  He not a smoker and not a diabetic.  He denies symptoms on the right.  No other abdominal surgeries    Review Of Systems    Skin: negative  Ears/Nose/Throat: negative  Respiratory: No shortness of breath, dyspnea on exertion, cough, or hemoptysis  Cardiovascular: negative  Gastrointestinal: negative  Genitourinary: negative  Musculoskeletal: negative  Neurologic: negative  Hematologic/Lymphatic/Immunologic: negative  Endocrine: negative      Past Medical History:   Diagnosis Date     ALLERGIC RHINITIS NOS 8/24/2006       Past Surgical History:   Procedure Laterality Date     COLONOSCOPY N/A 10/2/2020    Procedure: Colonoscopy with possible biopsy and/or polypectomy;  Surgeon: Merrill Briggs MD;  Location:  GI     ENT SURGERY       EXCISE MASS BACK N/A 6/29/2020    Procedure: Excision back masses;  Surgeon: Trell Escobar MD;  Location: [facility]     EXCISE MASS LOWER EXTREMITY Left 6/29/2020    Procedure: Excision left leg mass;  Surgeon: Trell Escobar MD;  Location: [facility]     LAPAROSCOPIC HERNIORRHAPHY INGUINAL Left 3/3/2020    Procedure: Laparoscopic left inguinal hernia repair;  Surgeon: Trell Escobar MD;  Location: [facility]       Family History   Problem Relation Age of Onset     Genitourinary Problems Father         kidney disease?     Circulatory Father         Factor 5      Thyroid Disease Mother         thyroid cancer     Osteoporosis Mother      Diabetes Paternal Grandmother        Social History     Socioeconomic History     Marital status:      Spouse name: Linn      Number of children: Not on file     Years of education: Not on file     Highest education level: Not on file   Occupational History     Not on file   Tobacco Use     Smoking status: Never     Smokeless tobacco: Former     Types: Chew     Tobacco comments:     no smoking in the home.  1 tin/week in summer, 1 every other month in winter   Substance and Sexual Activity     Alcohol use: Yes     Alcohol/week: 0.0 standard drinks     Comment: 4-5  drinks weekly     Drug use: No     Sexual activity: Yes     Partners: Female   Other Topics Concern     Parent/sibling w/ CABG, MI or angioplasty before 65F 55M? No   Social History Narrative     Not on file     Social Determinants of Health     Financial Resource Strain: Not on file   Food Insecurity: Not on file   Transportation Needs: Not on file   Physical Activity: Not on file   Stress: Not on file   Social Connections: Not on file   Intimate Partner Violence: Not on file   Housing Stability: Not on file       Current Outpatient Medications   Medication Sig Dispense Refill     azelastine (ASTELIN) 0.1 % nasal spray SPRAY 2 SPRAYS INTO BOTH NOSTRILS TWO TIMES A DAY 90 mL 4     fluticasone (FLONASE) 50 MCG/ACT nasal spray USE ONE TO TWO SPRAYS IN EACH NOSTRIL EVERY DAY 48 g 3     HEMP OIL OR EXTRACT OR OTHER CBD CANNABINOID, NOT MEDICAL CANNABIS, 0.25 mLs       montelukast (SINGULAIR) 10 MG tablet Take 1 tablet (10 mg) by mouth At Bedtime (Patient not taking: Reported on 4/16/2021) 90 tablet 4     olopatadine (PATADAY) 0.2 % ophthalmic solution APPLY ONE DROP TO EYE DAILY 7.5 mL 11     omeprazole (PRILOSEC) 40 MG DR capsule Take 1 capsule (40 mg) by mouth daily 90 capsule 3     salicylic acid (COMPOUND W MAX STRENGTH) 17 % external gel Apply topically daily 14 g 1     VENTOLIN  (90 Base) MCG/ACT inhaler INHALE 2 PUFFS INTO THE LUNGS EVERY 6 HOURS 18 g 0       Medications and history reviewed    Physical exam:  Vitals: /84   Temp 98.1  F (36.7  C) (Temporal)    "Ht 1.803 m (5' 11\")   Wt 83 kg (183 lb)   BMI 25.52 kg/m    BMI= Body mass index is 25.52 kg/m .    Constitutional: Healthy, alert, non-distressed   Head: Normo-cephalic, atraumatic, no lesions, masses or tenderness   Cardiovascular: RRR, no new murmurs, +S1, +S2 heart sounds, no clicks, rubs or gallops   Respiratory: CTAB, no rales, rhonchi or wheezing, equal chest rise, good respiratory effort   Gastrointestinal: Soft, non-tender, non distended, no rebound rigidity or guarding, no masses or hernias palpated   Groin: Left inguinal area without obvious palpable abnormality with and without Valsalva.  Right groin with subtle protrusion with Valsalva.  : Deferred  Musculoskeletal: Moves all extremities, normal  strength, no deformities noted   Skin: No suspicious lesions or rashes   Psychiatric: Mentation appears normal, affect appropriate   Hematologic/Lymphatic/Immunologic: Normal cervical and supraclavicular lymph nodes   Patient able to get up on table without difficulty.    Labs show:  No results found for this or any previous visit (from the past 24 hour(s)).    Imaging shows:  No results found for this or any previous visit (from the past 744 hour(s)).     Assessment:     ICD-10-CM    1. Inguinal pain of both sides  R10.31 US Hernia Evaluation    R10.32         Plan: I cannot confirm recurrent left inguinal hernia with history and physical exam so I recommend ultrasound hernia evaluation of bilateral inguinal areas since I felt a palpable abnormality on the right.  We discussed many possible plans depending on the findings of the imaging study.  I will call him to discuss after study is complete.    30 minutes spent on the date of the encounter doing chart review, history and exam, documentation and further activities per the note    Cecilio Medina, DO    "

## 2022-10-28 NOTE — LETTER
10/28/2022         RE: Fredy Keys  [address] [address] [place] [id number]        Dear Colleague,    Thank you for referring your patient, Fredy Keys, to the [facility]. Please see a copy of my visit note below.    Patient seen in consultation for left groin pain    HPI:  Patient is a 47 year old male with history of prior laparoscopic left inguinal hernia repair with mesh in 2020 with Dr. Escobar.  Recently he reports some burning pain in his left inguinal area.  He also thinks he has felt a palpable abnormality in the groin area but does not think it is an exact same spot as it was previously.  He denies obstructive symptoms.  He not a smoker and not a diabetic.  He denies symptoms on the right.  No other abdominal surgeries    Review Of Systems    Skin: negative  Ears/Nose/Throat: negative  Respiratory: No shortness of breath, dyspnea on exertion, cough, or hemoptysis  Cardiovascular: negative  Gastrointestinal: negative  Genitourinary: negative  Musculoskeletal: negative  Neurologic: negative  Hematologic/Lymphatic/Immunologic: negative  Endocrine: negative      Past Medical History:   Diagnosis Date     ALLERGIC RHINITIS NOS 8/24/2006       Past Surgical History:   Procedure Laterality Date     COLONOSCOPY N/A 10/2/2020    Procedure: Colonoscopy with possible biopsy and/or polypectomy;  Surgeon: Merrill Briggs MD;  Location:  GI     ENT SURGERY       EXCISE MASS BACK N/A 6/29/2020    Procedure: Excision back masses;  Surgeon: Trell Escobar MD;  [facility]     EXCISE MASS LOWER EXTREMITY Left 6/29/2020    Procedure: Excision left leg mass;  Surgeon: Trell Escobar MD;  Location[facility]     LAPAROSCOPIC HERNIORRHAPHY INGUINAL Left 3/3/2020    Procedure: Laparoscopic left inguinal hernia repair;  Surgeon: Trell Escobar MD;  Location: [facility]       Family History   Problem Relation Age of Onset     Genitourinary Problems Father         kidney disease?      Circulatory Father         Factor 5      Thyroid Disease Mother         thyroid cancer     Osteoporosis Mother      Diabetes Paternal Grandmother        Social History     Socioeconomic History     Marital status:      Spouse name: Linn     Number of children: Not on file     Years of education: Not on file     Highest education level: Not on file   Occupational History     Not on file   Tobacco Use     Smoking status: Never     Smokeless tobacco: Former     Types: Chew     Tobacco comments:     no smoking in the home.  1 tin/week in summer, 1 every other month in winter   Substance and Sexual Activity     Alcohol use: Yes     Alcohol/week: 0.0 standard drinks     Comment: 4-5  drinks weekly     Drug use: No     Sexual activity: Yes     Partners: Female   Other Topics Concern     Parent/sibling w/ CABG, MI or angioplasty before 65F 55M? No   Social History Narrative     Not on file     Social Determinants of Health     Financial Resource Strain: Not on file   Food Insecurity: Not on file   Transportation Needs: Not on file   Physical Activity: Not on file   Stress: Not on file   Social Connections: Not on file   Intimate Partner Violence: Not on file   Housing Stability: Not on file       Current Outpatient Medications   Medication Sig Dispense Refill     azelastine (ASTELIN) 0.1 % nasal spray SPRAY 2 SPRAYS INTO BOTH NOSTRILS TWO TIMES A DAY 90 mL 4     fluticasone (FLONASE) 50 MCG/ACT nasal spray USE ONE TO TWO SPRAYS IN EACH NOSTRIL EVERY DAY 48 g 3     HEMP OIL OR EXTRACT OR OTHER CBD CANNABINOID, NOT MEDICAL CANNABIS, 0.25 mLs       montelukast (SINGULAIR) 10 MG tablet Take 1 tablet (10 mg) by mouth At Bedtime (Patient not taking: Reported on 4/16/2021) 90 tablet 4     olopatadine (PATADAY) 0.2 % ophthalmic solution APPLY ONE DROP TO EYE DAILY 7.5 mL 11     omeprazole (PRILOSEC) 40 MG DR capsule Take 1 capsule (40 mg) by mouth daily 90 capsule 3     salicylic acid (COMPOUND W MAX STRENGTH) 17 %  "external gel Apply topically daily 14 g 1     VENTOLIN  (90 Base) MCG/ACT inhaler INHALE 2 PUFFS INTO THE LUNGS EVERY 6 HOURS 18 g 0       Medications and history reviewed    Physical exam:  Vitals: /84   Temp 98.1  F (36.7  C) (Temporal)   Ht 1.803 m (5' 11\")   Wt 83 kg (183 lb)   BMI 25.52 kg/m    BMI= Body mass index is 25.52 kg/m .    Constitutional: Healthy, alert, non-distressed   Head: Normo-cephalic, atraumatic, no lesions, masses or tenderness   Cardiovascular: RRR, no new murmurs, +S1, +S2 heart sounds, no clicks, rubs or gallops   Respiratory: CTAB, no rales, rhonchi or wheezing, equal chest rise, good respiratory effort   Gastrointestinal: Soft, non-tender, non distended, no rebound rigidity or guarding, no masses or hernias palpated   Groin: Left inguinal area without obvious palpable abnormality with and without Valsalva.  Right groin with subtle protrusion with Valsalva.  : Deferred  Musculoskeletal: Moves all extremities, normal  strength, no deformities noted   Skin: No suspicious lesions or rashes   Psychiatric: Mentation appears normal, affect appropriate   Hematologic/Lymphatic/Immunologic: Normal cervical and supraclavicular lymph nodes   Patient able to get up on table without difficulty.    Labs show:  No results found for this or any previous visit (from the past 24 hour(s)).    Imaging shows:  No results found for this or any previous visit (from the past 744 hour(s)).     Assessment:     ICD-10-CM    1. Inguinal pain of both sides  R10.31 US Hernia Evaluation    R10.32         Plan: I cannot confirm recurrent left inguinal hernia with history and physical exam so I recommend ultrasound hernia evaluation of bilateral inguinal areas since I felt a palpable abnormality on the right.  We discussed many possible plans depending on the findings of the imaging study.  I will call him to discuss after study is complete.    30 minutes spent on the date of the encounter doing " chart review, history and exam, documentation and further activities per the note    Cecilio Medina, DO        Again, thank you for allowing me to participate in the care of your patient.        Sincerely,        Cecilio Medina, DO

## 2022-11-04 ENCOUNTER — TELEPHONE (OUTPATIENT)
Dept: SURGERY | Facility: CLINIC | Age: 47
End: 2022-11-04

## 2023-03-16 ENCOUNTER — OFFICE VISIT (OUTPATIENT)
Dept: FAMILY MEDICINE | Facility: CLINIC | Age: 48
End: 2023-03-16
Payer: COMMERCIAL

## 2023-03-16 VITALS
SYSTOLIC BLOOD PRESSURE: 120 MMHG | OXYGEN SATURATION: 97 % | BODY MASS INDEX: 25.9 KG/M2 | DIASTOLIC BLOOD PRESSURE: 78 MMHG | WEIGHT: 185 LBS | HEART RATE: 78 BPM | TEMPERATURE: 98.6 F | HEIGHT: 71 IN

## 2023-03-16 DIAGNOSIS — L29.0 RECTAL ITCHING: ICD-10-CM

## 2023-03-16 DIAGNOSIS — Z79.1 NSAID LONG-TERM USE: ICD-10-CM

## 2023-03-16 DIAGNOSIS — Z13.1 SCREENING FOR DIABETES MELLITUS: ICD-10-CM

## 2023-03-16 DIAGNOSIS — J30.1 CHRONIC SEASONAL ALLERGIC RHINITIS DUE TO POLLEN: ICD-10-CM

## 2023-03-16 DIAGNOSIS — Z00.00 ROUTINE GENERAL MEDICAL EXAMINATION AT A HEALTH CARE FACILITY: Primary | ICD-10-CM

## 2023-03-16 DIAGNOSIS — B37.2 YEAST INFECTION OF THE SKIN: ICD-10-CM

## 2023-03-16 DIAGNOSIS — R79.89 ELEVATED SERUM CREATININE: ICD-10-CM

## 2023-03-16 DIAGNOSIS — H10.13 ALLERGIC CONJUNCTIVITIS, BILATERAL: ICD-10-CM

## 2023-03-16 DIAGNOSIS — J31.0 CHRONIC RHINITIS: ICD-10-CM

## 2023-03-16 PROBLEM — K40.90 LEFT INGUINAL HERNIA: Status: RESOLVED | Noted: 2020-02-03 | Resolved: 2023-03-16

## 2023-03-16 PROBLEM — K85.80 OTHER ACUTE PANCREATITIS, UNSPECIFIED COMPLICATION STATUS: Status: RESOLVED | Noted: 2017-12-18 | Resolved: 2023-03-16

## 2023-03-16 LAB
ANION GAP SERPL CALCULATED.3IONS-SCNC: 10 MMOL/L (ref 7–15)
BUN SERPL-MCNC: 23.1 MG/DL (ref 6–20)
CALCIUM SERPL-MCNC: 9.3 MG/DL (ref 8.6–10)
CHLORIDE SERPL-SCNC: 102 MMOL/L (ref 98–107)
CREAT SERPL-MCNC: 1.31 MG/DL (ref 0.67–1.17)
DEPRECATED HCO3 PLAS-SCNC: 28 MMOL/L (ref 22–29)
GFR SERPL CREATININE-BSD FRML MDRD: 67 ML/MIN/1.73M2
GLUCOSE SERPL-MCNC: 101 MG/DL (ref 70–99)
POTASSIUM SERPL-SCNC: 4.6 MMOL/L (ref 3.4–5.3)
SODIUM SERPL-SCNC: 140 MMOL/L (ref 136–145)

## 2023-03-16 PROCEDURE — 99396 PREV VISIT EST AGE 40-64: CPT | Performed by: FAMILY MEDICINE

## 2023-03-16 PROCEDURE — 80048 BASIC METABOLIC PNL TOTAL CA: CPT | Performed by: FAMILY MEDICINE

## 2023-03-16 PROCEDURE — 36415 COLL VENOUS BLD VENIPUNCTURE: CPT | Performed by: FAMILY MEDICINE

## 2023-03-16 RX ORDER — FLUTICASONE PROPIONATE 50 MCG
1-2 SPRAY, SUSPENSION (ML) NASAL DAILY
Qty: 48 G | Refills: 4 | Status: SHIPPED | OUTPATIENT
Start: 2023-03-16

## 2023-03-16 RX ORDER — AZELASTINE 1 MG/ML
2 SPRAY, METERED NASAL 2 TIMES DAILY
Qty: 90 ML | Refills: 4 | Status: SHIPPED | OUTPATIENT
Start: 2023-03-16

## 2023-03-16 RX ORDER — TRIAMCINOLONE ACETONIDE 1 MG/G
CREAM TOPICAL
Qty: 15 G | Refills: 0 | Status: SHIPPED | OUTPATIENT
Start: 2023-03-16

## 2023-03-16 RX ORDER — OLOPATADINE HYDROCHLORIDE 2 MG/ML
1 SOLUTION/ DROPS OPHTHALMIC DAILY
Qty: 7.5 ML | Refills: 4 | Status: SHIPPED | OUTPATIENT
Start: 2023-03-16

## 2023-03-16 RX ORDER — KETOCONAZOLE 20 MG/G
CREAM TOPICAL 2 TIMES DAILY
Qty: 15 G | Refills: 0 | Status: SHIPPED | OUTPATIENT
Start: 2023-03-16 | End: 2023-06-05

## 2023-03-16 ASSESSMENT — ENCOUNTER SYMPTOMS
MYALGIAS: 1
ARTHRALGIAS: 1

## 2023-03-16 NOTE — PATIENT INSTRUCTIONS
1.  Saline sinus rinse (one form comes in a squirt bottle form while another kind is known as a Neti Pots).  Follow directions on package.  May do this 1-2 times per day.  2.  Flonase (fluticasone):  use as directed on package or prescription.  A good idea is to use this medication with saline nasal irrigation.  If you choose to do this, use the fluticasone about 30-45 minutes after the sinus rinse to maximize effect of medication.    3.  Antihistamines:  Daytime:  Zyrtec (cetirizine).  10 mg once to twice daily.    Preventive Health Recommendations  Male Ages 40 to 49    Yearly exam:             See your health care provider every year in order to  o   Review health changes.   o   Discuss preventive care.    o   Review your medicines if your doctor has prescribed any.  You should be tested each year for STDs (sexually transmitted diseases) if you re at risk.   Have a cholesterol test every 5 years.   Have a colonoscopy (test for colon cancer) if someone in your family has had colon cancer or polyps before age 50.   After age 45, have a diabetes test (fasting glucose). If you are at risk for diabetes, you should have this test every 3 years.    Talk with your health care provider about whether or not a prostate cancer screening test (PSA) is right for you.    Shots: Get a flu shot each year. Get a tetanus shot every 10 years.     Nutrition:  Eat at least 5 servings of fruits and vegetables daily.   Eat whole-grain bread, whole-wheat pasta and brown rice instead of white grains and rice.   Get adequate Calcium and Vitamin D.     Lifestyle  Exercise for at least 150 minutes a week (30 minutes a day, 5 days a week). This will help you control your weight and prevent disease.   Limit alcohol to one drink per day.   No smoking.   Wear sunscreen to prevent skin cancer.   See your dentist every six months for an exam and cleaning.

## 2023-03-16 NOTE — PROGRESS NOTES
SUBJECTIVE:   CC: Fredy is an 47 year old who presents for preventative health visit.     Patient has been advised of split billing requirements and indicates understanding: Yes  Healthy Habits:     Getting at least 3 servings of Calcium per day:  NO    Bi-annual eye exam:  Yes    Dental care twice a year:  NO    Sleep apnea or symptoms of sleep apnea:  None    Diet:  Regular (no restrictions)    Frequency of exercise:  4-5 days/week    Duration of exercise:  45-60 minutes    Taking medications regularly:  Yes    Medication side effects:  Not applicable    PHQ-2 Total Score: 0    Additional concerns today:  Yes    Having C5/C6 disc replacement.  Working on getting physical therapy taken care of first.      Needs refills on medications.    Left posterior calf pain for 7-10 days, slight swelling, somewhat better today, has not worsened since onset.         Today's PHQ-2 Score:   PHQ-2 ( 1999 [employer]) 3/16/2023   Q1: Little interest or pleasure in doing things 0   Q2: Feeling down, depressed or hopeless 0   PHQ-2 Score 0   PHQ-2 Total Score (12-17 Years)- Positive if 3 or more points; Administer PHQ-A if positive -   Q1: Little interest or pleasure in doing things Not at all   Q2: Feeling down, depressed or hopeless Not at all   PHQ-2 Score 0       Have you ever done Advance Care Planning? (For example, a Health Directive, POLST, or a discussion with a medical provider or your loved ones about your wishes): No, advance care planning information given to patient to review.  Patient plans to discuss their wishes with loved ones or provider.      Social History     Tobacco Use     Smoking status: Never     Smokeless tobacco: Former     Types: Chew     Tobacco comments:     no smoking in the home.  1 tin/week in summer, 1 every other month in winter   Substance Use Topics     Alcohol use: Yes     Alcohol/week: 0.0 standard drinks     Comment: 4-5  drinks weekly         Alcohol Use 3/16/2023   Prescreen: >3 drinks/day or >7  "drinks/week? No       Last PSA: No results found for: PSA    Reviewed orders with patient. Reviewed health maintenance and updated orders accordingly - Yes      Reviewed and updated as needed this visit by clinical staff   Tobacco  Allergies  Meds              Reviewed and updated as needed this visit by Provider                     Review of Systems   Cardiovascular: Positive for peripheral edema.   Genitourinary: Negative for impotence and penile discharge.   Musculoskeletal: Positive for arthralgias and myalgias.         OBJECTIVE:   /78   Pulse 78   Temp 98.6  F (37  C) (Temporal)   Ht 1.803 m (5' 11\")   Wt 83.9 kg (185 lb)   SpO2 97%   BMI 25.80 kg/m      Physical Exam  Constitutional:       General: He is not in acute distress.     Appearance: He is well-developed.   HENT:      Head: Normocephalic and atraumatic.      Right Ear: Hearing, tympanic membrane, ear canal and external ear normal.      Left Ear: Hearing, tympanic membrane, ear canal and external ear normal.      Nose: Nose normal.      Mouth/Throat:      Mouth: No oral lesions.      Pharynx: Uvula midline. No oropharyngeal exudate.   Eyes:      General: Lids are normal. No scleral icterus.        Right eye: No discharge.         Left eye: No discharge.      Extraocular Movements: Extraocular movements intact.      Conjunctiva/sclera: Conjunctivae normal.      Pupils: Pupils are equal, round, and reactive to light.   Neck:      Thyroid: No thyroid mass or thyromegaly.      Trachea: No tracheal deviation.   Cardiovascular:      Rate and Rhythm: Normal rate and regular rhythm.      Pulses: Normal pulses.      Heart sounds: Normal heart sounds, S1 normal and S2 normal. No murmur heard.    No S3 or S4 sounds.   Pulmonary:      Effort: Pulmonary effort is normal. No respiratory distress.      Breath sounds: Normal breath sounds. No wheezing or rales.   Abdominal:      General: Bowel sounds are normal. There is no distension.      Palpations: " "Abdomen is soft. There is no mass.      Tenderness: There is no abdominal tenderness. There is no guarding.   Musculoskeletal:         General: No deformity. Normal range of motion.      Cervical back: Normal range of motion and neck supple.      Comments: Left calf non tender, mildly swollen, no bruising.    Lymphadenopathy:      Cervical: No cervical adenopathy.      Upper Body:      Right upper body: No supraclavicular adenopathy.      Left upper body: No supraclavicular adenopathy.   Skin:     General: Skin is warm and dry.      Findings: No lesion or rash.   Neurological:      Mental Status: He is alert and oriented to person, place, and time.      Motor: No abnormal muscle tone.      Deep Tendon Reflexes: Reflexes are normal and symmetric.   Psychiatric:         Speech: Speech normal.         Thought Content: Thought content normal.         Judgment: Judgment normal.               ASSESSMENT/PLAN:       ICD-10-CM    1. Routine general medical examination at a health care facility  Z00.00       2. Chronic seasonal allergic rhinitis due to pollen  J30.1 fluticasone (FLONASE) 50 MCG/ACT nasal spray      3. Chronic rhinitis  J31.0 azelastine (ASTELIN) 0.1 % nasal spray      4. Yeast infection of the skin  B37.2 ketoconazole (NIZORAL) 2 % external cream      5. Rectal itching  L29.0 triamcinolone (KENALOG) 0.1 % external cream      6. Allergic conjunctivitis, bilateral  H10.13 olopatadine (PATADAY) 0.2 % ophthalmic solution      7. NSAID long-term use  Z79.1 Creatinine      8. Elevated serum creatinine  R79.89                 COUNSELING:   Reviewed preventive health counseling, as reflected in patient instructions      BMI:   Estimated body mass index is 25.8 kg/m  as calculated from the following:    Height as of this encounter: 1.803 m (5' 11\").    Weight as of this encounter: 83.9 kg (185 lb).         He reports that he has never smoked. He has quit using smokeless tobacco.  His smokeless tobacco use included " chew.            Shree Cee MD  [facility]

## 2023-03-16 NOTE — LETTER
"March 23, 2023      Fredy Keys  [address] [address] [place] [id number]        Dear ,    We are writing to inform you of your test results.     \"Test results indicate kidney function stable from last check.  Will need to continue to monitor.\"     Shree Cee MD       Resulted Orders   Basic metabolic panel  (Ca, Cl, CO2, Creat, Gluc, K, Na, BUN)   Result Value Ref Range    Sodium 140 136 - 145 mmol/L    Potassium 4.6 3.4 - 5.3 mmol/L    Chloride 102 98 - 107 mmol/L    Carbon Dioxide (CO2) 28 22 - 29 mmol/L    Anion Gap 10 7 - 15 mmol/L    Urea Nitrogen 23.1 (H) 6.0 - 20.0 mg/dL    Creatinine 1.31 (H) 0.67 - 1.17 mg/dL    Calcium 9.3 8.6 - 10.0 mg/dL    Glucose 101 (H) 70 - 99 mg/dL    GFR Estimate 67 >60 mL/min/1.73m2      Comment:      eGFR calculated using 2021 CKD-EPI equation.       If you have any questions or concerns, please call the clinic at the number listed above.       Sincerely,      Shree Cee MD    "

## 2023-03-23 NOTE — RESULT ENCOUNTER NOTE
"Will have staff send letter to patient with message regarding recent results:  \"Test results indicate kidney function stable from last check.  Will need to continue to monitor.\"    Shree Cee MD "

## 2023-06-05 ENCOUNTER — OFFICE VISIT (OUTPATIENT)
Dept: FAMILY MEDICINE | Facility: CLINIC | Age: 48
End: 2023-06-05
Payer: COMMERCIAL

## 2023-06-05 VITALS
BODY MASS INDEX: 25.2 KG/M2 | HEART RATE: 80 BPM | TEMPERATURE: 98.7 F | WEIGHT: 180 LBS | RESPIRATION RATE: 18 BRPM | SYSTOLIC BLOOD PRESSURE: 112 MMHG | DIASTOLIC BLOOD PRESSURE: 82 MMHG | HEIGHT: 71 IN | OXYGEN SATURATION: 97 %

## 2023-06-05 DIAGNOSIS — Z01.818 PREOP GENERAL PHYSICAL EXAM: Primary | ICD-10-CM

## 2023-06-05 DIAGNOSIS — R79.89 ELEVATED SERUM CREATININE: ICD-10-CM

## 2023-06-05 LAB
CREAT UR-MCNC: 105.6 MG/DL
MICROALBUMIN UR-MCNC: <12 MG/L
MICROALBUMIN/CREAT UR: NORMAL MG/G{CREAT}

## 2023-06-05 PROCEDURE — 82043 UR ALBUMIN QUANTITATIVE: CPT | Performed by: FAMILY MEDICINE

## 2023-06-05 PROCEDURE — 82570 ASSAY OF URINE CREATININE: CPT | Performed by: FAMILY MEDICINE

## 2023-06-05 PROCEDURE — 99214 OFFICE O/P EST MOD 30 MIN: CPT | Performed by: FAMILY MEDICINE

## 2023-06-05 ASSESSMENT — PAIN SCALES - GENERAL: PAINLEVEL: MODERATE PAIN (4)

## 2023-06-05 NOTE — PATIENT INSTRUCTIONS
For informational purposes only. Not to replace the advice of your health care provider. Copyright   2003,  [employer] [facility] [employer] [facility]. All rights reserved. Clinically reviewed by Merced Villegas MD. [place] [id number] - REV .  Preparing for Your Surgery  Getting started  A nurse will call you to review your health history and instructions. They will give you an arrival time based on your scheduled surgery time. Please be ready to share:  Your doctor's clinic name and phone number  Your medical, surgical, and anesthesia history  A list of allergies and sensitivities  A list of medicines, including herbal treatments and over-the-counter drugs  Whether the patient has a legal guardian (ask how to send us the papers in advance)  Please tell us if you're pregnant--or if there's any chance you might be pregnant. Some surgeries may injure a fetus (unborn baby), so they require a pregnancy test. Surgeries that are safe for a fetus don't always need a test, and you can choose whether to have one.   If you have a child who's having surgery, please ask for a copy of Preparing for Your Child's Surgery.    Preparing for surgery  Within 10 to 30 days of surgery: Have a pre-op exam (sometimes called an H&P, or History and Physical). This can be done at a clinic or pre-operative center.  If you're having a , you may not need this exam. Talk to your care team.  At your pre-op exam, talk to your care team about all medicines you take. If you need to stop any medicines before surgery, ask when to start taking them again.  We do this for your safety. Many medicines can make you bleed too much during surgery. Some change how well surgery (anesthesia) drugs work.  Call your insurance company to let them know you're having surgery. (If you don't have insurance, call [phone].)  Call your clinic if there's any change in your health. This includes signs of a cold or flu (sore throat, runny nose, cough, rash, fever). It  also includes a scrape or scratch near the surgery site.  If you have questions on the day of surgery, call your hospital or surgery center.  Eating and drinking guidelines  For your safety: Unless your surgeon tells you otherwise, follow the guidelines below.  Eat and drink as usual until 8 hours before you arrive for surgery. After that, no food or milk.  Drink clear liquids until 2 hours before you arrive. These are liquids you can see through, like water, Gatorade, and Propel Water. They also include plain black coffee and tea (no cream or milk), candy, and breath mints. You can spit out gum when you arrive.  If you drink alcohol: Stop drinking it the night before surgery.  If your care team tells you to take medicine on the morning of surgery, it's okay to take it with a sip of water.  Preventing infection  Shower or bathe the night before and morning of your surgery. Follow the instructions your clinic gave you. (If no instructions, use regular soap.)  Don't shave or clip hair near your surgery site. We'll remove the hair if needed.  Don't smoke or vape the morning of surgery. You may chew nicotine gum up to 2 hours before surgery. A nicotine patch is okay.  Note: Some surgeries require you to completely quit smoking and nicotine. Check with your surgeon.  Your care team will make every effort to keep you safe from infection. We will:  Clean our hands often with soap and water (or an alcohol-based hand rub).  Clean the skin at your surgery site with a special soap that kills germs.  Give you a special gown to keep you warm. (Cold raises the risk of infection.)  Wear special hair covers, masks, gowns and gloves during surgery.  Give antibiotic medicine, if prescribed. Not all surgeries need antibiotics.  What to bring on the day of surgery  Photo ID and insurance card  Copy of your health care directive, if you have one  Glasses and hearing aids (bring cases)  You can't wear contacts during surgery  Inhaler and  eye drops, if you use them (tell us about these when you arrive)  CPAP machine or breathing device, if you use them  A few personal items, if spending the night  If you have . . .  A pacemaker, ICD (cardiac defibrillator) or other implant: Bring the ID card.  An implanted stimulator: Bring the remote control.  A legal guardian: Bring a copy of the certified (court-stamped) guardianship papers.  Please remove any jewelry, including body piercings. Leave jewelry and other valuables at home.  If you're going home the day of surgery  You must have a responsible adult drive you home. They should stay with you overnight as well.  If you don't have someone to stay with you, and you aren't safe to go home alone, we may keep you overnight. Insurance often won't pay for this.  After surgery  If it's hard to control your pain or you need more pain medicine, please call your surgeon's office.  Questions?   If you have any questions for your care team, list them here: _________________________________________________________________________________________________________________________________________________________________________ ____________________________________ ____________________________________ ____________________________________    How to Take Your Medication Before Surgery  - Take all of your medications before surgery as usual

## 2023-06-05 NOTE — PROGRESS NOTES
[address] [address]  Phone: [phone]  Fax: [phone]  Primary Provider: Nan Velez  Pre-op Performing Provider: NAN VELEZ      PREOPERATIVE EVALUATION:  Today's date: 6/5/2023    Fredy Keys is a 47 year old male who presents for a preoperative evaluation.      6/5/2023     3:17 PM   Additional Questions   Roomed by Suzi LEONARD   Accompanied by Self         6/5/2023     3:17 PM   Patient Reported Additional Medications   Patient reports taking the following new medications none     Surgical Information:  Surgery/Procedure: Cervical Disc Replacement   Surgery Location: [facility]  Surgeon: Dr. Khoa Mccarty  Surgery Date: 06/13/23  Time of Surgery: 11:30 am  Where patient plans to recover: At home with family  Fax number for surgical facility: [phone]    Assessment & Plan     The proposed surgical procedure is considered INTERMEDIATE risk.    ASSESSMENT/ORDERS:    ICD-10-CM    1. Preop general physical exam  Z01.818       2. Elevated serum creatinine  R79.89 Albumin Random Urine Quantitative with Creat Ratio        PLAN:  1.  Microalbumin ordered today due to continued elevated creatinine to rule out chronic kidney disease.             - No identified additional risk factors other than previously addressed    Antiplatelet or Anticoagulation Medication Instructions:   - Patient is on no antiplatelet or anticoagulation medications.    Additional Medication Instructions:  Patient is to take all scheduled medications on the day of surgery    RECOMMENDATION:  APPROVAL GIVEN to proceed with proposed procedure, without further diagnostic evaluation.            Subjective       HPI related to upcoming procedure: recommended for cervical disc replacement.        6/5/2023     3:17 PM   Preop Questions   1. Have you ever had a heart attack or stroke? No   2. Have you ever had surgery on your heart or blood  vessels, such as a stent placement, a coronary artery bypass, or surgery on an artery in your head, neck, heart, or legs? No   3. Do you have chest pain with activity? No   4. Do you have a history of  heart failure? No   5. Do you currently have a cold, bronchitis or symptoms of other infection? No   6. Do you have a cough, shortness of breath, or wheezing? No   7. Do you or anyone in your family have previous history of blood clots? YES - dad has factor V Leiden; patient does not believe he has the gene and no previous personal blood clot history.   8. Do you or does anyone in your family have a serious bleeding problem such as prolonged bleeding following surgeries or cuts? No   9. Have you ever had problems with anemia or been told to take iron pills? No   10. Have you had any abnormal blood loss such as black, tarry or bloody stools? No   11. Have you ever had a blood transfusion? No   12. Are you willing to have a blood transfusion if it is medically needed before, during, or after your surgery? Yes   13. Have you or any of your relatives ever had problems with anesthesia? No   14. Do you have sleep apnea, excessive snoring or daytime drowsiness? No   15. Do you have any artifical heart valves or other implanted medical devices like a pacemaker, defibrillator, or continuous glucose monitor? No   16. Do you have artificial joints? No   17. Are you allergic to latex? No       Health Care Directive:  Patient does not have a Health Care Directive or Living Will: Discussed advance care planning with patient; information given to patient to review.    Preoperative Review of :   reviewed - no record of controlled substances prescribed.       Status of Chronic Conditions:  See problem list for active medical problems.  Problems all longstanding and stable, except as noted/documented.  See ROS for pertinent symptoms related to these conditions.      Review of Systems  Constitutional, neuro, ENT, endocrine,  pulmonary, cardiac, gastrointestinal, genitourinary, musculoskeletal, integument and psychiatric systems are negative, except as otherwise noted.    Patient Active Problem List    Diagnosis Date Noted     Elevated serum creatinine 03/16/2023     Priority: Medium     Lipoma of back 06/18/2020     Priority: Medium     Added automatically from request for surgery [id number]       Lipoma of both lower extremities 06/18/2020     Priority: Medium     Added automatically from request for surgery [id number]       NSAID long-term use 08/22/2019     Priority: Medium     Allergic conjunctivitis, bilateral 06/17/2011     Priority: Medium     Chronic seasonal allergic rhinitis due to pollen 08/24/2006     Priority: Medium      Past Medical History:   Diagnosis Date     ALLERGIC RHINITIS NOS 8/24/2006     Left inguinal hernia 2/3/2020    Added automatically from request for surgery [id number]     Past Surgical History:   Procedure Laterality Date     COLONOSCOPY N/A 10/2/2020    Procedure: Colonoscopy with possible biopsy and/or polypectomy;  Surgeon: Merrill Briggs MD;  Location:  GI     ENT SURGERY       EXCISE MASS BACK N/A 6/29/2020    Procedure: Excision back masses;  Surgeon: Trell Escobar MD;  Location: [facility] OR     EXCISE MASS LOWER EXTREMITY Left 6/29/2020    Procedure: Excision left leg mass;  Surgeon: Trell Escobar MD;  Location:  OR     LAPAROSCOPIC HERNIORRHAPHY INGUINAL Left 3/3/2020    Procedure: Laparoscopic left inguinal hernia repair;  Surgeon: Trell Escobar MD;  Location:  OR     Current Outpatient Medications   Medication Sig Dispense Refill     azelastine (ASTELIN) 0.1 % nasal spray Spray 2 sprays into both nostrils 2 times daily 90 mL 4     fluticasone (FLONASE) 50 MCG/ACT nasal spray Spray 1-2 sprays into both nostrils daily 48 g 4     olopatadine (PATADAY) 0.2 % ophthalmic solution Place 0.05 mLs (1 drop) into both eyes daily 7.5 mL 4     triamcinolone (KENALOG) 0.1 % external cream Apply  "sparingly to affected area three times daily 15 g 0     HEMP OIL OR EXTRACT OR OTHER CBD CANNABINOID, NOT MEDICAL CANNABIS, 0.25 mLs (Patient not taking: Reported on 6/5/2023)       ketoconazole (NIZORAL) 2 % external cream Apply topically 2 times daily (Patient not taking: Reported on 6/5/2023) 15 g 0     salicylic acid (COMPOUND W MAX STRENGTH) 17 % external gel Apply topically daily (Patient not taking: Reported on 6/5/2023) 14 g 1       Allergies   Allergen Reactions     Zithromax [Azithromycin Dihydrate] Hives     Seasonal Allergies         Social History     Tobacco Use     Smoking status: Never     Smokeless tobacco: Former     Types: Chew     Tobacco comments:     no smoking in the home.  1 tin/week in summer, 1 every other month in winter   Vaping Use     Vaping status: Never Used   Substance Use Topics     Alcohol use: Yes     Alcohol/week: 0.0 standard drinks of alcohol     Comment: 4-5  drinks weekly       History   Drug Use No         Objective     /82 (BP Location: Right arm, Patient Position: Sitting, Cuff Size: Adult Large)   Pulse 80   Temp 98.7  F (37.1  C) (Temporal)   Resp 18   Ht 1.803 m (5' 11\")   Wt 81.6 kg (180 lb)   SpO2 97%   BMI 25.10 kg/m      Physical Exam    GENERAL APPEARANCE: healthy, alert and no distress     EYES: EOMI,  PERRL     HENT: ear canals and TM's normal and nose and mouth without ulcers or lesions     NECK: no adenopathy, no asymmetry, masses, or scars and thyroid normal to palpation     RESP: lungs clear to auscultation - no rales, rhonchi or wheezes     CV: regular rates and rhythm, normal S1 S2, no S3 or S4 and no murmur, click or rub     ABDOMEN:  soft, nontender, no HSM or masses and bowel sounds normal     MS: extremities normal- no gross deformities noted, no evidence of inflammation in joints, FROM in all extremities.     SKIN: no suspicious lesions or rashes     NEURO: Normal strength and tone, sensory exam grossly normal, mentation intact and speech " normal     PSYCH: mentation appears normal. and affect normal/bright     LYMPHATICS: No cervical adenopathy    Recent Labs   Lab Test 03/16/23  1610      POTASSIUM 4.6   CR 1.31*        Diagnostics:  No labs were ordered during this visit that pertain to preoperative visit.  Lab ordered to continue to workup his elevated creatinine.  No EKG required, no history of coronary heart disease, significant arrhythmia, peripheral arterial disease or other structural heart disease.    Revised Cardiac Risk Index (RCRI):  The patient has the following serious cardiovascular risks for perioperative complications:   - No serious cardiac risks = 0 points     RCRI Interpretation: 0 points: Class I (very low risk - 0.4% complication rate)           Signed Electronically by: Shree Cee MD  Copy of this evaluation report is provided to requesting physician.

## 2023-06-14 ENCOUNTER — TELEPHONE (OUTPATIENT)
Dept: FAMILY MEDICINE | Facility: CLINIC | Age: 48
End: 2023-06-14
Payer: COMMERCIAL

## 2023-06-14 NOTE — TELEPHONE ENCOUNTER
Patient had 2 cervical disks replaced.   He has a drain in his neck that he is wondering if you will remove tomorrow so [place]?    Milagros Luo RN on 6/14/2023 at 1:30 PM     Processing Note: The specimen was frozen in the cryostat, sectioned and stained.

## 2023-10-27 NOTE — ADDENDUM NOTE
Addended by: RICKEY REED on: 8/29/2019 04:26 PM     Modules accepted: Orders     IV discontinued, cath removed intact

## 2024-02-15 ENCOUNTER — PATIENT OUTREACH (OUTPATIENT)
Dept: CARE COORDINATION | Facility: CLINIC | Age: 49
End: 2024-02-15
Payer: COMMERCIAL

## 2024-02-29 ENCOUNTER — PATIENT OUTREACH (OUTPATIENT)
Dept: CARE COORDINATION | Facility: CLINIC | Age: 49
End: 2024-02-29
Payer: COMMERCIAL

## 2024-07-23 ENCOUNTER — NURSE TRIAGE (OUTPATIENT)
Dept: FAMILY MEDICINE | Facility: CLINIC | Age: 49
End: 2024-07-23
Payer: COMMERCIAL

## 2024-07-23 ENCOUNTER — MYC MEDICAL ADVICE (OUTPATIENT)
Dept: FAMILY MEDICINE | Facility: CLINIC | Age: 49
End: 2024-07-23
Payer: COMMERCIAL

## 2024-07-23 NOTE — TELEPHONE ENCOUNTER
"Nurse Triage SBAR    Is this a 2nd Level Triage? YES, LICENSED PRACTITIONER REVIEW IS REQUIRED    Situation: Left calf pain    Background: Patient states that he has ongoing left calf pain for about a year- with increasing pain. Patient states he went to  in [place] last night 07/22/2024 and was dx with a \"bad valve\". Patient states that the pain increases with exercise - cardio and jumping rope.     Patient is leaving on vacation for 2 weeks starting Friday afternoon 07/26/2024 and will be driving. Advised patient to make frequent stop to get out and walk around.     Assessment:  Patient denies breathing difficulty, swelling, fever, chest pain, warmth in the area of the calf.   Patient states he left leg feels like it is \"asleep\" and has a \"nagging\" feeling.   Family hx of Factor 5 Leiden.     Protocol Recommended Disposition:   See in Office Today or Tomorrow Patient is requesting an MRI of the left calf.     Recommendation: RN reviewed red flag symptoms with patient per RN protocol. Advised patient to seek emergency care if symptoms worsen or if pain becomes unmanageable. Patient verbalized understanding and no further questions at this time.    Routed to provider for review    Nishi Ruano RN on 7/23/2024 at 3:59 PM    Reason for Disposition   Numbness in a leg or foot (i.e., loss of sensation)    Additional Information   Negative: Looks like a broken bone or dislocated joint (e.g., crooked or deformed)   Negative: Sounds like a life-threatening emergency to the triager   Negative: Followed a hip injury   Negative: Followed a knee injury   Negative: Followed an ankle or foot injury   Negative: Back pain radiating (shooting) into leg(s)   Negative: Foot pain is main symptom   Negative: Ankle pain is main symptom   Negative: Knee pain is main symptom   Negative: Leg swelling is main symptom   Negative: Chest pain   Negative: Difficulty breathing   Negative: Entire foot is cool or blue in comparison to " "other side   Negative: Unable to walk   Negative: Fever and red area (or area very tender to touch)   Negative: Swollen joint and fever   Negative: Thigh or calf pain in only one leg and present > 1 hour   Negative: Thigh, calf, or ankle swelling in only one leg   Negative: Thigh, calf, or ankle swelling in both legs, but one side is definitely more swollen   Negative: History of prior 'blood clot' in leg or lungs (i.e., deep vein thrombosis, pulmonary embolism)   Negative: History of inherited increased risk of blood clots (e.g., factor 5 Leiden, antithrombin 3, protein C or protein S deficiency, prothrombin mutation)   Negative: Major surgery in past month   Negative: Hip or leg fracture (broken bone) in past month (or had cast on leg or ankle in past month)   Negative: Illness requiring prolonged bedrest in past month (e.g., immobilization, long hospital stay)   Negative: Long-distance travel in past month (e.g., car, bus, train, plane; with trip lasting 6 or more hours)   Negative: Cancer treatment in past six months (or has cancer now)   Negative: Patient sounds very sick or weak to the triager   Negative: SEVERE pain (e.g., excruciating, unable to do any normal activities)   Negative: Cast on leg or ankle and now has increasing pain   Negative: Red area or streak > 2 inches (or 5 cm)   Negative: Painful rash with multiple small blisters grouped together (i.e., dermatomal distribution or 'band' or 'stripe')   Negative: Looks like a boil, infected sore, deep ulcer, or other infected rash (spreading redness, pus)   Negative: Localized rash is very painful (no fever)    Answer Assessment - Initial Assessment Questions  1. ONSET: \"When did the pain start?\"      About a year ago  2. LOCATION: \"Where is the pain located?\"       Left Calf  3. PAIN: \"How bad is the pain?\"    (Scale 1-10; or mild, moderate, severe)    -  MODERATE (4-7): interferes with normal activities (e.g., work or school) or awakens from sleep, " "limping   4. WORK OR EXERCISE: \"Has there been any recent work or exercise that involved this part of the body?\"       Exercise increases pain  5. CAUSE: \"What do you think is causing the leg pain?\"      Unknown  6. OTHER SYMPTOMS: \"Do you have any other symptoms?\" (e.g., chest pain, back pain, breathing difficulty, swelling, rash, fever, numbness, weakness)      Denies breathing difficulty, swelling, fever, chest pain warmth in the area  7. PREGNANCY: \"Is there any chance you are pregnant?\" \"When was your last menstrual period?\"      NA    Protocols used: Leg Pain-A-[facility]    "

## 2024-07-25 ENCOUNTER — HOSPITAL ENCOUNTER (OUTPATIENT)
Dept: ULTRASOUND IMAGING | Facility: CLINIC | Age: 49
Discharge: HOME | End: 2024-07-25
Attending: FAMILY MEDICINE
Payer: COMMERCIAL

## 2024-07-25 ENCOUNTER — OFFICE VISIT (OUTPATIENT)
Dept: FAMILY MEDICINE | Facility: CLINIC | Age: 49
End: 2024-07-25
Payer: COMMERCIAL

## 2024-07-25 VITALS
BODY MASS INDEX: 24.92 KG/M2 | HEART RATE: 90 BPM | DIASTOLIC BLOOD PRESSURE: 78 MMHG | SYSTOLIC BLOOD PRESSURE: 121 MMHG | OXYGEN SATURATION: 98 % | HEIGHT: 71 IN | TEMPERATURE: 98.7 F | WEIGHT: 178 LBS | RESPIRATION RATE: 20 BRPM

## 2024-07-25 DIAGNOSIS — I73.9 CLAUDICATION (H): ICD-10-CM

## 2024-07-25 DIAGNOSIS — I73.9 CLAUDICATION (H): Primary | ICD-10-CM

## 2024-07-25 DIAGNOSIS — M79.662 PAIN OF LEFT CALF: ICD-10-CM

## 2024-07-25 PROCEDURE — 93924 LWR XTR VASC STDY BILAT: CPT

## 2024-07-25 PROCEDURE — 93970 EXTREMITY STUDY: CPT

## 2024-07-25 PROCEDURE — 99213 OFFICE O/P EST LOW 20 MIN: CPT | Performed by: FAMILY MEDICINE

## 2024-07-25 ASSESSMENT — ENCOUNTER SYMPTOMS: LEG PAIN: 1

## 2024-07-25 ASSESSMENT — PAIN SCALES - GENERAL: PAINLEVEL: MILD PAIN (2)

## 2024-07-25 NOTE — TELEPHONE ENCOUNTER
Patient can and should see one of the same day providers to address this problem and discuss appropriate imaging as applicable.    Will have staff notify patient and schedule.    Shree Cee MD

## 2024-07-25 NOTE — PROGRESS NOTES
Assessment & Plan     Claudication (H24)  Fredy is a 49-year-old male comes to clinic today with complaint of left calf pain.  He has had this pain on again off again for about a year.  He notices it primarily with activity when he is working out he has a sharp aching pain in the posterior left calf.  There is a family history for factor V Leiden deficiency.  He has never had previous clot.  He does notice some superficial varicose veins in that area which are more noticeable over the last several months.  He denies any foot swelling but he is slightly ambiguous in this regard.  He denies any pallor or coldness to the foot but again is slightly ambiguous about this.    On exam he is a well-developed middle-age male in no acute distress his blood pressure is 121/78 and his pulse is 90 and regular.  Extremities show slightly diminished bilateral dorsalis pedis pulses below what I would expect.  He has warm toes with no pallor and good capillary refill.  He has no tenderness with palpation along the Achilles tendon bilaterally.  On the left lower extremity he has point tenderness in the distal gastrocnemius and soleus.  Overriding this are some dilated superficial varicose veins.  Homans' sign is negative.    Assessment: 49-year-old male with several months history of persistent left calf pain worse with activity.  Family history for factor V Leiden deficiency.  He is going to be leaving in 2 days for a vacation where he will be traveling.  Will obtain bilateral lower extremity venous ultrasound and ankle-brachial index.  Pending the results of that recommend compression sleeve with activity.  - US Lower Extremity Venous Duplex Bilateral; Future  - US MARGUERITE Doppler No Exercise; Future            FURTHER TESTING:       - BLE ultrasound   Follow-up Visit   Expected date:  Oct 25, 2024 (Approximate)      Follow Up Appointment Details:     Follow-up with whom?: PCP    Follow-Up for what?: Adult Preventive    How?: In Person                        Rama Dainel is a 49 year old, presenting for the following health issues:  Leg Pain (Left calf)      7/25/2024    11:05 AM   Additional Questions   Roomed by Silvia         7/25/2024    11:05 AM   Patient Reported Additional Medications   Patient reports taking the following new medications none     Leg Pain    History of Present Illness       Reason for visit:  Calf pain  Symptom onset:  More than a month  Symptoms include:  Pain, slightly throbbing, veins popping out, redness occasionally  Symptom intensity:  Moderate  Symptom progression:  Worsening  Had these symptoms before:  No  What makes it worse:  Exercise  What makes it better:  Resting    He eats 0-1 servings of fruits and vegetables daily.He consumes 1 sweetened beverage(s) daily.He exercises with enough effort to increase his heart rate 60 or more minutes per day.  He exercises with enough effort to increase his heart rate 5 days per week.   He is taking medications regularly.           Patient Active Problem List   Diagnosis    Chronic seasonal allergic rhinitis due to pollen    Allergic conjunctivitis, bilateral    NSAID long-term use    Lipoma of back    Lipoma of both lower extremities    Elevated serum creatinine     Current Outpatient Medications   Medication Sig Dispense Refill    azelastine (ASTELIN) 0.1 % nasal spray Spray 2 sprays into both nostrils 2 times daily 90 mL 4    fluticasone (FLONASE) 50 MCG/ACT nasal spray Spray 1-2 sprays into both nostrils daily 48 g 4    olopatadine (PATADAY) 0.2 % ophthalmic solution Place 0.05 mLs (1 drop) into both eyes daily 7.5 mL 4    triamcinolone (KENALOG) 0.1 % external cream Apply sparingly to affected area three times daily 15 g 0           Review of Systems  CONSTITUTIONAL: NEGATIVE for fever, chills, change in weight  ENT/MOUTH: NEGATIVE for ear, mouth and throat problems  RESP: NEGATIVE for significant cough or SOB  CV: NEGATIVE for chest pain, palpitations or peripheral  "edema  MUSCULOSKELETAL: POSITIVE  for  He has had this pain on again off again for about a year.  He notices it primarily with activity when he is working out he has a sharp aching pain in the posterior left calf.  There is a family history for factor V Leiden deficiency.  He has never had previous clot.  He does notice some superficial varicose veins in that area which are more noticeable over the last several months.  He denies any foot swelling but he is slightly ambiguous in this regard.  He denies any pallor or coldness to the foot but again is slightly ambiguous about this.  ROS otherwise negative      Objective    /78 (BP Location: Right arm, Patient Position: Sitting, Cuff Size: Adult Regular)   Pulse 90   Temp 98.7  F (37.1  C) (Oral)   Resp 20   Ht 1.803 m (5' 11\")   Wt 80.7 kg (178 lb)   SpO2 98%   BMI 24.83 kg/m    Body mass index is 24.83 kg/m .  Physical Exam   GENERAL: alert and no distress  MS: On exam he is a well-developed middle-age male in no acute distress his blood pressure is 121/78 and his pulse is 90 and regular.  Extremities show slightly diminished bilateral dorsalis pedis pulses below what I would expect.  He has warm toes with no pallor and good capillary refill.  He has no tenderness with palpation along the Achilles tendon bilaterally.  On the left lower extremity he has point tenderness in the distal gastrocnemius and soleus.  Overriding this are some dilated superficial varicose veins.  Homans' sign is negative.            Signed Electronically by: Harrison Moncada MD    "

## 2024-09-01 ENCOUNTER — HEALTH MAINTENANCE LETTER (OUTPATIENT)
Age: 49
End: 2024-09-01

## 2025-05-04 NOTE — PATIENT INSTRUCTIONS
Before Your Surgery      Call your surgeon if there is any change in your health. This includes signs of a cold or flu (such as a sore throat, runny nose, cough, rash or fever).    Do not smoke, drink alcohol or take over the counter medicine (unless your surgeon or primary care doctor tells you to) for the 24 hours before and after surgery.    If you take prescribed drugs: Follow your doctor s orders about which medicines to take and which to stop until after surgery.    Eating and drinking prior to surgery: follow the instructions from your surgeon    Take a shower or bath the night before surgery. Use the soap your surgeon gave you to gently clean your skin. If you do not have soap from your surgeon, use your regular soap. Do not shave or scrub the surgery site.  Wear clean pajamas and have clean sheets on your bed.   
normal...

## 2025-09-02 ENCOUNTER — PATIENT OUTREACH (OUTPATIENT)
Dept: CARE COORDINATION | Facility: CLINIC | Age: 50
End: 2025-09-02
Payer: COMMERCIAL

## (undated) DEVICE — PREP CHLORAPREP 26ML TINTED ORANGE  260815

## (undated) DEVICE — STOCKING SLEEVE COMPRESSION CALF MED

## (undated) DEVICE — DRSG PRIMAPORE 03 1/8X6" 66000318

## (undated) DEVICE — DEVICE FIXATION ABSORBLE TACK 5MM SINGLE ABSTACK30X

## (undated) DEVICE — BLADE KNIFE SURG 15 371115

## (undated) DEVICE — SOL ADH LIQUID BENZOIN SWAB 0.6ML C1544

## (undated) DEVICE — DRSG STERI STRIP 1/2X4" R1547

## (undated) DEVICE — GLOVE PROTEXIS W/NEU-THERA 7.5  2D73TE75

## (undated) DEVICE — SU MONOCRYL 4-0 PS-2 18" UND Y496G

## (undated) DEVICE — DRAPE LAP TRANSVERSE 29421

## (undated) DEVICE — PACK GENERAL LAPAOSCOPY

## (undated) DEVICE — DISSECTOR BALLOON SPACEMAKER PRO BTT & OVAL SMBTTOVLX

## (undated) DEVICE — ESU ELEC BLADE HEX-LOCKING 2.5" E1450X

## (undated) DEVICE — SU VICRYL 3-0 SH 27" UND J416H

## (undated) DEVICE — ENDO DISSECTOR BLUNT 05MM 3/PK 173019

## (undated) DEVICE — SOL NACL 0.9% IRRIG 1000ML BOTTLE 07138-09

## (undated) DEVICE — SYR BULB IRRIG DOVER 60 ML LATEX FREE 67000

## (undated) DEVICE — ADH SKIN CLOSURE PREMIERPRO EXOFIN 1.0ML 3470

## (undated) DEVICE — ESU GROUND PAD UNIVERSAL W/O CORD

## (undated) DEVICE — PACK MINOR PROCEDURE CUSTOM

## (undated) DEVICE — LUBRICATING JELLY 4.25OZ

## (undated) DEVICE — DRAPE LAP W/ARMBOARD 29410

## (undated) RX ORDER — ONDANSETRON 2 MG/ML
INJECTION INTRAMUSCULAR; INTRAVENOUS
Status: DISPENSED
Start: 2020-03-03

## (undated) RX ORDER — BUPIVACAINE HYDROCHLORIDE 2.5 MG/ML
INJECTION, SOLUTION EPIDURAL; INFILTRATION; INTRACAUDAL
Status: DISPENSED
Start: 2020-03-03

## (undated) RX ORDER — HYDROMORPHONE HYDROCHLORIDE 1 MG/ML
INJECTION, SOLUTION INTRAMUSCULAR; INTRAVENOUS; SUBCUTANEOUS
Status: DISPENSED
Start: 2020-03-03

## (undated) RX ORDER — PROPOFOL 10 MG/ML
INJECTION, EMULSION INTRAVENOUS
Status: DISPENSED
Start: 2020-03-03

## (undated) RX ORDER — DIPHENHYDRAMINE HYDROCHLORIDE 50 MG/ML
INJECTION INTRAMUSCULAR; INTRAVENOUS
Status: DISPENSED
Start: 2020-03-03

## (undated) RX ORDER — LABETALOL HYDROCHLORIDE 5 MG/ML
INJECTION, SOLUTION INTRAVENOUS
Status: DISPENSED
Start: 2020-03-03

## (undated) RX ORDER — EPINEPHRINE 1 MG/ML
INJECTION, SOLUTION INTRAMUSCULAR; SUBCUTANEOUS
Status: DISPENSED
Start: 2020-03-03

## (undated) RX ORDER — BUPIVACAINE HYDROCHLORIDE AND EPINEPHRINE 2.5; 5 MG/ML; UG/ML
INJECTION, SOLUTION EPIDURAL; INFILTRATION; INTRACAUDAL; PERINEURAL
Status: DISPENSED
Start: 2020-06-29

## (undated) RX ORDER — LIDOCAINE HYDROCHLORIDE AND EPINEPHRINE 10; 10 MG/ML; UG/ML
INJECTION, SOLUTION INFILTRATION; PERINEURAL
Status: DISPENSED
Start: 2020-06-29

## (undated) RX ORDER — DEXAMETHASONE SODIUM PHOSPHATE 10 MG/ML
INJECTION, SOLUTION INTRAMUSCULAR; INTRAVENOUS
Status: DISPENSED
Start: 2020-03-03

## (undated) RX ORDER — FENTANYL CITRATE 50 UG/ML
INJECTION, SOLUTION INTRAMUSCULAR; INTRAVENOUS
Status: DISPENSED
Start: 2020-06-29

## (undated) RX ORDER — FENTANYL CITRATE 50 UG/ML
INJECTION, SOLUTION INTRAMUSCULAR; INTRAVENOUS
Status: DISPENSED
Start: 2020-03-03

## (undated) RX ORDER — FENTANYL CITRATE 50 UG/ML
INJECTION, SOLUTION INTRAMUSCULAR; INTRAVENOUS
Status: DISPENSED
Start: 2020-10-02

## (undated) RX ORDER — LIDOCAINE HYDROCHLORIDE 20 MG/ML
INJECTION, SOLUTION EPIDURAL; INFILTRATION; INTRACAUDAL; PERINEURAL
Status: DISPENSED
Start: 2020-03-03

## (undated) RX ORDER — ESMOLOL HYDROCHLORIDE 10 MG/ML
INJECTION INTRAVENOUS
Status: DISPENSED
Start: 2020-03-03

## (undated) RX ORDER — LIDOCAINE HYDROCHLORIDE 10 MG/ML
INJECTION, SOLUTION EPIDURAL; INFILTRATION; INTRACAUDAL; PERINEURAL
Status: DISPENSED
Start: 2020-03-03